# Patient Record
Sex: MALE | Race: WHITE | NOT HISPANIC OR LATINO | Employment: OTHER | URBAN - METROPOLITAN AREA
[De-identification: names, ages, dates, MRNs, and addresses within clinical notes are randomized per-mention and may not be internally consistent; named-entity substitution may affect disease eponyms.]

---

## 2018-10-27 ENCOUNTER — HOSPITAL ENCOUNTER (INPATIENT)
Facility: HOSPITAL | Age: 83
LOS: 4 days | Discharge: NURSING, REHAB OR LONG-TERM CARE | DRG: 470 | End: 2018-10-31
Attending: EMERGENCY MEDICINE | Admitting: INTERNAL MEDICINE
Payer: MEDICARE

## 2018-10-27 DIAGNOSIS — Z01.811 PRE-OP CHEST EXAM: ICD-10-CM

## 2018-10-27 DIAGNOSIS — S79.919A HIP INJURY: ICD-10-CM

## 2018-10-27 DIAGNOSIS — I49.9 IRREGULAR HEART BEAT: ICD-10-CM

## 2018-10-27 DIAGNOSIS — S72.002A LEFT DISPLACED FEMORAL NECK FRACTURE: Primary | ICD-10-CM

## 2018-10-27 PROCEDURE — 96375 TX/PRO/DX INJ NEW DRUG ADDON: CPT

## 2018-10-27 PROCEDURE — 96374 THER/PROPH/DIAG INJ IV PUSH: CPT

## 2018-10-27 PROCEDURE — 12000002 HC ACUTE/MED SURGE SEMI-PRIVATE ROOM

## 2018-10-27 PROCEDURE — 99285 EMERGENCY DEPT VISIT HI MDM: CPT | Mod: 25

## 2018-10-28 PROBLEM — S72.002A LEFT DISPLACED FEMORAL NECK FRACTURE: Status: ACTIVE | Noted: 2018-10-28

## 2018-10-28 PROBLEM — F03.90 DEMENTIA WITHOUT BEHAVIORAL DISTURBANCE: Status: ACTIVE | Noted: 2018-10-28

## 2018-10-28 PROBLEM — I10 ESSENTIAL HYPERTENSION: Status: ACTIVE | Noted: 2018-10-28

## 2018-10-28 PROBLEM — F02.818 ALZHEIMER'S DEMENTIA WITH BEHAVIORAL DISTURBANCE: Status: ACTIVE | Noted: 2018-10-28

## 2018-10-28 PROBLEM — F03.918 DEMENTIA WITH BEHAVIORAL DISTURBANCE: Status: ACTIVE | Noted: 2018-10-28

## 2018-10-28 PROBLEM — I50.22 CHRONIC SYSTOLIC HEART FAILURE: Status: ACTIVE | Noted: 2018-10-28

## 2018-10-28 PROBLEM — G30.9 ALZHEIMER'S DEMENTIA WITH BEHAVIORAL DISTURBANCE: Status: ACTIVE | Noted: 2018-10-28

## 2018-10-28 PROBLEM — I50.42 CHRONIC COMBINED SYSTOLIC AND DIASTOLIC HEART FAILURE: Status: ACTIVE | Noted: 2018-10-28

## 2018-10-28 LAB
ALBUMIN SERPL BCP-MCNC: 3.8 G/DL
ALP SERPL-CCNC: 81 U/L
ALT SERPL W/O P-5'-P-CCNC: 16 U/L
ANION GAP SERPL CALC-SCNC: 10 MMOL/L
ANION GAP SERPL CALC-SCNC: 12 MMOL/L
APTT BLDCRRT: 26.2 SEC
AST SERPL-CCNC: 25 U/L
BASOPHILS # BLD AUTO: 0 K/UL
BASOPHILS # BLD AUTO: ABNORMAL K/UL
BASOPHILS NFR BLD: 0 %
BASOPHILS NFR BLD: 1 %
BILIRUB SERPL-MCNC: 0.6 MG/DL
BILIRUB UR QL STRIP: NEGATIVE
BUN SERPL-MCNC: 18 MG/DL
BUN SERPL-MCNC: 20 MG/DL
CALCIUM SERPL-MCNC: 8.7 MG/DL
CALCIUM SERPL-MCNC: 9.4 MG/DL
CHLORIDE SERPL-SCNC: 104 MMOL/L
CHLORIDE SERPL-SCNC: 106 MMOL/L
CLARITY UR: CLEAR
CO2 SERPL-SCNC: 21 MMOL/L
CO2 SERPL-SCNC: 23 MMOL/L
COLOR UR: YELLOW
CREAT SERPL-MCNC: 0.8 MG/DL
CREAT SERPL-MCNC: 0.8 MG/DL
DIFFERENTIAL METHOD: ABNORMAL
DIFFERENTIAL METHOD: ABNORMAL
EOSINOPHIL # BLD AUTO: 0 K/UL
EOSINOPHIL # BLD AUTO: ABNORMAL K/UL
EOSINOPHIL NFR BLD: 0.3 %
EOSINOPHIL NFR BLD: 2 %
ERYTHROCYTE [DISTWIDTH] IN BLOOD BY AUTOMATED COUNT: 12.3 %
ERYTHROCYTE [DISTWIDTH] IN BLOOD BY AUTOMATED COUNT: 12.9 %
EST. GFR  (AFRICAN AMERICAN): >60 ML/MIN/1.73 M^2
EST. GFR  (AFRICAN AMERICAN): >60 ML/MIN/1.73 M^2
EST. GFR  (NON AFRICAN AMERICAN): >60 ML/MIN/1.73 M^2
EST. GFR  (NON AFRICAN AMERICAN): >60 ML/MIN/1.73 M^2
GLUCOSE SERPL-MCNC: 100 MG/DL
GLUCOSE SERPL-MCNC: 139 MG/DL
GLUCOSE UR QL STRIP: NEGATIVE
HCT VFR BLD AUTO: 40.1 %
HCT VFR BLD AUTO: 41.3 %
HGB BLD-MCNC: 13.8 G/DL
HGB BLD-MCNC: 13.9 G/DL
HGB UR QL STRIP: NEGATIVE
INR PPP: 1.1
KETONES UR QL STRIP: NEGATIVE
LEUKOCYTE ESTERASE UR QL STRIP: NEGATIVE
LYMPHOCYTES # BLD AUTO: 0.4 K/UL
LYMPHOCYTES # BLD AUTO: ABNORMAL K/UL
LYMPHOCYTES NFR BLD: 3.9 %
LYMPHOCYTES NFR BLD: 6 %
MCH RBC QN AUTO: 30.5 PG
MCH RBC QN AUTO: 31.4 PG
MCHC RBC AUTO-ENTMCNC: 33.3 G/DL
MCHC RBC AUTO-ENTMCNC: 34.6 G/DL
MCV RBC AUTO: 91 FL
MCV RBC AUTO: 92 FL
MONOCYTES # BLD AUTO: 0.7 K/UL
MONOCYTES # BLD AUTO: ABNORMAL K/UL
MONOCYTES NFR BLD: 5 %
MONOCYTES NFR BLD: 6.1 %
NEUTROPHILS # BLD AUTO: 10 K/UL
NEUTROPHILS NFR BLD: 84 %
NEUTROPHILS NFR BLD: 89.7 %
NEUTS BAND NFR BLD MANUAL: 2 %
NITRITE UR QL STRIP: NEGATIVE
PH UR STRIP: 7 [PH] (ref 5–8)
PLATELET # BLD AUTO: 206 K/UL
PLATELET # BLD AUTO: 214 K/UL
PLATELET BLD QL SMEAR: ABNORMAL
PMV BLD AUTO: 7.5 FL
PMV BLD AUTO: 7.5 FL
POTASSIUM SERPL-SCNC: 3.2 MMOL/L
POTASSIUM SERPL-SCNC: 3.8 MMOL/L
PROT SERPL-MCNC: 7.8 G/DL
PROT UR QL STRIP: NEGATIVE
PROTHROMBIN TIME: 10.8 SEC
RBC # BLD AUTO: 4.43 M/UL
RBC # BLD AUTO: 4.51 M/UL
SODIUM SERPL-SCNC: 137 MMOL/L
SODIUM SERPL-SCNC: 139 MMOL/L
SP GR UR STRIP: 1.02 (ref 1–1.03)
URN SPEC COLLECT METH UR: NORMAL
UROBILINOGEN UR STRIP-ACNC: NEGATIVE EU/DL
WBC # BLD AUTO: 11.2 K/UL
WBC # BLD AUTO: 9.7 K/UL

## 2018-10-28 PROCEDURE — 85007 BL SMEAR W/DIFF WBC COUNT: CPT

## 2018-10-28 PROCEDURE — 63600175 PHARM REV CODE 636 W HCPCS: Performed by: PHYSICIAN ASSISTANT

## 2018-10-28 PROCEDURE — 25000003 PHARM REV CODE 250: Performed by: NURSE PRACTITIONER

## 2018-10-28 PROCEDURE — 85730 THROMBOPLASTIN TIME PARTIAL: CPT

## 2018-10-28 PROCEDURE — 25000003 PHARM REV CODE 250: Performed by: INTERNAL MEDICINE

## 2018-10-28 PROCEDURE — 85027 COMPLETE CBC AUTOMATED: CPT

## 2018-10-28 PROCEDURE — 94761 N-INVAS EAR/PLS OXIMETRY MLT: CPT

## 2018-10-28 PROCEDURE — 63600175 PHARM REV CODE 636 W HCPCS: Performed by: NURSE PRACTITIONER

## 2018-10-28 PROCEDURE — 81003 URINALYSIS AUTO W/O SCOPE: CPT

## 2018-10-28 PROCEDURE — 63600175 PHARM REV CODE 636 W HCPCS: Performed by: EMERGENCY MEDICINE

## 2018-10-28 PROCEDURE — 36415 COLL VENOUS BLD VENIPUNCTURE: CPT

## 2018-10-28 PROCEDURE — 93005 ELECTROCARDIOGRAM TRACING: CPT

## 2018-10-28 PROCEDURE — 85025 COMPLETE CBC W/AUTO DIFF WBC: CPT

## 2018-10-28 PROCEDURE — 93010 ELECTROCARDIOGRAM REPORT: CPT | Mod: ,,, | Performed by: INTERNAL MEDICINE

## 2018-10-28 PROCEDURE — 80048 BASIC METABOLIC PNL TOTAL CA: CPT

## 2018-10-28 PROCEDURE — 86580 TB INTRADERMAL TEST: CPT | Performed by: PHYSICIAN ASSISTANT

## 2018-10-28 PROCEDURE — 99900035 HC TECH TIME PER 15 MIN (STAT)

## 2018-10-28 PROCEDURE — S0166 INJ OLANZAPINE 2.5MG: HCPCS | Performed by: NURSE PRACTITIONER

## 2018-10-28 PROCEDURE — 80053 COMPREHEN METABOLIC PANEL: CPT

## 2018-10-28 PROCEDURE — 85610 PROTHROMBIN TIME: CPT

## 2018-10-28 PROCEDURE — 11000001 HC ACUTE MED/SURG PRIVATE ROOM

## 2018-10-28 RX ORDER — MORPHINE SULFATE 2 MG/ML
2 INJECTION, SOLUTION INTRAMUSCULAR; INTRAVENOUS EVERY 4 HOURS PRN
Status: DISCONTINUED | OUTPATIENT
Start: 2018-10-28 | End: 2018-10-28

## 2018-10-28 RX ORDER — QUETIAPINE FUMARATE 25 MG/1
25 TABLET, FILM COATED ORAL 2 TIMES DAILY
COMMUNITY

## 2018-10-28 RX ORDER — MEMANTINE HYDROCHLORIDE 5 MG/1
10 TABLET ORAL 2 TIMES DAILY
Status: DISCONTINUED | OUTPATIENT
Start: 2018-10-28 | End: 2018-10-31 | Stop reason: HOSPADM

## 2018-10-28 RX ORDER — POTASSIUM CHLORIDE 20 MEQ/1
40 TABLET, EXTENDED RELEASE ORAL ONCE
Status: COMPLETED | OUTPATIENT
Start: 2018-10-28 | End: 2018-10-28

## 2018-10-28 RX ORDER — POLYETHYLENE GLYCOL 3350 17 G/17G
17 POWDER, FOR SOLUTION ORAL DAILY
Status: DISCONTINUED | OUTPATIENT
Start: 2018-10-28 | End: 2018-10-29

## 2018-10-28 RX ORDER — QUETIAPINE FUMARATE 25 MG/1
25 TABLET, FILM COATED ORAL 2 TIMES DAILY
Status: DISCONTINUED | OUTPATIENT
Start: 2018-10-28 | End: 2018-10-31 | Stop reason: HOSPADM

## 2018-10-28 RX ORDER — ALBUTEROL SULFATE 2.5 MG/.5ML
2.5 SOLUTION RESPIRATORY (INHALATION) EVERY 4 HOURS PRN
Status: DISCONTINUED | OUTPATIENT
Start: 2018-10-28 | End: 2018-10-29

## 2018-10-28 RX ORDER — VALSARTAN 160 MG/1
160 TABLET ORAL DAILY
COMMUNITY

## 2018-10-28 RX ORDER — HYDROMORPHONE HYDROCHLORIDE 2 MG/ML
1 INJECTION, SOLUTION INTRAMUSCULAR; INTRAVENOUS; SUBCUTANEOUS EVERY 6 HOURS PRN
Status: DISCONTINUED | OUTPATIENT
Start: 2018-10-28 | End: 2018-10-29

## 2018-10-28 RX ORDER — AMLODIPINE BESYLATE 5 MG/1
5 TABLET ORAL 2 TIMES DAILY
Status: DISCONTINUED | OUTPATIENT
Start: 2018-10-28 | End: 2018-10-31 | Stop reason: HOSPADM

## 2018-10-28 RX ORDER — RAMELTEON 8 MG/1
8 TABLET ORAL NIGHTLY PRN
Status: DISCONTINUED | OUTPATIENT
Start: 2018-10-28 | End: 2018-10-29

## 2018-10-28 RX ORDER — OLANZAPINE 10 MG/2ML
2.5 INJECTION, POWDER, FOR SOLUTION INTRAMUSCULAR ONCE AS NEEDED
Status: COMPLETED | OUTPATIENT
Start: 2018-10-28 | End: 2018-10-28

## 2018-10-28 RX ORDER — DONEPEZIL HYDROCHLORIDE 5 MG/1
5 TABLET, FILM COATED ORAL NIGHTLY
COMMUNITY

## 2018-10-28 RX ORDER — CARVEDILOL 6.25 MG/1
6.25 TABLET ORAL 2 TIMES DAILY WITH MEALS
Status: DISCONTINUED | OUTPATIENT
Start: 2018-10-28 | End: 2018-10-31 | Stop reason: HOSPADM

## 2018-10-28 RX ORDER — AMLODIPINE BESYLATE 5 MG/1
5 TABLET ORAL 2 TIMES DAILY
COMMUNITY

## 2018-10-28 RX ORDER — AMOXICILLIN 250 MG
1 CAPSULE ORAL 2 TIMES DAILY PRN
Status: DISCONTINUED | OUTPATIENT
Start: 2018-10-28 | End: 2018-10-29

## 2018-10-28 RX ORDER — ACETAMINOPHEN 325 MG/1
650 TABLET ORAL EVERY 4 HOURS PRN
Status: DISCONTINUED | OUTPATIENT
Start: 2018-10-28 | End: 2018-10-29

## 2018-10-28 RX ORDER — SODIUM CHLORIDE 0.9 % (FLUSH) 0.9 %
5 SYRINGE (ML) INJECTION
Status: DISCONTINUED | OUTPATIENT
Start: 2018-10-28 | End: 2018-10-29

## 2018-10-28 RX ORDER — VALSARTAN 80 MG/1
160 TABLET ORAL DAILY
Status: DISCONTINUED | OUTPATIENT
Start: 2018-10-28 | End: 2018-10-31 | Stop reason: HOSPADM

## 2018-10-28 RX ORDER — ONDANSETRON 2 MG/ML
4 INJECTION INTRAMUSCULAR; INTRAVENOUS EVERY 4 HOURS PRN
Status: DISCONTINUED | OUTPATIENT
Start: 2018-10-28 | End: 2018-10-29

## 2018-10-28 RX ORDER — ERGOCALCIFEROL 1.25 MG/1
50000 CAPSULE ORAL
COMMUNITY

## 2018-10-28 RX ORDER — MORPHINE SULFATE 4 MG/ML
4 INJECTION, SOLUTION INTRAMUSCULAR; INTRAVENOUS
Status: COMPLETED | OUTPATIENT
Start: 2018-10-28 | End: 2018-10-28

## 2018-10-28 RX ORDER — DONEPEZIL HYDROCHLORIDE 5 MG/1
5 TABLET, FILM COATED ORAL NIGHTLY
Status: DISCONTINUED | OUTPATIENT
Start: 2018-10-28 | End: 2018-10-31 | Stop reason: HOSPADM

## 2018-10-28 RX ADMIN — CARVEDILOL 6.25 MG: 6.25 TABLET, FILM COATED ORAL at 04:10

## 2018-10-28 RX ADMIN — OLANZAPINE 2.5 MG: 10 INJECTION, POWDER, FOR SOLUTION INTRAMUSCULAR at 05:10

## 2018-10-28 RX ADMIN — MORPHINE SULFATE 4 MG: 4 INJECTION INTRAVENOUS at 12:10

## 2018-10-28 RX ADMIN — AMLODIPINE BESYLATE 5 MG: 5 TABLET ORAL at 08:10

## 2018-10-28 RX ADMIN — QUETIAPINE FUMARATE 25 MG: 25 TABLET ORAL at 08:10

## 2018-10-28 RX ADMIN — VALSARTAN 160 MG: 80 TABLET, FILM COATED ORAL at 08:10

## 2018-10-28 RX ADMIN — HYDROMORPHONE HYDROCHLORIDE 1 MG: 2 INJECTION, SOLUTION INTRAMUSCULAR; INTRAVENOUS; SUBCUTANEOUS at 10:10

## 2018-10-28 RX ADMIN — DONEPEZIL HYDROCHLORIDE 5 MG: 5 TABLET, FILM COATED ORAL at 08:10

## 2018-10-28 RX ADMIN — TUBERCULIN PURIFIED PROTEIN DERIVATIVE 5 UNITS: 5 INJECTION, SOLUTION INTRADERMAL at 12:10

## 2018-10-28 RX ADMIN — CARVEDILOL 6.25 MG: 6.25 TABLET, FILM COATED ORAL at 08:10

## 2018-10-28 RX ADMIN — Medication 1 MG: at 01:10

## 2018-10-28 RX ADMIN — HYDROMORPHONE HYDROCHLORIDE 1 MG: 2 INJECTION, SOLUTION INTRAMUSCULAR; INTRAVENOUS; SUBCUTANEOUS at 07:10

## 2018-10-28 RX ADMIN — MEMANTINE HYDROCHLORIDE 10 MG: 5 TABLET ORAL at 08:10

## 2018-10-28 RX ADMIN — POLYETHYLENE GLYCOL 3350 17 G: 17 POWDER, FOR SOLUTION ORAL at 08:10

## 2018-10-28 RX ADMIN — POTASSIUM CHLORIDE 40 MEQ: 1500 TABLET, EXTENDED RELEASE ORAL at 10:10

## 2018-10-28 RX ADMIN — HYDROMORPHONE HYDROCHLORIDE 1 MG: 2 INJECTION, SOLUTION INTRAMUSCULAR; INTRAVENOUS; SUBCUTANEOUS at 01:10

## 2018-10-28 NOTE — ED PROVIDER NOTES
Encounter Date: 10/27/2018    SCRIBE #1 NOTE: Narendra ARRIOLA, jalen scribing for, and in the presence of, Dr. Shah .       History     Chief Complaint   Patient presents with    Hip Pain     fall approx 1 hour ago.  Lt hip and leg pain       Time seen by provider: 12:07 AM on 10/28/2018    Adrian Osorio is a 85 y.o. male with a hx of HTN, CAD, and CHF who presents to the ED with c/o left hip pain s/p fall 1.5 hours ago. Pt fell onto a concrete floor. He denies head injury.  His pain is worse with palpation and movement of the hip.  There are no alleviating factors.  Pt fx the right arm due to a fall a year ago per relative. NKDA noted.       The history is provided by the patient and a relative.     Review of patient's allergies indicates:  No Known Allergies  Past Medical History:   Diagnosis Date    CHF (congestive heart failure)     Coronary artery disease     Hypertension      Past Surgical History:   Procedure Laterality Date    CARDIAC SURGERY      cabg     History reviewed. No pertinent family history.  Social History     Tobacco Use    Smoking status: Never Smoker    Smokeless tobacco: Never Used   Substance Use Topics    Alcohol use: No    Drug use: Not on file     Review of Systems   Constitutional: Negative for fever.   HENT: Negative for sore throat.    Eyes: Negative for redness.   Respiratory: Negative for shortness of breath.    Cardiovascular: Negative for chest pain.   Gastrointestinal: Negative for nausea.   Genitourinary: Negative for dysuria.   Musculoskeletal: Positive for arthralgias (Lt hip ). Negative for back pain.   Skin: Negative for rash.   Neurological: Negative for weakness.   Hematological: Does not bruise/bleed easily.       Physical Exam     Initial Vitals [10/27/18 2340]   BP Pulse Resp Temp SpO2   129/71 81 20 98.3 °F (36.8 °C) (!) 88 %      MAP       --         Physical Exam    Nursing note and vitals reviewed.  Constitutional: He appears well-developed and  well-nourished. He is not diaphoretic. No distress.   HENT:   Head: Normocephalic and atraumatic.   Eyes: EOM are normal. Pupils are equal, round, and reactive to light.   Neck: Normal range of motion. Neck supple.   Cardiovascular: Normal rate, regular rhythm, normal heart sounds and intact distal pulses. Exam reveals no gallop and no friction rub.    No murmur heard.  Pulmonary/Chest: Breath sounds normal. No respiratory distress. He has no wheezes. He has no rhonchi. He has no rales.   Abdominal: Soft. Bowel sounds are normal. There is no tenderness. There is no rebound and no guarding.   Musculoskeletal: Normal range of motion. He exhibits tenderness.   Tenderness to left hip with palpation and ROM.    Neurological: He is alert and oriented to person, place, and time.   Skin: Skin is warm.   Psychiatric: He has a normal mood and affect. His behavior is normal. Judgment and thought content normal.         ED Course   Procedures  Labs Reviewed   COMPREHENSIVE METABOLIC PANEL   CBC W/ AUTO DIFFERENTIAL     EKG Readings: (Independently Interpreted)   Heart Rate: 79 bpm. ST Segments: Normal ST Segments. Axis: Left Axis Deviation. Clinical Impression: AV Block - 1st Degree       Imaging Results          X-Ray Chest 1 View (In process)                X-Ray Hip 2 View Left (In process)                  Medical Decision Making:   History:   Old Medical Records: I decided to obtain old medical records.  Initial Assessment:   85-year-old male presented with a chief complaint of hip pain status post fall.  Differential Diagnosis:   My differential diagnosis includes fx, dislocation, and contusion.     Clinical Tests:   Lab Tests: Ordered  Radiological Study: Ordered and Reviewed  Medical Tests: Ordered and Reviewed  ED Management:  The patient was emergently evaluated in the emergency department, his evaluation was significant for an elderly male with tenderness noted to the left hip.  The patient's x-ray shows a left  femoral neck fracture per my independent interpretation.  The patient will require admission orthopedic treatment of his hip fracture.  I will admit the patient to the hospitalist service for further care.  I have discussed the case with the APC on call for the hospitalist service.  She has accepted the patient for admission.  I have also ordered preoperative labs, preoperative chest x-ray, and a preoperative EKG as well.  His pain was treated with parental morphine in the emergency department.  Other:   I have discussed this case with another health care provider.            Scribe Attestation:   Scribe #1: I performed the above scribed service and the documentation accurately describes the services I performed. I attest to the accuracy of the note.        I, Dr. Jin Shah, personally performed the services described in this documentation. All medical record entries made by the scribe were at my direction and in my presence.  I have reviewed the chart and agree that the record reflects my personal performance and is accurate and complete. Jin Shah MD.  2:32 AM 10/28/2018          Clinical Impression:     1. Left displaced femoral neck fracture    2. Hip injury    3. Pre-op chest exam          Disposition:   Disposition: Admitted                        Jin Shah MD  10/28/18 0232

## 2018-10-28 NOTE — CONSULTS
[facility]  Cardiology  Consult Note    Patient Name: Adrian Osorio  MRN: [id number]  Admission Date: 10/27/2018  Hospital Length of Stay: 0 days  Code Status: Full Code   Attending Provider: Petra Mccollum MD   Consulting Provider: Neftali Hurley MD  Primary Care Physician: Martín Perales MD  Principal Problem:Left displaced femoral neck fracture    Patient information was obtained from relative(s) and ER records.     Consults  Subjective:     Chief Complaint:  Fracture left hip    HPI:Mr. Osorio is an 84yo M with a PMH of Dementia, HTN, CAD/CABG 2008, CHF, ICMO with EF 35%. He presents to the ED with c/o left hip pain. It started after having a fall while in his room. While in the ED, he had a left hip Xray which showed a femoral neck fracture. His family is at bedside. He is currently complaining left hip pain and sates that the Morphine did not help.  has dilated cmp ef 35-40% ,NO mi, chf OR usa, AT THIS APPEAR STABLE AND IS Low risk for surgical procedure.        Past Medical History:   Diagnosis Date    CHF (congestive heart failure)     Coronary artery disease     Hypertension        Past Surgical History:   Procedure Laterality Date    CARDIAC SURGERY      cabg       Review of patient's allergies indicates:  No Known Allergies    No current facility-administered medications on file prior to encounter.      Current Outpatient Medications on File Prior to Encounter   Medication Sig    amLODIPine (NORVASC) 5 MG tablet Take 5 mg by mouth 2 (two) times daily.    aspirin 81 MG chewable tablet Take 81 mg by mouth once daily.      carvedilol (COREG) 6.25 MG tablet Take 6.25 mg by mouth 2 (two) times daily with meals.      donepezil (ARICEPT) 5 MG tablet Take 5 mg by mouth every evening.    memantine (NAMENDA) 10 MG tablet Take 10 mg by mouth 2 (two) times daily.      potassium chloride SA (K-DUR,KLOR-CON) 20 MEQ tablet Take 20 mEq by mouth once daily.     QUEtiapine  (SEROQUEL) 25 MG Tab Take 25 mg by mouth 2 (two) times daily. Take 1-2 tablets by mouth 2 times daily    valsartan (DIOVAN) 160 MG tablet Take 160 mg by mouth once daily.    atorvastatin (LIPITOR) 80 MG tablet Take 80 mg by mouth once daily.      ergocalciferol (ERGOCALCIFEROL) 50,000 unit Cap Take 50,000 Units by mouth every 14 (fourteen) days.    furosemide (LASIX) 40 MG tablet Take 40 mg by mouth once daily.      losartan (COZAAR) 100 MG tablet Take 100 mg by mouth once daily.       Family History     Problem Relation (Age of Onset)    Heart attack Sister    Heart disease Mother, Father    Hypertension Brother    Stroke Brother        Tobacco Use    Smoking status: Never Smoker    Smokeless tobacco: Never Used   Substance and Sexual Activity    Alcohol use: No    Drug use: Not on file    Sexual activity: Not Currently     Review of Systems   Unable to perform ROS: dementia     Objective:     Vital Signs (Most Recent):  Temp: 96.9 °F (36.1 °C) (10/28/18 1604)  Pulse: 79 (10/28/18 1604)  Resp: 18 (10/28/18 1604)  BP: 132/67 (10/28/18 1604)  SpO2: (!) 81 % (10/28/18 1604) Vital Signs (24h Range):  Temp:  [96.9 °F (36.1 °C)-99 °F (37.2 °C)] 96.9 °F (36.1 °C)  Pulse:  [69-86] 79  Resp:  [16-20] 18  SpO2:  [81 %-95 %] 81 %  BP: (129-159)/(67-74) 132/67     Weight: 89 kg (196 lb 4.8 oz)  Body mass index is 27.38 kg/m².    SpO2: (!) 81 %  O2 Device (Oxygen Therapy): nasal cannula      Intake/Output Summary (Last 24 hours) at 10/28/2018 1841  Last data filed at 10/28/2018 0600  Gross per 24 hour   Intake 0 ml   Output 600 ml   Net -600 ml       Lines/Drains/Airways     Drain                 Urethral Catheter 10/28/18 0430 Latex less than 1 day          Peripheral Intravenous Line                 Peripheral IV - Single Lumen 10/28/18 0109 Right Forearm less than 1 day                Physical Exam   Constitutional: He appears well-developed.   HENT:   Head: Normocephalic.   Eyes: Pupils are equal, round, and  reactive to light.   Neck: Normal range of motion.   Cardiovascular: Normal rate.   Pulmonary/Chest: Effort normal.   Abdominal: Soft.   Neurological: He is alert.       Significant Labs:   ABG: No results for input(s): PH, PCO2, HCO3, POCSATURATED, BE in the last 48 hours., CMP   Recent Labs   Lab 10/28/18  0111 10/28/18  0434    137   K 3.8 3.2*    104   CO2 21* 23    139*   BUN 20 18   CREATININE 0.8 0.8   CALCIUM 9.4 8.7   PROT 7.8  --    ALBUMIN 3.8  --    BILITOT 0.6  --    ALKPHOS 81  --    AST 25  --    ALT 16  --    ANIONGAP 12 10   ESTGFRAFRICA >60 >60   EGFRNONAA >60 >60   , CBC   Recent Labs   Lab 10/28/18  0111 10/28/18  0434   WBC 9.70 11.20   HGB 13.9* 13.8*   HCT 40.1 41.3    206   , INR   Recent Labs   Lab 10/28/18  0434   INR 1.1   , Lipid Panel No results for input(s): CHOL, HDL, LDLCALC, TRIG, CHOLHDL in the last 48 hours. and Troponin No results for input(s): TROPONINI in the last 48 hours.    Significant Imaging: X-Ray: CXR: X-Ray Chest 1 View (CXR):   Results for orders placed or performed during the hospital encounter of 10/27/18   X-Ray Chest 1 View    Narrative    EXAMINATION:  XR CHEST 1 VIEW    TECHNIQUE:  A single portable semi-upright AP chest radiograph was acquired.    COMPARISON:  None    FINDINGS:  There are postoperative changes of prior median sternotomy and CABG.  The cardiac silhouette is mildly enlarged.  There is a large air containing retrocardiac structure, most likely a large hiatal hernia.  No pulmonary vascular congestion appreciated.  There is linear atelectasis and/or fibrosis present at the left lung base resulting in elevation of the left hemidiaphragm.  No definite pulmonary mass.  There is mild blunting of the left costophrenic angle.  Trace left basilar pleural fluid is not excluded.  No pneumothorax.  The bones reveal degenerative change of the cervical and thoracic spine.      Impression    As above      Electronically signed by: Nabil  MD Kike  Date:    10/28/2018  Time:    06:48     Assessment and Plan:     Active Diagnoses:    Diagnosis Date Noted POA    PRINCIPAL PROBLEM:  Left displaced femoral neck fracture [S72.002A] 10/28/2018 Yes    Essential hypertension [I10] 10/28/2018 Yes    Chronic systolic heart failure/ ICMO EF 40% [I50.22] 10/28/2018 Yes    Alzheimer's dementia with behavioral disturbance [G30.9, F02.81] 10/28/2018 Yes      Problems Resolved During this Admission:       VTE Risk Mitigation (From admission, onward)        Ordered     Place sequential compression device  Until discontinued      10/28/18 0204     IP VTE HIGH RISK PATIENT  Once      10/28/18 0204     Place sequential compression device  Until discontinued      10/28/18 0204          Thank you for your consult. I will follow-up with patient. Please contact us if you have any additional questions.    Neftali Hurley MD  Cardiology   [facility]

## 2018-10-28 NOTE — SUBJECTIVE & OBJECTIVE
Past Medical History:   Diagnosis Date    CHF (congestive heart failure)     Coronary artery disease     Hypertension        Past Surgical History:   Procedure Laterality Date    CARDIAC SURGERY      cabg       Review of patient's allergies indicates:  No Known Allergies    Current Facility-Administered Medications   Medication    acetaminophen tablet 650 mg    albuterol sulfate nebulizer solution 2.5 mg    amLODIPine tablet 5 mg    carvedilol tablet 6.25 mg    donepezil tablet 5 mg    hydromorphone (PF) injection 1 mg    influenza (FLUZONE HIGH-DOSE) vaccine 0.5 mL    memantine tablet 10 mg    ondansetron injection 4 mg    pneumoc 13-lolly conj-dip cr(PF) 0.5 mL    polyethylene glycol packet 17 g    QUEtiapine tablet 25 mg    ramelteon tablet 8 mg    senna-docusate 8.6-50 mg per tablet 1 tablet    sodium chloride 0.9% flush 5 mL    valsartan tablet 160 mg     Family History     Problem Relation (Age of Onset)    Heart attack Sister    Heart disease Mother, Father    Hypertension Brother    Stroke Brother        Tobacco Use    Smoking status: Never Smoker    Smokeless tobacco: Never Used   Substance and Sexual Activity    Alcohol use: No    Drug use: Not on file    Sexual activity: Not Currently     Review of Systems   Constitution: Negative for chills, decreased appetite, diaphoresis, fever and weakness.   HENT: Negative for congestion.    Eyes: Negative for blurred vision and discharge.   Cardiovascular: Negative for chest pain.   Respiratory: Negative for cough and hemoptysis.    Endocrine: Negative for cold intolerance and heat intolerance.   Gastrointestinal: Negative for abdominal pain and anorexia.   Neurological: Negative for brief paralysis and headaches.   Psychiatric/Behavioral: Positive for altered mental status and memory loss.        Dementia     Objective:     Vital Signs (Most Recent):  Temp: 98.2 °F (36.8 °C) (10/28/18 0810)  Pulse: 86 (10/28/18 0810)  Resp: 18 (10/28/18  "0810)  BP: (!) 159/74 (10/28/18 0810)  SpO2: (!) 93 % (10/28/18 0214) Vital Signs (24h Range):  Temp:  [98.2 °F (36.8 °C)-99 °F (37.2 °C)] 98.2 °F (36.8 °C)  Pulse:  [69-86] 86  Resp:  [16-20] 18  SpO2:  [88 %-95 %] 93 %  BP: (129-159)/(70-74) 159/74     Weight: 89 kg (196 lb 4.8 oz)  Height: 5' 11" (180.3 cm)  Body mass index is 27.38 kg/m².      Intake/Output Summary (Last 24 hours) at 10/28/2018 1044  Last data filed at 10/28/2018 0600  Gross per 24 hour   Intake 0 ml   Output 600 ml   Net -600 ml                   Right Hip Exam     Comments:  R hip with 2 small open wounds due to vascular disease. Currently being treated by Podiatry.  Left Hip Exam     Comments:  LLE DNVI. L foot with bluish tint, weak DP pulse, and delayed cap refill. Hip is ER. No open wounds LLE.            Significant Labs:   CBC:   Recent Labs   Lab 10/28/18  0111 10/28/18  0434   WBC 9.70 11.20   HGB 13.9* 13.8*   HCT 40.1 41.3    206     CMP:   Recent Labs   Lab 10/28/18  0111 10/28/18  0434    137   K 3.8 3.2*    104   CO2 21* 23    139*   BUN 20 18   CREATININE 0.8 0.8   CALCIUM 9.4 8.7   PROT 7.8  --    ALBUMIN 3.8  --    BILITOT 0.6  --    ALKPHOS 81  --    AST 25  --    ALT 16  --    ANIONGAP 12 10   EGFRNONAA >60 >60     All pertinent labs within the past 24 hours have been reviewed.    Significant Imaging:  X-Ray: I have reviewed all pertinent results/findings and my personal findings are:  Acute, displaced subcapital left femoral neck fracture.  "

## 2018-10-28 NOTE — ASSESSMENT & PLAN NOTE
Stable.  Continue BB, diuretic, and ARB  Monitor on telemetry  Weigh daily  Last Echo 10/24/18: The estimated LV ejection fraction is 40 %. LV systolic function is mildly to moderately reduced. Diastolic function is indeterminate.

## 2018-10-28 NOTE — SUBJECTIVE & OBJECTIVE
Past Medical History:   Diagnosis Date    CHF (congestive heart failure)     Coronary artery disease     Hypertension        Past Surgical History:   Procedure Laterality Date    CARDIAC SURGERY      cabg       Review of patient's allergies indicates:  No Known Allergies    No current facility-administered medications on file prior to encounter.      Current Outpatient Medications on File Prior to Encounter   Medication Sig    amLODIPine (NORVASC) 5 MG tablet Take 5 mg by mouth 2 (two) times daily.    aspirin 81 MG chewable tablet Take 81 mg by mouth once daily.      carvedilol (COREG) 6.25 MG tablet Take 6.25 mg by mouth 2 (two) times daily with meals.      donepezil (ARICEPT) 5 MG tablet Take 5 mg by mouth every evening.    memantine (NAMENDA) 10 MG tablet Take 10 mg by mouth 2 (two) times daily.      potassium chloride SA (K-DUR,KLOR-CON) 20 MEQ tablet Take 20 mEq by mouth once daily.     QUEtiapine (SEROQUEL) 25 MG Tab Take 25 mg by mouth 2 (two) times daily. Take 1-2 tablets by mouth 2 times daily    valsartan (DIOVAN) 160 MG tablet Take 160 mg by mouth once daily.    atorvastatin (LIPITOR) 80 MG tablet Take 80 mg by mouth once daily.      ergocalciferol (ERGOCALCIFEROL) 50,000 unit Cap Take 50,000 Units by mouth every 14 (fourteen) days.    furosemide (LASIX) 40 MG tablet Take 40 mg by mouth once daily.      losartan (COZAAR) 100 MG tablet Take 100 mg by mouth once daily.       Family History     None        Tobacco Use    Smoking status: Never Smoker    Smokeless tobacco: Never Used   Substance and Sexual Activity    Alcohol use: No    Drug use: Not on file    Sexual activity: Not Currently     Review of Systems   Constitutional: Negative for chills and fever.   HENT: Negative for congestion.    Eyes: Negative for visual disturbance.   Respiratory: Negative for cough and shortness of breath.    Cardiovascular: Negative for chest pain and palpitations.   Gastrointestinal: Negative for  abdominal pain, diarrhea, nausea and vomiting.   Genitourinary: Negative for dysuria.   Musculoskeletal: Positive for arthralgias and gait problem.   Skin: Negative for wound.   Neurological: Negative for dizziness, syncope and headaches.   Hematological: Does not bruise/bleed easily.   Psychiatric/Behavioral: Negative for confusion and hallucinations.     Objective:     Vital Signs (Most Recent):  Temp: 99 °F (37.2 °C) (10/28/18 0214)  Pulse: 84 (10/28/18 0214)  Resp: 16 (10/28/18 0214)  BP: 136/74 (10/28/18 0002)  SpO2: (!) 93 % (10/28/18 0214) Vital Signs (24h Range):  Temp:  [98.3 °F (36.8 °C)-99 °F (37.2 °C)] 99 °F (37.2 °C)  Pulse:  [69-84] 84  Resp:  [16-20] 16  SpO2:  [88 %-95 %] 93 %  BP: (129-150)/(71-74) 136/74     Weight: 99.8 kg (220 lb)  Body mass index is 30.68 kg/m².    Physical Exam   Constitutional: He is oriented to person, place, and time. No distress.   HENT:   Head: Normocephalic.   [place]   Eyes: Pupils are equal, round, and reactive to light.   Neck: Normal range of motion. Neck supple. No JVD present. No tracheal deviation present.   Cardiovascular: Normal rate, regular rhythm, normal heart sounds and intact distal pulses.   No murmur heard.  Pulmonary/Chest: Effort normal and breath sounds normal. No respiratory distress.   Abdominal: Soft. Bowel sounds are normal. He exhibits no distension. There is no tenderness.   Musculoskeletal: He exhibits tenderness. He exhibits no edema.   LROM LLE due to pain   Neurological: He is alert and oriented to person, place, and time. No cranial nerve deficit.   Skin: Skin is warm, dry and intact. Capillary refill takes less than 2 seconds.   Psychiatric: He has a normal mood and affect. His behavior is normal. Judgment and thought content normal.         CRANIAL NERVES     CN III, IV, VI   Pupils are equal, round, and reactive to light.       Significant Labs:   CBC:   Recent Labs   Lab 10/28/18  0111   WBC 9.70   HGB 13.9*   HCT 40.1        CMP:    Recent Labs   Lab 10/28/18  0111      K 3.8      CO2 21*      BUN 20   CREATININE 0.8   CALCIUM 9.4   PROT 7.8   ALBUMIN 3.8   BILITOT 0.6   ALKPHOS 81   AST 25   ALT 16   ANIONGAP 12   EGFRNONAA >60       EKG: NSR with 1st AVB    Significant Imaging:     CXR: Reviewed film, looks ok.    Left Hip Xray: Radiologist's report not downloaded.

## 2018-10-28 NOTE — PLAN OF CARE
SSC met with patient and his daughter Ann Marie dean at the bedside. Ann Marie provided information for assessment, as patient is hard of hearing and has PMH of dementia. Ann Marie denies any inpatient admissions less than 30 days.  Verified PCP as:  Dr. Martín Chowdary.  Preferred pharmacy: [facility] [place];  Denies dialysis care; Denies Coumadin for home use; Denies active home health services; has had [facility] in the past.  Denies DME for home use. Patient is scheduled for a L hip ORIF on tomorrow ( Monday). Daughter jacqui whitman and [facility]Adrian ([facility]) is requesting patient be sent to [facility] bed unit post discharge; signed patient choice form and placed in blue chart. Dc plan is to be determined; pending PT/OT [facility].    10/28/18 1116   Discharge Assessment   Assessment Type Discharge Planning Assessment   Confirmed/corrected address and phone number on face sheet? Yes   Assessment information obtained from? Family member    Communicated expected length of stay with patient/caregiver Yes, 3-5 days    Type of Healthcare Directive Received Medical Power of     If Healthcare Directive is received, is it scanned into Epic? no (comment)   Prior to hospitalization cognitive status: Alert   Prior to hospitalization functional status: Minimum Dependent    Current cognitive status: Alert   Current Functional Status: Minimum  Dependent    Arrived From home or self-care   Lives With Daughter    Able to Return to Prior Arrangements No    Is patient able to care for self after discharge? No   How many people do you have in your home that can help with your care after discharge? 1   Who are your caregiver(s) and their phone number(s)? Madeleine Boudreaux [phone]   Provided patient/caregiver education on the expected discharge date and the discharge plan Yes   Patient currently being followed by outpatient case management? No   Patient currently receives home health services? No   Does the patient currently  use HME? No   Patient currently receives private duty nursing? No   Patient currently receives any other outside agency services? No   Do you have any problems affording any of your prescribed medications? No    Is the patient taking medications as prescribed? Yes    Do you have any financial concerns preventing you from receiving the healthcare you need? No   Does the patient have transportation to healthcare appointments? Yes   Transportation Available family or friend will provide   Discharge Plan A Skilled Nursing   Discharge Plan B Home health    Patient/Family In Agreement With Plan Yes

## 2018-10-28 NOTE — SUBJECTIVE & OBJECTIVE
Past Medical History:   Diagnosis Date    CHF (congestive heart failure)     Coronary artery disease     Hypertension        Past Surgical History:   Procedure Laterality Date    CARDIAC SURGERY      cabg       Review of patient's allergies indicates:  No Known Allergies    No current facility-administered medications on file prior to encounter.      Current Outpatient Medications on File Prior to Encounter   Medication Sig    amLODIPine (NORVASC) 5 MG tablet Take 5 mg by mouth 2 (two) times daily.    aspirin 81 MG chewable tablet Take 81 mg by mouth once daily.      carvedilol (COREG) 6.25 MG tablet Take 6.25 mg by mouth 2 (two) times daily with meals.      donepezil (ARICEPT) 5 MG tablet Take 5 mg by mouth every evening.    memantine (NAMENDA) 10 MG tablet Take 10 mg by mouth 2 (two) times daily.      potassium chloride SA (K-DUR,KLOR-CON) 20 MEQ tablet Take 20 mEq by mouth once daily.     QUEtiapine (SEROQUEL) 25 MG Tab Take 25 mg by mouth 2 (two) times daily. Take 1-2 tablets by mouth 2 times daily    valsartan (DIOVAN) 160 MG tablet Take 160 mg by mouth once daily.    atorvastatin (LIPITOR) 80 MG tablet Take 80 mg by mouth once daily.      ergocalciferol (ERGOCALCIFEROL) 50,000 unit Cap Take 50,000 Units by mouth every 14 (fourteen) days.    furosemide (LASIX) 40 MG tablet Take 40 mg by mouth once daily.      losartan (COZAAR) 100 MG tablet Take 100 mg by mouth once daily.       Family History     Mother: -- CAD  Father: -- CAD  Sister: MI  Brother: HTN, CVA        Tobacco Use    Smoking status: Never Smoker    Smokeless tobacco: Never Used   Substance and Sexual Activity    Alcohol use: No    Drug use: Not on file    Sexual activity: Not Currently     Review of Systems   Constitutional: Negative for chills and fever.   HENT: Negative for congestion.    Eyes: Negative for visual disturbance.   Respiratory: Negative for shortness of breath.    Cardiovascular: Negative for  chest pain and palpitations.   Gastrointestinal: Negative for abdominal pain, diarrhea, nausea and vomiting.   Genitourinary: Negative for dysuria.   Musculoskeletal: Positive for arthralgias and gait problem.   Skin: Negative for wound.   Neurological: Negative for dizziness, syncope and headaches.   Hematological: Does not bruise/bleed easily.   Psychiatric/Behavioral: Negative for confusion and hallucinations.     Objective:     Vital Signs (Most Recent):  Temp: 99 °F (37.2 °C) (10/28/18 0214)  Pulse: 84 (10/28/18 0214)  Resp: 16 (10/28/18 0214)  BP: 136/74 (10/28/18 0002)  SpO2: (!) 93 % (10/28/18 0214) Vital Signs (24h Range):  Temp:  [98.3 °F (36.8 °C)-99 °F (37.2 °C)] 99 °F (37.2 °C)  Pulse:  [69-84] 84  Resp:  [16-20] 16  SpO2:  [88 %-95 %] 93 %  BP: (129-150)/(71-74) 136/74     Weight: 99.8 kg (220 lb)  Body mass index is 30.68 kg/m².    Physical Exam   Constitutional: He is oriented to person, place, and time. No distress.   HENT:   Head: Normocephalic.   [place]   Eyes: Pupils are equal, round, and reactive to light.   Neck: Normal range of motion. Neck supple. No JVD present. No tracheal deviation present.   Cardiovascular: Normal rate, regular rhythm, normal heart sounds and intact distal pulses.   No murmur heard.  Pulmonary/Chest: Effort normal and breath sounds normal. No respiratory distress.   Abdominal: Soft. Bowel sounds are normal. He exhibits no distension. There is no tenderness.   Musculoskeletal: He exhibits tenderness. He exhibits no edema.   LROM LLE due to hip pain   Neurological: He is alert and oriented to person, place, and time. No cranial nerve deficit.   Skin: Skin is warm, dry and intact. Capillary refill takes less than 2 seconds.   Psychiatric: He has a normal mood and affect. His behavior is normal. Judgment and thought content normal.         CRANIAL NERVES     CN III, IV, VI   Pupils are equal, round, and reactive to light.       Significant Labs:   CBC:   Recent Labs   Lab  10/28/18  0111   WBC 9.70   HGB 13.9*   HCT 40.1        CMP:   Recent Labs   Lab 10/28/18  0111      K 3.8      CO2 21*      BUN 20   CREATININE 0.8   CALCIUM 9.4   PROT 7.8   ALBUMIN 3.8   BILITOT 0.6   ALKPHOS 81   AST 25   ALT 16   ANIONGAP 12   EGFRNONAA >60       Significant Imaging:     CXR: Reviewed film-- looks ok    Left Hip Xray: Radiologist's report not downloaded.

## 2018-10-28 NOTE — PROGRESS NOTES
"Patient arrived to the floor via stretcher from ER in stable condition with family at bedside. Patient and family oriented to room and call light. Family members verbalized understanding. Patient with mild confusion and agitation once transferred to floor. Patient trying to stand up and get out of bed states, " I have to go to the bathroom". Zyprex IM given x1 and Orozco catheter placed. Avasys system placed in room for safety. Patient son at bedside. Will continue to monitor.  "

## 2018-10-28 NOTE — H&P
[facility] Hospital Medicine  History & Physical    Patient Name: Adrian Osorio  MRN: [id number]  Admission Date: 10/27/2018  Attending Physician: Petra Mccollum MD   Primary Care Provider: Martín Perales MD         Patient information was obtained from patient, relative(s), past medical records and ER records.     Subjective:     Principal Problem:Left displaced femoral neck fracture    Chief Complaint:   Chief Complaint   Patient presents with    Hip Pain     fall approx 1 hour ago.  Lt hip and leg pain        HPI: Mr. Osorio is an 84yo M with a PMH of Dementia, HTN, CAD/CABG 2008, CHF, ICMO with EF 35%. He presents to the ED with c/o left hip pain. It started after having a fall while in his room. While in the ED, he had a left hip Xray which showed a femoral neck fracture. His family is at bedside. He is currently complaining left hip pain and sates that the Morphine did not help.      Past Medical History:   Diagnosis Date    CHF (congestive heart failure)     Coronary artery disease     Hypertension        Past Surgical History:   Procedure Laterality Date    CARDIAC SURGERY      cabg       Review of patient's allergies indicates:  No Known Allergies    No current facility-administered medications on file prior to encounter.      Current Outpatient Medications on File Prior to Encounter   Medication Sig    amLODIPine (NORVASC) 5 MG tablet Take 5 mg by mouth 2 (two) times daily.    aspirin 81 MG chewable tablet Take 81 mg by mouth once daily.      carvedilol (COREG) 6.25 MG tablet Take 6.25 mg by mouth 2 (two) times daily with meals.      donepezil (ARICEPT) 5 MG tablet Take 5 mg by mouth every evening.    memantine (NAMENDA) 10 MG tablet Take 10 mg by mouth 2 (two) times daily.      potassium chloride SA (K-DUR,KLOR-CON) 20 MEQ tablet Take 20 mEq by mouth once daily.     QUEtiapine (SEROQUEL) 25 MG Tab Take 25 mg by mouth 2 (two) times daily. Take 1-2 tablets by  mouth 2 times daily    valsartan (DIOVAN) 160 MG tablet Take 160 mg by mouth once daily.    atorvastatin (LIPITOR) 80 MG tablet Take 80 mg by mouth once daily.      ergocalciferol (ERGOCALCIFEROL) 50,000 unit Cap Take 50,000 Units by mouth every 14 (fourteen) days.    furosemide (LASIX) 40 MG tablet Take 40 mg by mouth once daily.      losartan (COZAAR) 100 MG tablet Take 100 mg by mouth once daily.       Family History     Mother: -- CAD  Father: -- CAD  Sister: MI  Brother: HTN, CVA        Tobacco Use    Smoking status: Never Smoker    Smokeless tobacco: Never Used   Substance and Sexual Activity    Alcohol use: No    Drug use: Not on file    Sexual activity: Not Currently     Review of Systems   Constitutional: Negative for chills and fever.   HENT: Negative for congestion.    Eyes: Negative for visual disturbance.   Respiratory: Negative for shortness of breath.    Cardiovascular: Negative for chest pain and palpitations.   Gastrointestinal: Negative for abdominal pain, diarrhea, nausea and vomiting.   Genitourinary: Negative for dysuria.   Musculoskeletal: Positive for arthralgias and gait problem.   Skin: Negative for wound.   Neurological: Negative for dizziness, syncope and headaches.   Hematological: Does not bruise/bleed easily.   Psychiatric/Behavioral: Negative for confusion and hallucinations.     Objective:     Vital Signs (Most Recent):  Temp: 99 °F (37.2 °C) (10/28/18 0214)  Pulse: 84 (10/28/18 0214)  Resp: 16 (10/28/18 0214)  BP: 136/74 (10/28/18 0002)  SpO2: (!) 93 % (10/28/18 0214) Vital Signs (24h Range):  Temp:  [98.3 °F (36.8 °C)-99 °F (37.2 °C)] 99 °F (37.2 °C)  Pulse:  [69-84] 84  Resp:  [16-20] 16  SpO2:  [88 %-95 %] 93 %  BP: (129-150)/(71-74) 136/74     Weight: 99.8 kg (220 lb)  Body mass index is 30.68 kg/m².    Physical Exam   Constitutional: He is oriented to person, place, and time. No distress.   HENT:   Head: Normocephalic.   [place]   Eyes: Pupils are equal,  round, and reactive to light.   Neck: Normal range of motion. Neck supple. No JVD present. No tracheal deviation present.   Cardiovascular: Normal rate, regular rhythm, normal heart sounds and intact distal pulses.   No murmur heard.  Pulmonary/Chest: Effort normal and breath sounds normal. No respiratory distress.   Abdominal: Soft. Bowel sounds are normal. He exhibits no distension. There is no tenderness.   Musculoskeletal: He exhibits tenderness. He exhibits no edema.   LROM LLE due to hip pain   Neurological: He is alert and oriented to person, place, and time. No cranial nerve deficit.   Skin: Skin is warm, dry and intact. Capillary refill takes less than 2 seconds.   Psychiatric: He has a normal mood and affect. His behavior is normal. Judgment and thought content normal.         CRANIAL NERVES     CN III, IV, VI   Pupils are equal, round, and reactive to light.       Significant Labs:   CBC:   Recent Labs   Lab 10/28/18  0111   WBC 9.70   HGB 13.9*   HCT 40.1        CMP:   Recent Labs   Lab 10/28/18  0111      K 3.8      CO2 21*      BUN 20   CREATININE 0.8   CALCIUM 9.4   PROT 7.8   ALBUMIN 3.8   BILITOT 0.6   ALKPHOS 81   AST 25   ALT 16   ANIONGAP 12   EGFRNONAA >60       Significant Imaging:     CXR: Reviewed film-- looks ok    Left Hip Xray: Radiologist's report not downloaded.    Assessment/Plan:     * Left displaced femoral neck fracture    NPO  Consult orthopedics  Hip Precautions  Pain management       Alzheimer's dementia with behavioral disturbance    Stable. Continue Namenda, Aricept, and Seroquel.  Delirium precautions.       Chronic systolic heart failure/ ICMO EF 40%    Stable.  Continue BB, diuretic, and ARB  Monitor on telemetry  Weigh daily  Last Echo 10/24/18: The estimated LV ejection fraction is 40 %. LV systolic function is mildly to moderately reduced. Diastolic function is indeterminate.       Essential hypertension    Chronic/Controlled. Continue chronic  medications, adjusting as needed.         VTE Risk Mitigation (From admission, onward)        Ordered     Place sequential compression device  Until discontinued      10/28/18 0204     IP VTE HIGH RISK PATIENT  Once      10/28/18 0204     Place sequential compression device  Until discontinued      10/28/18 0204             Kari Denney NP  Department of Hospital Medicine   [facility]

## 2018-10-28 NOTE — PROGRESS NOTES
[facility]  Orthopedics  Progress Note    Patient Name: Adrian Osorio  MRN: [id number]  Admission Date: 10/27/2018  Hospital Length of Stay: 0 days  Attending Provider: Petra Mccollmu MD  Primary Care Provider: Martín Perales MD    Subjective:     Past Medical History:   Diagnosis Date    CHF (congestive heart failure)     Coronary artery disease     Hypertension        Past Surgical History:   Procedure Laterality Date    CARDIAC SURGERY      cabg       Review of patient's allergies indicates:  No Known Allergies    Current Facility-Administered Medications   Medication    acetaminophen tablet 650 mg    albuterol sulfate nebulizer solution 2.5 mg    amLODIPine tablet 5 mg    carvedilol tablet 6.25 mg    donepezil tablet 5 mg    hydromorphone (PF) injection 1 mg    influenza (FLUZONE HIGH-DOSE) vaccine 0.5 mL    memantine tablet 10 mg    ondansetron injection 4 mg    pneumoc 13-lolly conj-dip cr(PF) 0.5 mL    polyethylene glycol packet 17 g    QUEtiapine tablet 25 mg    ramelteon tablet 8 mg    senna-docusate 8.6-50 mg per tablet 1 tablet    sodium chloride 0.9% flush 5 mL    valsartan tablet 160 mg     Family History     Problem Relation (Age of Onset)    Heart attack Sister    Heart disease Mother, Father    Hypertension Brother    Stroke Brother        Tobacco Use    Smoking status: Never Smoker    Smokeless tobacco: Never Used   Substance and Sexual Activity    Alcohol use: No    Drug use: Not on file    Sexual activity: Not Currently     Review of Systems   Constitution: Negative for chills, decreased appetite, diaphoresis, fever and weakness.   HENT: Negative for congestion.    Eyes: Negative for blurred vision and discharge.   Cardiovascular: Negative for chest pain.   Respiratory: Negative for cough and hemoptysis.    Endocrine: Negative for cold intolerance and heat intolerance.   Gastrointestinal: Negative for abdominal pain and anorexia.  "  Neurological: Negative for brief paralysis and headaches.   Psychiatric/Behavioral: Positive for altered mental status and memory loss.        Dementia     Objective:     Vital Signs (Most Recent):  Temp: 98.2 °F (36.8 °C) (10/28/18 0810)  Pulse: 86 (10/28/18 0810)  Resp: 18 (10/28/18 0810)  BP: (!) 159/74 (10/28/18 0810)  SpO2: (!) 93 % (10/28/18 0214) Vital Signs (24h Range):  Temp:  [98.2 °F (36.8 °C)-99 °F (37.2 °C)] 98.2 °F (36.8 °C)  Pulse:  [69-86] 86  Resp:  [16-20] 18  SpO2:  [88 %-95 %] 93 %  BP: (129-159)/(70-74) 159/74     Weight: 89 kg (196 lb 4.8 oz)  Height: 5' 11" (180.3 cm)  Body mass index is 27.38 kg/m².      Intake/Output Summary (Last 24 hours) at 10/28/2018 1044  Last data filed at 10/28/2018 0600  Gross per 24 hour   Intake 0 ml   Output 600 ml   Net -600 ml                   Right Hip Exam     Comments:  R hip with 2 small open wounds due to vascular disease. Currently being treated by Podiatry.  Left Hip Exam     Comments:  LLE DNVI. L foot with bluish tint, weak DP pulse, and delayed cap refill. Hip is ER. No open wounds LLE.            Significant Labs:   CBC:   Recent Labs   Lab 10/28/18  0111 10/28/18  0434   WBC 9.70 11.20   HGB 13.9* 13.8*   HCT 40.1 41.3    206     CMP:   Recent Labs   Lab 10/28/18  0111 10/28/18  0434    137   K 3.8 3.2*    104   CO2 21* 23    139*   BUN 20 18   CREATININE 0.8 0.8   CALCIUM 9.4 8.7   PROT 7.8  --    ALBUMIN 3.8  --    BILITOT 0.6  --    ALKPHOS 81  --    AST 25  --    ALT 16  --    ANIONGAP 12 10   EGFRNONAA >60 >60     All pertinent labs within the past 24 hours have been reviewed.    Significant Imaging:  X-Ray: I have reviewed all pertinent results/findings and my personal findings are:  Acute, displaced subcapital left femoral neck fracture.    Assessment/Plan:     * Left displaced femoral neck fracture    Schedule L hip ORIF for tomorrow with Dr. Pura Núñez's traction LLE  NPO after MN - can eat today  Cardiology to " clear for surgery due to PMH  Hold ASA  Consult SNF and ppd for post-op             MERCEDES GUEVARA  Orthopedics  [facility]

## 2018-10-28 NOTE — PLAN OF CARE
Problem: Patient Care Overview  Goal: Plan of Care Review  Outcome: Ongoing (interventions implemented as appropriate)  Plan of care reviewed with patient. Patient verbalized complete understanding. Dr. Neves consulted this AM. Son at bedside. TASHA system in place. PRN pain meds administered as needed. All fall precautions maintained. Bed in lowest position, locked, call light within reach. Side rails up x's 2. Slip resistant socks maintained.

## 2018-10-28 NOTE — ASSESSMENT & PLAN NOTE
Schedule L hip ORIF for tomorrow with Dr. Pura Núñez's traction LLE  NPO after MN - can eat today  Cardiology to clear for surgery due to PMH  Hold ASA  Consult SNF and ppd for post-op

## 2018-10-28 NOTE — PLAN OF CARE
Discharge assessment completed by MAX Law....SUMIT Ly       10/28/18 4371   Discharge Assessment   Assessment Type Discharge Planning Assessment

## 2018-10-28 NOTE — HPI
Mr. 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 He presents to the ED with c/o left hip pain. It started after having a fall while in his room. While in the ED, he had a left hip Xray which showed a femoral neck fracture. His family is at bedside. He is currently complaining left hip pain and sates that the Morphine did not help.

## 2018-10-28 NOTE — PROGRESS NOTES
Patient agitated trying to stand up to use urinal. Refuses to stay in bed becoming aggressive with nurse and son. Bladder scan performed with greater than 200 in bladder. CHRIS Denney NP notified. Received orders to administer Zyprex IM and to place a berman. Patient tolerated berman and IM injection well.

## 2018-10-29 ENCOUNTER — ANESTHESIA (OUTPATIENT)
Dept: SURGERY | Facility: HOSPITAL | Age: 83
DRG: 470 | End: 2018-10-29
Payer: MEDICARE

## 2018-10-29 ENCOUNTER — ANESTHESIA EVENT (OUTPATIENT)
Dept: SURGERY | Facility: HOSPITAL | Age: 83
DRG: 470 | End: 2018-10-29
Payer: MEDICARE

## 2018-10-29 LAB
ABO + RH BLD: NORMAL
ANION GAP SERPL CALC-SCNC: 11 MMOL/L
BASOPHILS # BLD AUTO: 0 K/UL
BASOPHILS # BLD AUTO: 0 K/UL
BASOPHILS NFR BLD: 0.1 %
BASOPHILS NFR BLD: 0.4 %
BLD GP AB SCN CELLS X3 SERPL QL: NORMAL
BUN SERPL-MCNC: 26 MG/DL
CALCIUM SERPL-MCNC: 8.9 MG/DL
CHLORIDE SERPL-SCNC: 104 MMOL/L
CO2 SERPL-SCNC: 21 MMOL/L
CREAT SERPL-MCNC: 1 MG/DL
DIFFERENTIAL METHOD: ABNORMAL
DIFFERENTIAL METHOD: ABNORMAL
EOSINOPHIL # BLD AUTO: 0.3 K/UL
EOSINOPHIL # BLD AUTO: 0.5 K/UL
EOSINOPHIL NFR BLD: 2.8 %
EOSINOPHIL NFR BLD: 6 %
ERYTHROCYTE [DISTWIDTH] IN BLOOD BY AUTOMATED COUNT: 13.1 %
ERYTHROCYTE [DISTWIDTH] IN BLOOD BY AUTOMATED COUNT: 13.2 %
EST. GFR  (AFRICAN AMERICAN): >60 ML/MIN/1.73 M^2
EST. GFR  (NON AFRICAN AMERICAN): >60 ML/MIN/1.73 M^2
GLUCOSE SERPL-MCNC: 112 MG/DL
HCT VFR BLD AUTO: 31.7 %
HCT VFR BLD AUTO: 41.1 %
HGB BLD-MCNC: 10.6 G/DL
HGB BLD-MCNC: 13.9 G/DL
LYMPHOCYTES # BLD AUTO: 0.7 K/UL
LYMPHOCYTES # BLD AUTO: 0.8 K/UL
LYMPHOCYTES NFR BLD: 10 %
LYMPHOCYTES NFR BLD: 6.8 %
MCH RBC QN AUTO: 30.8 PG
MCH RBC QN AUTO: 30.9 PG
MCHC RBC AUTO-ENTMCNC: 33.4 G/DL
MCHC RBC AUTO-ENTMCNC: 33.8 G/DL
MCV RBC AUTO: 92 FL
MCV RBC AUTO: 92 FL
MONOCYTES # BLD AUTO: 0.4 K/UL
MONOCYTES # BLD AUTO: 0.7 K/UL
MONOCYTES NFR BLD: 4.3 %
MONOCYTES NFR BLD: 9.1 %
NEUTROPHILS # BLD AUTO: 5.7 K/UL
NEUTROPHILS # BLD AUTO: 8.8 K/UL
NEUTROPHILS NFR BLD: 74.5 %
NEUTROPHILS NFR BLD: 86 %
PLATELET # BLD AUTO: 138 K/UL
PLATELET # BLD AUTO: 170 K/UL
PMV BLD AUTO: 7.4 FL
PMV BLD AUTO: 7.6 FL
POTASSIUM SERPL-SCNC: 3.8 MMOL/L
RBC # BLD AUTO: 3.43 M/UL
RBC # BLD AUTO: 4.49 M/UL
SODIUM SERPL-SCNC: 136 MMOL/L
WBC # BLD AUTO: 10.3 K/UL
WBC # BLD AUTO: 7.7 K/UL

## 2018-10-29 PROCEDURE — 94761 N-INVAS EAR/PLS OXIMETRY MLT: CPT

## 2018-10-29 PROCEDURE — 80048 BASIC METABOLIC PNL TOTAL CA: CPT

## 2018-10-29 PROCEDURE — 27201423 OPTIME MED/SURG SUP & DEVICES STERILE SUPPLY: Performed by: ORTHOPAEDIC SURGERY

## 2018-10-29 PROCEDURE — 36415 COLL VENOUS BLD VENIPUNCTURE: CPT

## 2018-10-29 PROCEDURE — 25000003 PHARM REV CODE 250: Performed by: NURSE PRACTITIONER

## 2018-10-29 PROCEDURE — 86920 COMPATIBILITY TEST SPIN: CPT

## 2018-10-29 PROCEDURE — 36000710: Performed by: ORTHOPAEDIC SURGERY

## 2018-10-29 PROCEDURE — P9045 ALBUMIN (HUMAN), 5%, 250 ML: HCPCS | Performed by: NURSE ANESTHETIST, CERTIFIED REGISTERED

## 2018-10-29 PROCEDURE — 25000003 PHARM REV CODE 250: Performed by: ORTHOPAEDIC SURGERY

## 2018-10-29 PROCEDURE — 25000003 PHARM REV CODE 250: Performed by: NURSE ANESTHETIST, CERTIFIED REGISTERED

## 2018-10-29 PROCEDURE — P9016 RBC LEUKOCYTES REDUCED: HCPCS

## 2018-10-29 PROCEDURE — D9220A PRA ANESTHESIA: Mod: CRNA,,, | Performed by: NURSE ANESTHETIST, CERTIFIED REGISTERED

## 2018-10-29 PROCEDURE — 63600175 PHARM REV CODE 636 W HCPCS: Performed by: ORTHOPAEDIC SURGERY

## 2018-10-29 PROCEDURE — 63600175 PHARM REV CODE 636 W HCPCS: Performed by: HOSPITALIST

## 2018-10-29 PROCEDURE — 25000003 PHARM REV CODE 250: Performed by: ANESTHESIOLOGY

## 2018-10-29 PROCEDURE — 85025 COMPLETE CBC W/AUTO DIFF WBC: CPT | Mod: 91

## 2018-10-29 PROCEDURE — 0SCB0ZZ EXTIRPATION OF MATTER FROM LEFT HIP JOINT, OPEN APPROACH: ICD-10-PCS | Performed by: ORTHOPAEDIC SURGERY

## 2018-10-29 PROCEDURE — 94760 N-INVAS EAR/PLS OXIMETRY 1: CPT

## 2018-10-29 PROCEDURE — 71000033 HC RECOVERY, INTIAL HOUR: Performed by: ORTHOPAEDIC SURGERY

## 2018-10-29 PROCEDURE — 86850 RBC ANTIBODY SCREEN: CPT

## 2018-10-29 PROCEDURE — C1776 JOINT DEVICE (IMPLANTABLE): HCPCS | Performed by: ORTHOPAEDIC SURGERY

## 2018-10-29 PROCEDURE — 37000008 HC ANESTHESIA 1ST 15 MINUTES: Performed by: ORTHOPAEDIC SURGERY

## 2018-10-29 PROCEDURE — 27000221 HC OXYGEN, UP TO 24 HOURS

## 2018-10-29 PROCEDURE — 99900104 DSU ONLY-NO CHARGE-EA ADD'L HR (STAT): Performed by: ORTHOPAEDIC SURGERY

## 2018-10-29 PROCEDURE — S5010 5% DEXTROSE AND 0.45% SALINE: HCPCS | Performed by: ORTHOPAEDIC SURGERY

## 2018-10-29 PROCEDURE — 12000002 HC ACUTE/MED SURGE SEMI-PRIVATE ROOM

## 2018-10-29 PROCEDURE — D9220A PRA ANESTHESIA: Mod: ANES,,, | Performed by: ANESTHESIOLOGY

## 2018-10-29 PROCEDURE — 71000039 HC RECOVERY, EACH ADD'L HOUR: Performed by: ORTHOPAEDIC SURGERY

## 2018-10-29 PROCEDURE — 99900103 DSU ONLY-NO CHARGE-INITIAL HR (STAT): Performed by: ORTHOPAEDIC SURGERY

## 2018-10-29 PROCEDURE — 0SRS01A REPLACEMENT OF LEFT HIP JOINT, FEMORAL SURFACE WITH METAL SYNTHETIC SUBSTITUTE, UNCEMENTED, OPEN APPROACH: ICD-10-PCS | Performed by: ORTHOPAEDIC SURGERY

## 2018-10-29 PROCEDURE — 37000009 HC ANESTHESIA EA ADD 15 MINS: Performed by: ORTHOPAEDIC SURGERY

## 2018-10-29 PROCEDURE — 63600175 PHARM REV CODE 636 W HCPCS: Performed by: NURSE ANESTHETIST, CERTIFIED REGISTERED

## 2018-10-29 PROCEDURE — C1729 CATH, DRAINAGE: HCPCS | Performed by: ORTHOPAEDIC SURGERY

## 2018-10-29 PROCEDURE — 63600175 PHARM REV CODE 636 W HCPCS: Performed by: ANESTHESIOLOGY

## 2018-10-29 PROCEDURE — 36000711: Performed by: ORTHOPAEDIC SURGERY

## 2018-10-29 PROCEDURE — C1713 ANCHOR/SCREW BN/BN,TIS/BN: HCPCS | Performed by: ORTHOPAEDIC SURGERY

## 2018-10-29 PROCEDURE — 99900035 HC TECH TIME PER 15 MIN (STAT)

## 2018-10-29 DEVICE — IMPLANTABLE DEVICE
Type: IMPLANTABLE DEVICE | Site: HIP | Status: NON-FUNCTIONAL
Removed: 2018-12-24

## 2018-10-29 DEVICE — STEM FEM TAPER HIP SZ 6: Type: IMPLANTABLE DEVICE | Site: HIP | Status: FUNCTIONAL

## 2018-10-29 RX ORDER — CEFAZOLIN SODIUM 2 G/50ML
SOLUTION INTRAVENOUS
Status: COMPLETED
Start: 2018-10-29 | End: 2018-10-29

## 2018-10-29 RX ORDER — FENTANYL CITRATE 50 UG/ML
INJECTION, SOLUTION INTRAMUSCULAR; INTRAVENOUS
Status: DISPENSED
Start: 2018-10-29 | End: 2018-10-30

## 2018-10-29 RX ORDER — ASPIRIN 325 MG
325 TABLET ORAL 2 TIMES DAILY
Status: DISCONTINUED | OUTPATIENT
Start: 2018-10-29 | End: 2018-10-31 | Stop reason: HOSPADM

## 2018-10-29 RX ORDER — SODIUM CHLORIDE 9 MG/ML
INJECTION, SOLUTION INTRAVENOUS CONTINUOUS
Status: DISCONTINUED | OUTPATIENT
Start: 2018-10-29 | End: 2018-10-29

## 2018-10-29 RX ORDER — OXYCODONE HYDROCHLORIDE 5 MG/1
5 TABLET ORAL ONCE AS NEEDED
Status: COMPLETED | OUTPATIENT
Start: 2018-10-29 | End: 2018-10-29

## 2018-10-29 RX ORDER — ONDANSETRON HYDROCHLORIDE 2 MG/ML
INJECTION, SOLUTION INTRAMUSCULAR; INTRAVENOUS
Status: DISCONTINUED | OUTPATIENT
Start: 2018-10-29 | End: 2018-10-29

## 2018-10-29 RX ORDER — SODIUM CHLORIDE, SODIUM LACTATE, POTASSIUM CHLORIDE, CALCIUM CHLORIDE 600; 310; 30; 20 MG/100ML; MG/100ML; MG/100ML; MG/100ML
INJECTION, SOLUTION INTRAVENOUS CONTINUOUS
Status: DISCONTINUED | OUTPATIENT
Start: 2018-10-29 | End: 2018-10-29

## 2018-10-29 RX ORDER — PHENYLEPHRINE HYDROCHLORIDE 10 MG/ML
INJECTION INTRAVENOUS
Status: DISCONTINUED | OUTPATIENT
Start: 2018-10-29 | End: 2018-10-29

## 2018-10-29 RX ORDER — FENTANYL CITRATE 50 UG/ML
25 INJECTION, SOLUTION INTRAMUSCULAR; INTRAVENOUS EVERY 5 MIN PRN
Status: DISCONTINUED | OUTPATIENT
Start: 2018-10-29 | End: 2018-10-29 | Stop reason: HOSPADM

## 2018-10-29 RX ORDER — CEFAZOLIN SODIUM 1 G/50ML
1 SOLUTION INTRAVENOUS
Status: COMPLETED | OUTPATIENT
Start: 2018-10-29 | End: 2018-10-30

## 2018-10-29 RX ORDER — BISACODYL 10 MG
10 SUPPOSITORY, RECTAL RECTAL DAILY
Status: DISCONTINUED | OUTPATIENT
Start: 2018-10-30 | End: 2018-10-31 | Stop reason: HOSPADM

## 2018-10-29 RX ORDER — SUCCINYLCHOLINE CHLORIDE 20 MG/ML
INJECTION INTRAMUSCULAR; INTRAVENOUS
Status: DISCONTINUED | OUTPATIENT
Start: 2018-10-29 | End: 2018-10-29

## 2018-10-29 RX ORDER — SODIUM CHLORIDE 0.9 % (FLUSH) 0.9 %
5 SYRINGE (ML) INJECTION
Status: DISCONTINUED | OUTPATIENT
Start: 2018-10-29 | End: 2018-10-31 | Stop reason: HOSPADM

## 2018-10-29 RX ORDER — FAMOTIDINE 20 MG/1
20 TABLET, FILM COATED ORAL 2 TIMES DAILY
Status: DISCONTINUED | OUTPATIENT
Start: 2018-10-29 | End: 2018-10-31

## 2018-10-29 RX ORDER — DEXTROSE MONOHYDRATE AND SODIUM CHLORIDE 5; .45 G/100ML; G/100ML
INJECTION, SOLUTION INTRAVENOUS CONTINUOUS
Status: DISCONTINUED | OUTPATIENT
Start: 2018-10-29 | End: 2018-10-31

## 2018-10-29 RX ORDER — ALBUMIN HUMAN 50 G/1000ML
SOLUTION INTRAVENOUS CONTINUOUS PRN
Status: DISCONTINUED | OUTPATIENT
Start: 2018-10-29 | End: 2018-10-29

## 2018-10-29 RX ORDER — FENTANYL CITRATE 50 UG/ML
25 INJECTION, SOLUTION INTRAMUSCULAR; INTRAVENOUS
Status: DISCONTINUED | OUTPATIENT
Start: 2018-10-29 | End: 2018-10-29

## 2018-10-29 RX ORDER — CEFAZOLIN SODIUM 2 G/50ML
2 SOLUTION INTRAVENOUS ONCE
Status: COMPLETED | OUTPATIENT
Start: 2018-10-29 | End: 2018-10-29

## 2018-10-29 RX ORDER — ROCURONIUM BROMIDE 10 MG/ML
INJECTION, SOLUTION INTRAVENOUS
Status: DISCONTINUED | OUTPATIENT
Start: 2018-10-29 | End: 2018-10-29

## 2018-10-29 RX ORDER — HYDROCODONE BITARTRATE AND ACETAMINOPHEN 5; 325 MG/1; MG/1
1 TABLET ORAL EVERY 4 HOURS PRN
Status: DISCONTINUED | OUTPATIENT
Start: 2018-10-29 | End: 2018-10-31 | Stop reason: HOSPADM

## 2018-10-29 RX ORDER — LIDOCAINE HCL/PF 100 MG/5ML
SYRINGE (ML) INTRAVENOUS
Status: DISCONTINUED | OUTPATIENT
Start: 2018-10-29 | End: 2018-10-29

## 2018-10-29 RX ORDER — EPHEDRINE SULFATE 50 MG/ML
INJECTION, SOLUTION INTRAVENOUS
Status: DISCONTINUED | OUTPATIENT
Start: 2018-10-29 | End: 2018-10-29

## 2018-10-29 RX ORDER — ONDANSETRON 4 MG/1
8 TABLET, ORALLY DISINTEGRATING ORAL EVERY 8 HOURS PRN
Status: DISCONTINUED | OUTPATIENT
Start: 2018-10-29 | End: 2018-10-31 | Stop reason: HOSPADM

## 2018-10-29 RX ORDER — PROPOFOL 10 MG/ML
VIAL (ML) INTRAVENOUS
Status: DISCONTINUED | OUTPATIENT
Start: 2018-10-29 | End: 2018-10-29

## 2018-10-29 RX ORDER — MUPIROCIN 20 MG/G
1 OINTMENT TOPICAL 2 TIMES DAILY
Status: DISCONTINUED | OUTPATIENT
Start: 2018-10-29 | End: 2018-10-31 | Stop reason: HOSPADM

## 2018-10-29 RX ORDER — ZOLPIDEM TARTRATE 5 MG/1
5 TABLET ORAL NIGHTLY PRN
Status: DISCONTINUED | OUTPATIENT
Start: 2018-10-29 | End: 2018-10-30

## 2018-10-29 RX ORDER — FENTANYL CITRATE 50 UG/ML
INJECTION, SOLUTION INTRAMUSCULAR; INTRAVENOUS
Status: DISCONTINUED | OUTPATIENT
Start: 2018-10-29 | End: 2018-10-29

## 2018-10-29 RX ORDER — HYDROCODONE BITARTRATE AND ACETAMINOPHEN 500; 5 MG/1; MG/1
TABLET ORAL
Status: DISCONTINUED | OUTPATIENT
Start: 2018-10-29 | End: 2018-10-29

## 2018-10-29 RX ORDER — ONDANSETRON 2 MG/ML
4 INJECTION INTRAMUSCULAR; INTRAVENOUS ONCE AS NEEDED
Status: DISCONTINUED | OUTPATIENT
Start: 2018-10-29 | End: 2018-10-29 | Stop reason: HOSPADM

## 2018-10-29 RX ADMIN — PHENYLEPHRINE HYDROCHLORIDE 200 MCG: 10 INJECTION INTRAVENOUS at 04:10

## 2018-10-29 RX ADMIN — PHENYLEPHRINE HYDROCHLORIDE 100 MCG: 10 INJECTION INTRAVENOUS at 03:10

## 2018-10-29 RX ADMIN — CARVEDILOL 6.25 MG: 6.25 TABLET, FILM COATED ORAL at 09:10

## 2018-10-29 RX ADMIN — MEMANTINE HYDROCHLORIDE 10 MG: 5 TABLET ORAL at 08:10

## 2018-10-29 RX ADMIN — FENTANYL CITRATE 50 MCG: 50 INJECTION, SOLUTION INTRAMUSCULAR; INTRAVENOUS at 02:10

## 2018-10-29 RX ADMIN — AMLODIPINE BESYLATE 5 MG: 5 TABLET ORAL at 09:10

## 2018-10-29 RX ADMIN — MUPIROCIN 1 G: 20 OINTMENT TOPICAL at 08:10

## 2018-10-29 RX ADMIN — POLYETHYLENE GLYCOL 3350 17 G: 17 POWDER, FOR SOLUTION ORAL at 09:10

## 2018-10-29 RX ADMIN — VALSARTAN 160 MG: 80 TABLET, FILM COATED ORAL at 09:10

## 2018-10-29 RX ADMIN — CEFAZOLIN SODIUM 2 G: 2 SOLUTION INTRAVENOUS at 03:10

## 2018-10-29 RX ADMIN — SODIUM CHLORIDE, SODIUM LACTATE, POTASSIUM CHLORIDE, AND CALCIUM CHLORIDE: .6; .31; .03; .02 INJECTION, SOLUTION INTRAVENOUS at 03:10

## 2018-10-29 RX ADMIN — ONDANSETRON 4 MG: 2 INJECTION, SOLUTION INTRAMUSCULAR; INTRAVENOUS at 03:10

## 2018-10-29 RX ADMIN — FAMOTIDINE 20 MG: 20 TABLET ORAL at 08:10

## 2018-10-29 RX ADMIN — DEXTROSE AND SODIUM CHLORIDE: 5; .45 INJECTION, SOLUTION INTRAVENOUS at 08:10

## 2018-10-29 RX ADMIN — SODIUM CHLORIDE, SODIUM LACTATE, POTASSIUM CHLORIDE, AND CALCIUM CHLORIDE: .6; .31; .03; .02 INJECTION, SOLUTION INTRAVENOUS at 10:10

## 2018-10-29 RX ADMIN — PROPOFOL 50 MG: 10 INJECTION, EMULSION INTRAVENOUS at 02:10

## 2018-10-29 RX ADMIN — EPHEDRINE SULFATE 25 MG: 50 INJECTION, SOLUTION INTRAMUSCULAR; INTRAVENOUS; SUBCUTANEOUS at 03:10

## 2018-10-29 RX ADMIN — OXYCODONE HYDROCHLORIDE 5 MG: 5 TABLET ORAL at 05:10

## 2018-10-29 RX ADMIN — AMLODIPINE BESYLATE 5 MG: 5 TABLET ORAL at 08:10

## 2018-10-29 RX ADMIN — FENTANYL CITRATE 25 MCG: 50 INJECTION INTRAMUSCULAR; INTRAVENOUS at 01:10

## 2018-10-29 RX ADMIN — LIDOCAINE HYDROCHLORIDE 50 MG: 20 INJECTION, SOLUTION INTRAVENOUS at 02:10

## 2018-10-29 RX ADMIN — QUETIAPINE FUMARATE 25 MG: 25 TABLET ORAL at 09:10

## 2018-10-29 RX ADMIN — ZOLPIDEM TARTRATE 5 MG: 5 TABLET ORAL at 11:10

## 2018-10-29 RX ADMIN — MEMANTINE HYDROCHLORIDE 10 MG: 5 TABLET ORAL at 09:10

## 2018-10-29 RX ADMIN — DONEPEZIL HYDROCHLORIDE 5 MG: 5 TABLET, FILM COATED ORAL at 08:10

## 2018-10-29 RX ADMIN — PHENYLEPHRINE HYDROCHLORIDE 100 MCG: 10 INJECTION INTRAVENOUS at 04:10

## 2018-10-29 RX ADMIN — SUCCINYLCHOLINE CHLORIDE 100 MG: 20 INJECTION, SOLUTION INTRAMUSCULAR; INTRAVENOUS at 02:10

## 2018-10-29 RX ADMIN — ALBUMIN (HUMAN): 12.5 SOLUTION INTRAVENOUS at 04:10

## 2018-10-29 RX ADMIN — SODIUM CHLORIDE: 0.9 INJECTION, SOLUTION INTRAVENOUS at 04:10

## 2018-10-29 RX ADMIN — ROCURONIUM BROMIDE 5 MG: 10 INJECTION, SOLUTION INTRAVENOUS at 02:10

## 2018-10-29 RX ADMIN — ALBUMIN (HUMAN): 12.5 SOLUTION INTRAVENOUS at 03:10

## 2018-10-29 RX ADMIN — CEFAZOLIN SODIUM 1 G: 1 SOLUTION INTRAVENOUS at 11:10

## 2018-10-29 RX ADMIN — ASPIRIN 325 MG ORAL TABLET 325 MG: 325 PILL ORAL at 08:10

## 2018-10-29 RX ADMIN — FENTANYL CITRATE 50 MCG: 50 INJECTION, SOLUTION INTRAMUSCULAR; INTRAVENOUS at 03:10

## 2018-10-29 RX ADMIN — QUETIAPINE FUMARATE 25 MG: 25 TABLET ORAL at 08:10

## 2018-10-29 NOTE — SUBJECTIVE & OBJECTIVE
Interval History: Patient seen and examined. No acute events overnight. Discussed with ortho. Plan for surgery today.    Review of Systems   Constitutional: Negative for chills and fever.   HENT: Negative for congestion.    Eyes: Negative for visual disturbance.   Respiratory: Negative for shortness of breath.    Cardiovascular: Negative for chest pain and palpitations.   Gastrointestinal: Negative for abdominal pain, diarrhea, nausea and vomiting.   Genitourinary: Negative for dysuria.   Musculoskeletal: Positive for arthralgias and gait problem.   Skin: Negative for wound.   Neurological: Negative for dizziness, syncope and headaches.   Hematological: Does not bruise/bleed easily.   Psychiatric/Behavioral: Negative for confusion and hallucinations.   All other systems reviewed and are negative.    Objective:     Vital Signs (Most Recent):  Temp: 97.7 °F (36.5 °C) (10/29/18 1801)  Pulse: 62 (10/29/18 1801)  Resp: 18 (10/29/18 1801)  BP: (!) 148/65 (10/29/18 1801)  SpO2: 96 % (10/29/18 1801) Vital Signs (24h Range):  Temp:  [96.5 °F (35.8 °C)-99 °F (37.2 °C)] 97.7 °F (36.5 °C)  Pulse:  [59-82] 62  Resp:  [14-20] 18  SpO2:  [90 %-98 %] 96 %  BP: (121-148)/(59-67) 148/65     Weight: 96.6 kg (213 lb)  Body mass index is 29.71 kg/m².    Intake/Output Summary (Last 24 hours) at 10/29/2018 1851  Last data filed at 10/29/2018 1638  Gross per 24 hour   Intake 1600 ml   Output 1450 ml   Net 150 ml      Physical Exam   Constitutional: No distress.   HENT:   Head: Normocephalic.   Very [place]   Eyes: Pupils are equal, round, and reactive to light.   Neck: Normal range of motion. Neck supple. No JVD present. No tracheal deviation present.   Cardiovascular: Normal rate, regular rhythm, normal heart sounds and intact distal pulses.   No murmur heard.  Pulmonary/Chest: Effort normal and breath sounds normal. No respiratory distress.   Abdominal: Soft. Bowel sounds are normal. He exhibits no distension. There is no tenderness.    Musculoskeletal: He exhibits tenderness. He exhibits no edema.   LROM LLE due to hip pain   Neurological: He is alert. No cranial nerve deficit.   Skin: Skin is warm, dry and intact. Capillary refill takes less than 2 seconds.   Psychiatric: He has a normal mood and affect. His behavior is normal.       Significant Labs: All pertinent labs within the past 24 hours have been reviewed.    Significant Imaging: I have reviewed and interpreted all pertinent imaging results/findings within the past 24 hours.

## 2018-10-29 NOTE — ANESTHESIA PREPROCEDURE EVALUATION
10/29/2018  Adrian Osorio is a 85 y.o., male.    Anesthesia Evaluation    I have reviewed the Patient Summary Reports.    I have reviewed the Nursing Notes.   I have reviewed the Medications.     Review of Systems  Anesthesia Hx:  No problems with previous Anesthesia    Social:  No Alcohol Use    Hematology/Oncology:  Hematology Normal   Oncology Normal     EENT/Dental:EENT/Dental Normal   Cardiovascular:   Hypertension, well controlled CAD   CHF EF 40%   Pulmonary:  Pulmonary Normal    Renal/:  Renal/ Normal     Hepatic/GI:  Hepatic/GI Normal    Neurological:  Dementia    Endocrine:  Endocrine Normal    Psych:   Psychiatric History          Physical Exam  General:  Well nourished    Airway/Jaw/Neck:  Airway Findings: Mouth Opening: Normal Tongue: Normal  General Airway Assessment: Adult  Mallampati: I     Eyes/Ears/Nose:  EYES/EARS/NOSE FINDINGS: Normal   Dental:  Dental Findings: Edentulous   Chest/Lungs:  Chest/Lungs Findings: Clear to auscultation, Normal Respiratory Rate     Heart/Vascular:  Heart Findings: Rate: Normal  Rhythm: Regular Rhythm        Mental Status:  Mental Status Findings:  Confusion, Cooperative         Anesthesia Plan  Type of Anesthesia, risks & benefits discussed:  Anesthesia Type:  general  Patient's Preference:   Intra-op Monitoring Plan: standard ASA monitors  Intra-op Monitoring Plan Comments:   Post Op Pain Control Plan: IV/PO Opioids PRN  Post Op Pain Control Plan Comments:   Induction:   IV  Beta Blocker:  Patient is on a Beta-Blocker and has received one dose within the past 24 hours (No further documentation required).       Informed Consent: Patient representative understands risks and agrees with Anesthesia plan.  Questions answered. Anesthesia consent signed with patient representative.  ASA Score: 3     Day of Surgery Review of History & Physical:    H&P update  referred to the surgeon.         Ready For Surgery From Anesthesia Perspective.

## 2018-10-29 NOTE — PLAN OF CARE
Problem: Patient Care Overview  Goal: Plan of Care Review  Outcome: Ongoing (interventions implemented as appropriate)  Plan of care discussed with patient. Daughter at bedside. VS stable. Alert, awake and oriented to self. Orozco catheter clean and intact draining clear yellow urine. SCDs. Avasys at bedside. PPD in right FA. PIV 20g to right FA clean, dry and intact saline locked. Tele [phone]. No signs of distress. Bed in lowest position, bed wheels locked and call button within reach. Will continue to monitor.

## 2018-10-29 NOTE — PLAN OF CARE
10/29/18 0828   Patient Assessment/Suction   Respiratory Effort Normal;Unlabored   PRE-TX-O2-ETCO2   O2 Device (Oxygen Therapy) nasal cannula w/ humidification   $ Is the patient on Low Flow Oxygen? Yes   Flow (L/min) 4   Oxygen Concentration (%) 32   SpO2 (!) 90 %   Pulse Oximetry Type Intermittent   $ Pulse Oximetry - Multiple Charge Pulse Oximetry - Multiple   Aerosol Therapy   $ Aerosol Therapy Charges PRN treatment not required  (Pt asleep at this time)   Respiratory Treatment Status PRN treatment not required  (Pt asleep at this time.)   Ready to Wean/Extubation Screen   FIO2<=50 (chart decimal) 0.32

## 2018-10-29 NOTE — BRIEF OP NOTE
[facility]  Brief Operative Note    SUMMARY     Surgery Date: 10/29/2018     Surgeon(s) and Role:     * Brando Neves MD - Primary    Assisting Surgeon: None    Pre-op Diagnosis:  Left displaced femoral neck fracture [S72.002A]    Post-op Diagnosis:  Post-Op Diagnosis Codes:     * Left displaced femoral neck fracture [S72.002A]    Procedure(s) (LRB):  ARTHROPLASTY, HIP REPLACEMENT  BELLO HIP (Left)    Anesthesia: General    Description of Procedure: bello l hip     Description of the findings of the procedure: dictated [id number]    Estimated Blood Loss: 600 mL         Specimens:   Specimen (12h ago, onward)    None

## 2018-10-29 NOTE — TRANSFER OF CARE
"Anesthesia Transfer of Care Note    Patient: Adrian Osorio    Procedure(s) Performed: Procedure(s) (LRB):  ARTHROPLASTY, HIP REPLACEMENT  BELLO HIP (Left)    Patient location: PACU    Anesthesia Type: general    Transport from OR: Transported from OR on 6-10 L/min O2 by face mask with adequate spontaneous ventilation    Post pain: adequate analgesia    Post assessment: no apparent anesthetic complications and tolerated procedure well    Post vital signs: stable    Level of consciousness: awake and alert    Nausea/Vomiting: no nausea/vomiting    Complications: none    Transfer of care protocol was followed      Last vitals:   Visit Vitals  /67 (BP Location: Left arm, Patient Position: Lying)   Pulse (!) 59   Temp 37.2 °C (99 °F) (Skin)   Resp 20   Ht 5' 11" (1.803 m)   Wt 96.6 kg (213 lb)   SpO2 95%   BMI 29.71 kg/m²     "

## 2018-10-29 NOTE — NURSING
JANAY Denney NP notified of uop @ 1800= 600 cc, MN = 100cc, 0400 = 50cc. Patient NPO since MN. Order received for IV fluids. Will continue to monitor.

## 2018-10-29 NOTE — PROGRESS NOTES
[facility] Hospital Medicine  Progress Note    Patient Name: Adrian Osorio  MRN: [id number]  Patient Class: IP- Inpatient   Admission Date: 10/27/2018  Length of Stay: 1 days  Attending Physician: Barbie Montes De Oca MD  Primary Care Provider: Martín Perales MD        Subjective:     Principal Problem:Left displaced femoral neck fracture    HPI:  Mr. 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 He presents to the ED with c/o left hip pain. It started after having a fall while in his room. While in the ED, he had a left hip Xray which showed a femoral neck fracture. His family is at bedside. He is currently complaining left hip pain and sates that the Morphine did not help.      Hospital Course:  No notes on file    Interval History: Patient seen and examined. No acute events overnight. Discussed with ortho. Plan for surgery today.    Review of Systems   Constitutional: Negative for chills and fever.   HENT: Negative for congestion.    Eyes: Negative for visual disturbance.   Respiratory: Negative for shortness of breath.    Cardiovascular: Negative for chest pain and palpitations.   Gastrointestinal: Negative for abdominal pain, diarrhea, nausea and vomiting.   Genitourinary: Negative for dysuria.   Musculoskeletal: Positive for arthralgias and gait problem.   Skin: Negative for wound.   Neurological: Negative for dizziness, syncope and headaches.   Hematological: Does not bruise/bleed easily.   Psychiatric/Behavioral: Negative for confusion and hallucinations.   All other systems reviewed and are negative.    Objective:     Vital Signs (Most Recent):  Temp: 97.7 °F (36.5 °C) (10/29/18 1801)  Pulse: 62 (10/29/18 1801)  Resp: 18 (10/29/18 1801)  BP: (!) 148/65 (10/29/18 1801)  SpO2: 96 % (10/29/18 1801) Vital Signs (24h Range):  Temp:  [96.5 °F (35.8 °C)-99 °F (37.2 °C)] 97.7 °F (36.5 °C)  Pulse:  [59-82] 62  Resp:  [14-20] 18  SpO2:  [90 %-98  %] 96 %  BP: (121-148)/(59-67) 148/65     Weight: 96.6 kg (213 lb)  Body mass index is 29.71 kg/m².    Intake/Output Summary (Last 24 hours) at 10/29/2018 1851  Last data filed at 10/29/2018 1638  Gross per 24 hour   Intake 1600 ml   Output 1450 ml   Net 150 ml      Physical Exam   Constitutional: No distress.   HENT:   Head: Normocephalic.   Very [place]   Eyes: Pupils are equal, round, and reactive to light.   Neck: Normal range of motion. Neck supple. No JVD present. No tracheal deviation present.   Cardiovascular: Normal rate, regular rhythm, normal heart sounds and intact distal pulses.   No murmur heard.  Pulmonary/Chest: Effort normal and breath sounds normal. No respiratory distress.   Abdominal: Soft. Bowel sounds are normal. He exhibits no distension. There is no tenderness.   Musculoskeletal: He exhibits tenderness. He exhibits no edema.   LROM LLE due to hip pain   Neurological: He is alert. No cranial nerve deficit.   Skin: Skin is warm, dry and intact. Capillary refill takes less than 2 seconds.   Psychiatric: He has a normal mood and affect. His behavior is normal.       Significant Labs: All pertinent labs within the past 24 hours have been reviewed.    Significant Imaging: I have reviewed and interpreted all pertinent imaging results/findings within the past 24 hours.    Assessment/Plan:      * Left displaced femoral neck fracture    Case discussed with surgery- hip replacement today  Hip Precautions  Pain management       Alzheimer's dementia with behavioral disturbance    Stable. Continue Namenda, Aricept, and Seroquel.  Delirium precautions.       Chronic systolic heart failure/ ICMO EF 40%    Stable.  Continue BB, diuretic, and ARB  Monitor on telemetry  Weigh daily  Last Echo 10/24/18: The estimated LV ejection fraction is 40 %. LV systolic function is mildly to moderately reduced. Diastolic function is indeterminate.       Essential hypertension    Chronic/Controlled. Continue chronic medications,  adjusting as needed.         VTE Risk Mitigation (From admission, onward)        Ordered     IP VTE HIGH RISK PATIENT  Once      10/29/18 1810     Place JUVENCIO hose  Until discontinued      10/29/18 1810              Barbie Montes De Oca MD  Department of Hospital Medicine   [facility]

## 2018-10-29 NOTE — PLAN OF CARE
10/28/18 2040   Patient Assessment/Suction   Level of Consciousness (AVPU) alert   Respiratory Effort Normal;Unlabored   Expansion/Accessory Muscles/Retractions expansion symmetric;no retractions;no use of accessory muscles   All Lung Fields Breath Sounds diminished   PRE-TX-O2-ETCO2   O2 Device (Oxygen Therapy) nasal cannula   Flow (L/min) 3   Oxygen Concentration (%) 32   SpO2 (!) 94 %   Pulse Oximetry Type Intermittent   Pulse 75   Resp 18   Aerosol Therapy   $ Aerosol Therapy Charges PRN treatment not required   Respiratory Treatment Status PRN treatment not required   Ready to Wean/Extubation Screen   FIO2<=50 (chart decimal) 0.32

## 2018-10-29 NOTE — PROGRESS NOTES
Warm blanket provided.  Son at bedside.  Updated on status of 2 cases ahead of patient in preop still.

## 2018-10-29 NOTE — OP NOTE
DATE OF PROCEDURE:  10/29/2018.    ATTENDING PHYSICIAN:  Brando Neves M.D.    FIRST ASSISTANT:  Lei Denney.    PREOPERATIVE DIAGNOSIS:  Femoral neck fracture, displaced, left.    POSTOPERATIVE DIAGNOSIS:  Femoral neck fracture, displaced, left.    PROCEDURE PERFORMED:  Hemiarthroplasty, left hip.    ESTIMATED BLOOD LOSS:  500 mL.    IV FLUID:  Crystalloid.    ANESTHESIA:  General anesthesia.    PROCEDURE IN DETAIL:  The patient was placed on the operating table in the   lateral decubitus position.  He was prepped and draped in the usual sterile   manner for surgery.  A posterolateral approach to the hip was undertaken and   carried down sharply through the skin.  The deep fascia was divided in line with   its fibers.  The sciatic nerve was visualized and protected.  Charnley   retractors were placed.  The short external rotators were taken down and the   capsule was split.  There was a very large fracture hematoma that was evacuated.    The femoral neck was freshened and the head was removed.  It measured to 44   mm.  We now used a cookie cutter and then a canal finder and then a lateralizer   and then we reamed up to a 6.  We now broached to a 6 and got excellent fit and   fill, very good fit.  We trialled, with the +5, it trialled perfectly.  It was   very stable, had minimal shuck.  Leg lengths appeared to be symmetric.  We   dislocated the construct and pulse and irrigated.  The acetabulum was noted to   be free of any debris.  It was also noted to be free of any arthritis.  We   tapped our actual components into position.  The construct trialled perfectly.    We closed the capsule with #2 FiberWire.  We closed the piriformis back with #2   FiberWire.  We removed the Charnley retractors.  The sciatic nerve was in good   position and condition.  We closed the deep fascia with a running #1 Vicryl,   closed the subQ with 2-0 Vicryl and the skin with PDS.  Sterile dressings were   applied and the patient was noted  to be in stable condition.      SF/IN  dd: 10/29/2018 16:07:43 (CDT)  td: 10/29/2018 16:29:31 (CDT)  Doc ID   #[id number]  Job ID #[id number]    CC:

## 2018-10-30 LAB
ANION GAP SERPL CALC-SCNC: 7 MMOL/L
BASOPHILS # BLD AUTO: 0 K/UL
BASOPHILS NFR BLD: 0.6 %
BUN SERPL-MCNC: 16 MG/DL
CALCIUM SERPL-MCNC: 8.1 MG/DL
CHLORIDE SERPL-SCNC: 103 MMOL/L
CO2 SERPL-SCNC: 24 MMOL/L
CREAT SERPL-MCNC: 0.7 MG/DL
DIFFERENTIAL METHOD: ABNORMAL
EOSINOPHIL # BLD AUTO: 0.3 K/UL
EOSINOPHIL NFR BLD: 4.1 %
ERYTHROCYTE [DISTWIDTH] IN BLOOD BY AUTOMATED COUNT: 12.3 %
EST. GFR  (AFRICAN AMERICAN): >60 ML/MIN/1.73 M^2
EST. GFR  (NON AFRICAN AMERICAN): >60 ML/MIN/1.73 M^2
GLUCOSE SERPL-MCNC: 152 MG/DL
HCT VFR BLD AUTO: 32.2 %
HGB BLD-MCNC: 11 G/DL
LYMPHOCYTES # BLD AUTO: 0.5 K/UL
LYMPHOCYTES NFR BLD: 7.3 %
MAGNESIUM SERPL-MCNC: 1.8 MG/DL
MCH RBC QN AUTO: 30.7 PG
MCHC RBC AUTO-ENTMCNC: 34.2 G/DL
MCV RBC AUTO: 90 FL
MONOCYTES # BLD AUTO: 0.7 K/UL
MONOCYTES NFR BLD: 10.6 %
NEUTROPHILS # BLD AUTO: 5.2 K/UL
NEUTROPHILS NFR BLD: 77.4 %
PLATELET # BLD AUTO: 137 K/UL
PMV BLD AUTO: 7.7 FL
POTASSIUM SERPL-SCNC: 3.6 MMOL/L
RBC # BLD AUTO: 3.59 M/UL
SODIUM SERPL-SCNC: 134 MMOL/L
WBC # BLD AUTO: 6.7 K/UL

## 2018-10-30 PROCEDURE — 36415 COLL VENOUS BLD VENIPUNCTURE: CPT

## 2018-10-30 PROCEDURE — 25000003 PHARM REV CODE 250: Performed by: HOSPITALIST

## 2018-10-30 PROCEDURE — 94761 N-INVAS EAR/PLS OXIMETRY MLT: CPT

## 2018-10-30 PROCEDURE — G8988 SELF CARE GOAL STATUS: HCPCS | Mod: CK

## 2018-10-30 PROCEDURE — 93005 ELECTROCARDIOGRAM TRACING: CPT

## 2018-10-30 PROCEDURE — 63600175 PHARM REV CODE 636 W HCPCS: Performed by: HOSPITALIST

## 2018-10-30 PROCEDURE — 12000002 HC ACUTE/MED SURGE SEMI-PRIVATE ROOM

## 2018-10-30 PROCEDURE — G8979 MOBILITY GOAL STATUS: HCPCS | Mod: CK

## 2018-10-30 PROCEDURE — 80048 BASIC METABOLIC PNL TOTAL CA: CPT

## 2018-10-30 PROCEDURE — 25000003 PHARM REV CODE 250: Performed by: NURSE PRACTITIONER

## 2018-10-30 PROCEDURE — 97162 PT EVAL MOD COMPLEX 30 MIN: CPT

## 2018-10-30 PROCEDURE — 87040 BLOOD CULTURE FOR BACTERIA: CPT

## 2018-10-30 PROCEDURE — 97166 OT EVAL MOD COMPLEX 45 MIN: CPT

## 2018-10-30 PROCEDURE — 93010 ELECTROCARDIOGRAM REPORT: CPT | Mod: ,,, | Performed by: INTERNAL MEDICINE

## 2018-10-30 PROCEDURE — 27000221 HC OXYGEN, UP TO 24 HOURS

## 2018-10-30 PROCEDURE — 83735 ASSAY OF MAGNESIUM: CPT

## 2018-10-30 PROCEDURE — G8987 SELF CARE CURRENT STATUS: HCPCS | Mod: CK

## 2018-10-30 PROCEDURE — 25000003 PHARM REV CODE 250: Performed by: ORTHOPAEDIC SURGERY

## 2018-10-30 PROCEDURE — G8978 MOBILITY CURRENT STATUS: HCPCS | Mod: CL

## 2018-10-30 PROCEDURE — 85025 COMPLETE CBC W/AUTO DIFF WBC: CPT

## 2018-10-30 PROCEDURE — 97116 GAIT TRAINING THERAPY: CPT

## 2018-10-30 PROCEDURE — 97535 SELF CARE MNGMENT TRAINING: CPT

## 2018-10-30 RX ORDER — ACETAMINOPHEN 325 MG/1
650 TABLET ORAL EVERY 6 HOURS PRN
Status: DISCONTINUED | OUTPATIENT
Start: 2018-10-30 | End: 2018-10-31 | Stop reason: HOSPADM

## 2018-10-30 RX ORDER — ACETAMINOPHEN 10 MG/ML
1000 INJECTION, SOLUTION INTRAVENOUS ONCE
Status: COMPLETED | OUTPATIENT
Start: 2018-10-30 | End: 2018-10-30

## 2018-10-30 RX ORDER — RAMELTEON 8 MG/1
8 TABLET ORAL NIGHTLY PRN
Status: DISCONTINUED | OUTPATIENT
Start: 2018-10-30 | End: 2018-10-31 | Stop reason: HOSPADM

## 2018-10-30 RX ORDER — ACETAMINOPHEN 10 MG/ML
1000 INJECTION, SOLUTION INTRAVENOUS EVERY 8 HOURS
Status: DISCONTINUED | OUTPATIENT
Start: 2018-10-30 | End: 2018-10-30

## 2018-10-30 RX ADMIN — QUETIAPINE FUMARATE 25 MG: 25 TABLET ORAL at 08:10

## 2018-10-30 RX ADMIN — AMLODIPINE BESYLATE 5 MG: 5 TABLET ORAL at 08:10

## 2018-10-30 RX ADMIN — ACETAMINOPHEN 1000 MG: 10 INJECTION, SOLUTION INTRAVENOUS at 09:10

## 2018-10-30 RX ADMIN — MEMANTINE HYDROCHLORIDE 10 MG: 5 TABLET ORAL at 12:10

## 2018-10-30 RX ADMIN — HYDROCODONE BITARTRATE AND ACETAMINOPHEN 1 TABLET: 5; 325 TABLET ORAL at 12:10

## 2018-10-30 RX ADMIN — MEMANTINE HYDROCHLORIDE 10 MG: 5 TABLET ORAL at 08:10

## 2018-10-30 RX ADMIN — CARVEDILOL 6.25 MG: 6.25 TABLET, FILM COATED ORAL at 12:10

## 2018-10-30 RX ADMIN — QUETIAPINE FUMARATE 25 MG: 25 TABLET ORAL at 12:10

## 2018-10-30 RX ADMIN — FAMOTIDINE 20 MG: 20 TABLET ORAL at 08:10

## 2018-10-30 RX ADMIN — ASPIRIN 325 MG ORAL TABLET 325 MG: 325 PILL ORAL at 12:10

## 2018-10-30 RX ADMIN — CEFAZOLIN SODIUM 1 G: 1 SOLUTION INTRAVENOUS at 06:10

## 2018-10-30 RX ADMIN — CEFAZOLIN SODIUM 1 G: 1 SOLUTION INTRAVENOUS at 03:10

## 2018-10-30 RX ADMIN — ASPIRIN 325 MG ORAL TABLET 325 MG: 325 PILL ORAL at 08:10

## 2018-10-30 RX ADMIN — DONEPEZIL HYDROCHLORIDE 5 MG: 5 TABLET, FILM COATED ORAL at 08:10

## 2018-10-30 RX ADMIN — FAMOTIDINE 20 MG: 20 TABLET ORAL at 12:10

## 2018-10-30 RX ADMIN — HYDROCODONE BITARTRATE AND ACETAMINOPHEN 1 TABLET: 5; 325 TABLET ORAL at 11:10

## 2018-10-30 RX ADMIN — MUPIROCIN 1 G: 20 OINTMENT TOPICAL at 08:10

## 2018-10-30 NOTE — PROGRESS NOTES
Patient voiding per bedpan, Pt remains free of falls, VS stable, RR even and unlabored, Abd non-distended, BS in all four quadrants, clear speech, patient had much confusion throughout the day with a few short moments of clarity, porr appetite but did eat some, refused meds at times, slept after pain med given in afternoon, dressing changed as ordered to left hip

## 2018-10-30 NOTE — SUBJECTIVE & OBJECTIVE
Interval History: Patient seen and examined. No acute events overnight. Surgery performed yesterday. Worked with therapy today. Patient confused today.    Review of Systems   Unable to perform ROS: Dementia     Objective:     Vital Signs (Most Recent):  Temp: 99.2 °F (37.3 °C) (10/30/18 1022)  Pulse: 76 (10/30/18 0830)  Resp: 18 (10/30/18 0830)  BP: 136/65 (10/30/18 0830)  SpO2: 96 % (10/30/18 0949) Vital Signs (24h Range):  Temp:  [97.5 °F (36.4 °C)-101.1 °F (38.4 °C)] 99.2 °F (37.3 °C)  Pulse:  [62-76] 76  Resp:  [17-18] 18  SpO2:  [91 %-96 %] 96 %  BP: (132-148)/(60-65) 136/65     Weight: 96.6 kg (213 lb)  Body mass index is 29.71 kg/m².    Intake/Output Summary (Last 24 hours) at 10/30/2018 1731  Last data filed at 10/30/2018 0600  Gross per 24 hour   Intake 2142.92 ml   Output 1100 ml   Net 1042.92 ml      Physical Exam   Constitutional: No distress.   HENT:   Very [place]   Neck: Normal range of motion. Neck supple. No JVD present. No tracheal deviation present.   Cardiovascular: Normal rate, regular rhythm, normal heart sounds and intact distal pulses.   No murmur heard.  Pulmonary/Chest: Effort normal and breath sounds normal. No respiratory distress.   Abdominal: Soft. Bowel sounds are normal. He exhibits no distension. There is no tenderness.   Musculoskeletal: He exhibits tenderness (over L hip). He exhibits no edema.   LROM LLE due to hip pain   Neurological: He is alert. No cranial nerve deficit.   Oriented only to self.      Skin: Skin is warm, dry and intact. Capillary refill takes less than 2 seconds.   Bandage over L hip c/d/i, dressing was not taken down   Psychiatric: He has a normal mood and affect. His behavior is normal.       Significant Labs: All pertinent labs within the past 24 hours have been reviewed.    Significant Imaging: I have reviewed and interpreted all pertinent imaging results/findings within the past 24 hours.

## 2018-10-30 NOTE — PLAN OF CARE
Problem: Physical Therapy Goal  Goal: Physical Therapy Goal  Goals to be met by: 2018     Patient will increase functional independence with mobility by performin. Supine to sit with Moderate Assistance  2. Sit to stand transfer with Moderate Assistance  3. Bed to chair transfer with Moderate Assistance using Rolling Walker  4. Gait  x 100 feet with Moderate Assistance using Rolling Walker.   5. Lower extremity exercise program x20 reps per handout, with assistance as needed    Outcome: Ongoing (interventions implemented as appropriate)  PT eval and treat completed. Gait training 10ft with RW WBAT LLE. Assist x2. OOB to chair, pillow in between legs. Daughter and son at bedside

## 2018-10-30 NOTE — PLAN OF CARE
Problem: Occupational Therapy Goal  Goal: Occupational Therapy Goal  Goals to be met by: 11/9/2018     Patient will increase functional independence with ADLs by performing:    LE Dressing with Minimal Assistance and Assistive Devices as needed.  Grooming while standing at sink with Stand-by Assistance.  Toileting from bedside commode with Contact Guard Assistance for hygiene and clothing management.   Supine to sit with Minimal Assistance and use of bedrail as needed.  Toilet transfer to bedside commode with Contact Guard Assistance.  Upper extremity exercise program x10 reps per handout, with assistance as needed.    Outcome: Ongoing (interventions implemented as appropriate)  OT evaluation completed today. Goals & care plan established.    Jony Ash, OT  10/30/2018

## 2018-10-30 NOTE — PT/OT/SLP EVAL
"Occupational Therapy   Evaluation    Name: Adrian Osorio  MRN: [id number]  Admitting Diagnosis:  Left displaced femoral neck fracture 1 Day Post-Op    Recommendations:     Discharge Recommendations: nursing facility, skilled  Discharge Equipment Recommendations:  walker, rolling, shower chair  Barriers to discharge:  Decreased caregiver support    History:     Occupational Profile:  Living Environment: Pt lives with dtr in a [facility].   Previous level of function: Pt was independent with ADLs and mobility.  Equipment Used at Home:  none  Assistance upon Discharge: Pt will receive assistance from family.    Past Medical History:   Diagnosis Date    CHF (congestive heart failure)     Coronary artery disease     Hypertension        Past Surgical History:   Procedure Laterality Date    CARDIAC SURGERY      cabg       Subjective     Chief Complaint: pain in L hip  Patient/Family Comments/goals: "This is just pitiful."    Pain/Comfort:  · Pain Rating 1: (did not rate)  · Location - Side 1: Left  · Location 1: hip  · Pain Addressed 1: Distraction, Reposition  · Pain Rating Post-Intervention 1: (did not rate)    Patients cultural, spiritual, Mosque conflicts given the current situation:      Objective:     Communicated with: nurse Martell prior to session.  Patient found family present and SCD, peripheral IV with AVASYS upon OT entry to room.    General Precautions: Standard, fall   Orthopedic Precautions:LLE weight bearing as tolerated, LLE posterior precautions   Braces: N/A     Occupational Performance:    Bed Mobility:    · Patient completed Scooting/Bridging with maximal assistance  · Patient completed Supine to Sit with maximal assistance  · Patient completed Sit to Supine with maximal assistance    Functional Mobility/Transfers:  · Patient completed Sit <> Stand Transfer with moderate assistance  with  rolling walker   · Functional Mobility: Pt ambulated with min A and RW from EOB to sink and back to EOB. Notable " instability with ambulation, requiring frequent verbal and physical cues to negotiate turns with RW.    Activities of Daily Living:  · Grooming: minimum assistance with standing balance to perform facial hygiene while standing at sink.    Cognitive/Visual Perceptual:  Cognitive/Psychosocial Skills:     -       Oriented to: Person   -       Follows Commands/attention:Follows one-step commands  -       Communication: clear/fluent  -       Safety awareness/insight to disability: impaired   -       Mood/Affect/Coping skills/emotional control: Cooperative  Visual/Perceptual:      -Intact     Physical Exam:  Balance:    -       Standing balance - fair(-)  Postural examination/scapula alignment:    -       Rounded shoulders  -       Forward head  -       Kyphosis  Upper Extremity Range of Motion:     -       Right Upper Extremity: <60* at shoulder; WFL at elbow and wrist   -       Left Upper Extremity: <60* at shoulder; WFL at elbow and wrist   Upper Extremity Strength:    -       Right Upper Extremity: WFL  -       Left Upper Extremity: WFL    Strength:    -       Right Upper Extremity: WFL   -       Left Upper Extremity: WFL   Fine Motor Coordination:    -       Intact  Gross motor coordination:   WFL in BUE except at B shoulders    AMPAC 6 Click ADL:  AMPAC Total Score: 16    Treatment & Education:  OT ed patient on safety with walker use for functional mobility with cues for hand placement & sequencing.   OT educated pt and family on posterior hip precautions with instruction sheet and demonstration provided.    Education:    Patient left HOB elevated with family present and SCDs re-applied    Assessment:     Adrian Osorio is a 85 y.o. male with a medical diagnosis of Left displaced femoral neck fracture.  He presents with the following performance deficits affecting function: weakness, impaired endurance, impaired self care skills, impaired cognition, decreased lower extremity function, decreased upper  "extremity function, decreased coordination, decreased ROM, orthopedic precautions, impaired balance, impaired functional mobilty, gait instability.  Pt required max A with bed mobility. Pt very impulsive requiring frequent verbal and physical cues with ADLs and ambulation. Pt would benefit from SNF placement to increase independence with ADLs.     Rehab Prognosis: Fair; patient would benefit from acute skilled OT services to address these deficits and reach maximum level of function.         Clinical Decision Makin.  OT Mod:  "Pt evaluation falls under moderate complexity for evaluation coding due to identification of 3-5 performance deficits noted as stated above. Eval required Min/Mod assistance to complete on this date and detailed assessment(s) were utilized. Moreover, an expanded review of history and occupational profile obtained with additional review of cognitive, physical and psychosocial hx."     Plan:     Patient to be seen 3 x/week to address the above listed problems via self-care/home management, therapeutic activities, therapeutic exercises  · Plan of Care Expires: 18  · Plan of Care Reviewed with: patient, daughter, son    This Plan of care has been discussed with the patient who was involved in its development and understands and is in agreement with the identified goals and treatment plan    GOALS:   Multidisciplinary Problems     Occupational Therapy Goals        Problem: Occupational Therapy Goal    Goal Priority Disciplines Outcome Interventions   Occupational Therapy Goal     OT, PT/OT Ongoing (interventions implemented as appropriate)    Description:  Goals to be met by: 2018     Patient will increase functional independence with ADLs by performing:    LE Dressing with Minimal Assistance and Assistive Devices as needed.  Grooming while standing at sink with Stand-by Assistance.  Toileting from bedside commode with Contact Guard Assistance for hygiene and clothing management. "   Supine to sit with Minimal Assistance and use of bedrail as needed.  Toilet transfer to bedside commode with Contact Guard Assistance.  Upper extremity exercise program x10 reps per handout, with assistance as needed.                      Time Tracking:     OT Date of Treatment: 10/30/18  OT Start Time: 1405  OT Stop Time: 1424  OT Total Time (min): 19 min    Billable Minutes:Evaluation 9  Self Care/Home Management 10    Jony Ash, OT  10/30/2018

## 2018-10-30 NOTE — SUBJECTIVE & OBJECTIVE
"Principal Problem:Left displaced femoral neck fracture    Principal Orthopedic Problem: S/P L shahrzad- KAYLEE    Interval History: increased dementia post-op    Review of patient's allergies indicates:  No Known Allergies    Current Facility-Administered Medications   Medication    acetaminophen tablet 650 mg    amLODIPine tablet 5 mg    aspirin tablet 325 mg    bisacodyl suppository 10 mg    carvedilol tablet 6.25 mg    ceFAZolin (ANCEF) 1 gram in dextrose 5 % 50 mL IVPB (premix)    dextrose 5 % and 0.45 % NaCl infusion    donepezil tablet 5 mg    electrolyte-S (ISOLYTE)    famotidine tablet 20 mg    HYDROcodone-acetaminophen 5-325 mg per tablet 1 tablet    memantine tablet 10 mg    mupirocin 2 % ointment 1 g    ondansetron disintegrating tablet 8 mg    QUEtiapine tablet 25 mg    ramelteon tablet 8 mg    sodium chloride 0.9% flush 5 mL    valsartan tablet 160 mg     Objective:     Vital Signs (Most Recent):  Temp: 99.2 °F (37.3 °C) (10/30/18 1022)  Pulse: 76 (10/30/18 0830)  Resp: 18 (10/30/18 0830)  BP: 136/65 (10/30/18 0830)  SpO2: (!) 92 % (10/30/18 0830) Vital Signs (24h Range):  Temp:  [97.5 °F (36.4 °C)-101.1 °F (38.4 °C)] 99.2 °F (37.3 °C)  Pulse:  [59-76] 76  Resp:  [14-20] 18  SpO2:  [91 %-98 %] 92 %  BP: (121-148)/(59-67) 136/65     Weight: 96.6 kg (213 lb)  Height: 5' 11" (180.3 cm)  Body mass index is 29.71 kg/m².      Intake/Output Summary (Last 24 hours) at 10/30/2018 1041  Last data filed at 10/30/2018 0600  Gross per 24 hour   Intake 3742.92 ml   Output 1800 ml   Net 1942.92 ml       General    Vitals reviewed.  Constitutional: He appears well-developed and well-nourished.   Pulmonary/Chest: Effort normal.   Abdominal: Soft.   Neurological: He is alert.         Left Hip Exam     Comments:  LLE DNVI. Dressing clean and dry            Significant Labs:   CBC:   Recent Labs   Lab 10/29/18  0415 10/29/18  1651   WBC 10.30 7.70   HGB 13.9* 10.6*   HCT 41.1 31.7*    138*     CMP: "   Recent Labs   Lab 10/29/18  0415      K 3.8      CO2 21*   *   BUN 26*   CREATININE 1.0   CALCIUM 8.9   ANIONGAP 11   EGFRNONAA >60     All pertinent labs within the past 24 hours have been reviewed.    Significant Imaging: X-Ray: I have reviewed all pertinent results/findings and my personal findings are:  Left hip arthroplasty with no immediate complication.

## 2018-10-30 NOTE — PLAN OF CARE
Problem: Patient Care Overview  Goal: Plan of Care Review  Outcome: Ongoing (interventions implemented as appropriate)  Plan of care reviewed with pt,  No evidence of learning. Reeducation needed.  PIV clean dry and intact. IVF infusing per order. IV abx infusing per order. Hourly/Q2 hourly rounds completed throughout shift. Pt denies need for pain medication, applied ice x2. neurovascular checks q 4hrs. Telemetry continues to be monitored, NSR. Comfort level established. Good Pain control with PRN meds.  Berman in place draining without complication all night. D/C berman at 0600 pt due to void by 1200.  Repositioned q 2 as tolerated.  Pt has remained free from fall/injury. No skin breakdown noted. Bed in lowest position, brakes locked, call light within reach, SR^x2 for pt safety. Needs attended to, will continue to monitor. Daughter Madeleine at bedside throughout the night.

## 2018-10-30 NOTE — PROGRESS NOTES
[facility] Hospital Medicine  Progress Note    Patient Name: Adrian Osorio  MRN: [id number]  Patient Class: IP- Inpatient   Admission Date: 10/27/2018  Length of Stay: 2 days  Attending Physician: Barbie Montes De Oca MD  Primary Care Provider: Martín Perales MD        Subjective:     Principal Problem:Left displaced femoral neck fracture    HPI:  Mr. 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 He presents to the ED with c/o left hip pain. It started after having a fall while in his room. While in the ED, he had a left hip Xray which showed a femoral neck fracture. His family is at bedside. He is currently complaining left hip pain and sates that the Morphine did not help.      Hospital Course:  No notes on file    Interval History: Patient seen and examined. No acute events overnight. Surgery performed yesterday. Worked with therapy today. Patient confused today.    Review of Systems   Unable to perform ROS: Dementia     Objective:     Vital Signs (Most Recent):  Temp: 99.2 °F (37.3 °C) (10/30/18 1022)  Pulse: 76 (10/30/18 0830)  Resp: 18 (10/30/18 0830)  BP: 136/65 (10/30/18 0830)  SpO2: 96 % (10/30/18 0949) Vital Signs (24h Range):  Temp:  [97.5 °F (36.4 °C)-101.1 °F (38.4 °C)] 99.2 °F (37.3 °C)  Pulse:  [62-76] 76  Resp:  [17-18] 18  SpO2:  [91 %-96 %] 96 %  BP: (132-148)/(60-65) 136/65     Weight: 96.6 kg (213 lb)  Body mass index is 29.71 kg/m².    Intake/Output Summary (Last 24 hours) at 10/30/2018 1731  Last data filed at 10/30/2018 0600  Gross per 24 hour   Intake 2142.92 ml   Output 1100 ml   Net 1042.92 ml      Physical Exam   Constitutional: No distress.   HENT:   Very [place]   Neck: Normal range of motion. Neck supple. No JVD present. No tracheal deviation present.   Cardiovascular: Normal rate, regular rhythm, normal heart sounds and intact distal pulses.   No murmur heard.  Pulmonary/Chest: Effort normal and breath sounds normal. No  respiratory distress.   Abdominal: Soft. Bowel sounds are normal. He exhibits no distension. There is no tenderness.   Musculoskeletal: He exhibits tenderness (over L hip). He exhibits no edema.   LROM LLE due to hip pain   Neurological: He is alert. No cranial nerve deficit.   Oriented only to self.      Skin: Skin is warm, dry and intact. Capillary refill takes less than 2 seconds.   Bandage over L hip c/d/i, dressing was not taken down   Psychiatric: He has a normal mood and affect. His behavior is normal.       Significant Labs: All pertinent labs within the past 24 hours have been reviewed.    Significant Imaging: I have reviewed and interpreted all pertinent imaging results/findings within the past 24 hours.    Assessment/Plan:      * Left displaced femoral neck fracture    S/p surgery 10/29  Hip Precautions  Pain management  SNH placement  PT/OT consults       Alzheimer's dementia with behavioral disturbance    Stable. Continue Namenda, Aricept, and Seroquel.  Delirium precautions.       Chronic systolic heart failure/ ICMO EF 40%    Stable.  Continue BB, diuretic, and ARB  Monitor on telemetry  Weigh daily  Last Echo 10/24/18: The estimated LV ejection fraction is 40 %. LV systolic function is mildly to moderately reduced. Diastolic function is indeterminate.       Essential hypertension    Chronic/Controlled. Continue chronic medications, adjusting as needed.         VTE Risk Mitigation (From admission, onward)        Ordered     IP VTE HIGH RISK PATIENT  Once      10/29/18 1810     Place JUVENCIO hose  Until discontinued      10/29/18 1810              Barbie Montes De Oca MD  Department of Hospital Medicine   [facility]

## 2018-10-30 NOTE — PT/OT/SLP EVAL
Physical Therapy Evaluation    Patient Name:  Adrian Osorio   MRN:  [id number]    Recommendations:     Discharge Recommendations:  nursing facility, skilled   Discharge Equipment Recommendations: walker, rolling   Barriers to discharge: Decreased caregiver support    Assessment:     Adrian Osorio is a 85 y.o. male admitted with a medical diagnosis of Left displaced femoral neck fracture.  He presents with the following impairments/functional limitations:  weakness, impaired endurance, impaired self care skills, gait instability, impaired functional mobilty, impaired cognition, impaired balance, decreased lower extremity function, pain, decreased safety awareness, orthopedic precautions . Presents with dementia but calm and able to follow directions. Pt tolerated brief gait training this am. Pt requiring 2 people assist for safety and will benefit from SNF.    Rehab Prognosis:  fair; patient would benefit from acute skilled PT services to address these deficits and reach maximum level of function.      Recent Surgery: Procedure(s) (LRB):  ARTHROPLASTY, HIP REPLACEMENT  BELLO HIP (Left) 1 Day Post-Op    Plan:     During this hospitalization, patient to be seen BID to address the above listed problems via gait training, therapeutic activities, therapeutic exercises  · Plan of Care Expires:  11/30/18   Plan of Care Reviewed with: patient, daughter, son    Subjective     Communicated with nurse Martell prior to session.  Patient found supine upon PT entry to room, agreeable to evaluation.    Pt looking for glasses- daughter stated brought it home yesterday  Daughter stated pt is ambulatory and functional at home  Pt c/o LH pain with mobility  Daughter reviewed on hip precautions- will require more instructions  Daughter stated pt was fighting with the abductor pillow last night and does not like it. She stated pt does not normally cross legs- educated  On precautions    Chief Complaint: pain LH  Patient comments/goals:  none stated  Pain/Comfort:  · Pain Rating 1: (unable to state)  · Location - Side 1: Left  · Location 1: hip  · Pain Addressed 1: Reposition, Distraction    Patients cultural, spiritual, Confucianism conflicts given the current situation:      Living Environment:  Home with daughter  Prior to admission, patients level of function was ambulatory with no assistive device.  Patient has the following equipment: none.  DME owned (not currently used): .  Upon discharge, patient will have assistance from family.    Objective:     Patient found with: peripheral IV, SCD, hip abduction pillow, avasys    General Precautions: Standard, fall   Orthopedic Precautions:LLE weight bearing as tolerated, LLE posterior precautions   Braces: N/A     Exams:  · RLE ROM: WFL  · RLE Strength: WFL  · LLE ROM: Deficits: within hip precautions  · LLE Strength: Deficits: 3-/5    Functional Mobility:  · Bed Mobility:     · Scooting: maximal assistance  · Supine to Sit: maximal assistance  · Transfers:     · Sit to Stand:  moderate assistance and of 2 persons with rolling walker  · Bed to Chair: moderate assistance and of 2 persons with  rolling walker  using  Stand Pivot  · Gait: 10ft with RW WBAT LLE  · Balance: fair to poor    AM-PAC 6 CLICK MOBILITY  Total Score:11       Therapeutic Activities and Exercises:   EOB sitting with extra time due to dementia  standing and gait with 2 people assist for safety  OOB to chair with pillow in between legs      Patient left up in chair with all lines intact, call button in reach, nurse bebeto notified and daughter/son and EKG tech  present.    GOALS:   Multidisciplinary Problems     Physical Therapy Goals        Problem: Physical Therapy Goal    Goal Priority Disciplines Outcome Goal Variances Interventions   Physical Therapy Goal     PT, PT/OT Ongoing (interventions implemented as appropriate)     Description:  Goals to be met by: 11-     Patient will increase functional independence with mobility  by performin. Supine to sit with Moderate Assistance  2. Sit to stand transfer with Moderate Assistance  3. Bed to chair transfer with Moderate Assistance using Rolling Walker  4. Gait  x 100 feet with Moderate Assistance using Rolling Walker.   5. Lower extremity exercise program x20 reps per handout, with assistance as needed                      History:     Past Medical History:   Diagnosis Date    CHF (congestive heart failure)     Coronary artery disease     Hypertension        Past Surgical History:   Procedure Laterality Date    CARDIAC SURGERY      cabg       Clinical Decision Making:     History  Co-morbidities and personal factors that may impact the plan of care Examination  Body Structures and Functions, activity limitations and participation restrictions that may impact the plan of care Clinical Presentation   Decision Making/ Complexity Score   Co-morbidities:   [] Time since onset of injury / illness / exacerbation  [] Status of current condition  []Patient's cognitive status and safety concerns    [] Multiple Medical Problems (see med hx)  Personal Factors:   [] Patient's age  [] Prior Level of function   [] Patient's home situation (environment and family support)  [] Patient's level of motivation  [] Expected progression of patient      HISTORY:(criteria)    [] 83375 - no personal factors/history    [] 02945 - has 1-2 personal factor/comorbidity     [] 57887 - has >3 personal factor/comorbidity     Body Regions:  [] Objective examination findings  [] Head     []  Neck  [] Trunk   [] Upper Extremity  [] Lower Extremity    Body Systems:  [] For communication ability, affect, cognition, language, and learning style: the assessment of the ability to make needs known, consciousness, orientation (person, place, and time), expected emotional /behavioral responses, and learning preferences (eg, learning barriers, education  needs)  [] For the neuromuscular system: a general assessment of gross  coordinated movement (eg, balance, gait, locomotion, transfers, and transitions) and motor function  (motor control and motor learning)  [] For the musculoskeletal system: the assessment of gross symmetry, gross range of motion, gross strength, height, and weight  [] For the integumentary system: the assessment of pliability(texture), presence of scar formation, skin color, and skin integrity  [] For cardiovascular/pulmonary system: the assessment of heart rate, respiratory rate, blood pressure, and edema     Activity limitations:    [] Patient's cognitive status and saf ety concerns          [] Status of current condition      [] Weight bearing restriction  [] Cardiopulmunary Restriction    Participation Restrictions:   [] Goals and goal agreement with the patient     [] Rehab potential (prognosis) and probable outcome      Examination of Body System: (criteria)    [] 91715 - addressing 1-2 elements    [] 64863 - addressing a total of 3 or more elements     [] 40103 -  Addressing a total of 4 or more elements         Clinical Presentation: (criteria)  Choose one     On examination of body system using standardized tests and measures patient presents with (CHOOSE ONE) elements from any of the following: body structures and functions, activity limitations, and/or participation restrictions.  Leading to a clinical presentation that is considered (CHOOSE ONE)                              Clinical Decision Making  (Eval Complexity):  Choose One     Time Tracking:     PT Received On: 10/30/18  PT Start Time: 1143     PT Stop Time: 1200  PT Total Time (min): 17 min     Billable Minutes: Evaluation 8 and Gait Training 9      Cheri Hermosillo, PT  10/30/2018

## 2018-10-30 NOTE — NURSING
Left a voicemail for Dr. Montes De Oca for SUMIT Martell to report patient's axillary temperature at 101.1

## 2018-10-30 NOTE — PROGRESS NOTES
"[facility]  Orthopedics  Progress Note    Patient Name: Adrian Osorio  MRN: [id number]  Admission Date: 10/27/2018  Hospital Length of Stay: 2 days  Attending Provider: Barbie Montes De Oca MD  Primary Care Provider: Martín Perales MD  Follow-up For: Procedure(s) (LRB):  ARTHROPLASTY, HIP REPLACEMENT  BELLO HIP (Left)    Post-Operative Day: 1 Day Post-Op  Subjective:     Principal Problem:Left displaced femoral neck fracture    Principal Orthopedic Problem: S/P L bello- KAYLEE    Interval History: increased dementia post-op    Review of patient's allergies indicates:  No Known Allergies    Current Facility-Administered Medications   Medication    acetaminophen tablet 650 mg    amLODIPine tablet 5 mg    aspirin tablet 325 mg    bisacodyl suppository 10 mg    carvedilol tablet 6.25 mg    ceFAZolin (ANCEF) 1 gram in dextrose 5 % 50 mL IVPB (premix)    dextrose 5 % and 0.45 % NaCl infusion    donepezil tablet 5 mg    electrolyte-S (ISOLYTE)    famotidine tablet 20 mg    HYDROcodone-acetaminophen 5-325 mg per tablet 1 tablet    memantine tablet 10 mg    mupirocin 2 % ointment 1 g    ondansetron disintegrating tablet 8 mg    QUEtiapine tablet 25 mg    ramelteon tablet 8 mg    sodium chloride 0.9% flush 5 mL    valsartan tablet 160 mg     Objective:     Vital Signs (Most Recent):  Temp: 99.2 °F (37.3 °C) (10/30/18 1022)  Pulse: 76 (10/30/18 0830)  Resp: 18 (10/30/18 0830)  BP: 136/65 (10/30/18 0830)  SpO2: (!) 92 % (10/30/18 0830) Vital Signs (24h Range):  Temp:  [97.5 °F (36.4 °C)-101.1 °F (38.4 °C)] 99.2 °F (37.3 °C)  Pulse:  [59-76] 76  Resp:  [14-20] 18  SpO2:  [91 %-98 %] 92 %  BP: (121-148)/(59-67) 136/65     Weight: 96.6 kg (213 lb)  Height: 5' 11" (180.3 cm)  Body mass index is 29.71 kg/m².      Intake/Output Summary (Last 24 hours) at 10/30/2018 1041  Last data filed at 10/30/2018 0600  Gross per 24 hour   Intake 3742.92 ml   Output 1800 ml   Net 1942.92 ml "       General    Vitals reviewed.  Constitutional: He appears well-developed and well-nourished.   Pulmonary/Chest: Effort normal.   Abdominal: Soft.   Neurological: He is alert.         Left Hip Exam     Comments:  LLE DNVI. Dressing clean and dry            Significant Labs:   CBC:   Recent Labs   Lab 10/29/18  0415 10/29/18  1651   WBC 10.30 7.70   HGB 13.9* 10.6*   HCT 41.1 31.7*    138*     CMP:   Recent Labs   Lab 10/29/18  0415      K 3.8      CO2 21*   *   BUN 26*   CREATININE 1.0   CALCIUM 8.9   ANIONGAP 11   EGFRNONAA >60     All pertinent labs within the past 24 hours have been reviewed.    Significant Imaging: X-Ray: I have reviewed all pertinent results/findings and my personal findings are:  Left hip arthroplasty with no immediate complication.    Assessment/Plan:     * Left displaced femoral neck fracture    Change dressing today 1500  SNF has been consulted  LLE MERCEDES WOODRUFF  Orthopedics  [facility]

## 2018-10-31 VITALS
SYSTOLIC BLOOD PRESSURE: 107 MMHG | WEIGHT: 213 LBS | OXYGEN SATURATION: 91 % | DIASTOLIC BLOOD PRESSURE: 50 MMHG | HEIGHT: 71 IN | RESPIRATION RATE: 18 BRPM | HEART RATE: 73 BPM | BODY MASS INDEX: 29.82 KG/M2 | TEMPERATURE: 99 F

## 2018-10-31 LAB
ANION GAP SERPL CALC-SCNC: 7 MMOL/L
BASOPHILS # BLD AUTO: 0 K/UL
BASOPHILS NFR BLD: 0.2 %
BUN SERPL-MCNC: 18 MG/DL
CALCIUM SERPL-MCNC: 8 MG/DL
CHLORIDE SERPL-SCNC: 103 MMOL/L
CO2 SERPL-SCNC: 23 MMOL/L
CREAT SERPL-MCNC: 0.8 MG/DL
DIFFERENTIAL METHOD: ABNORMAL
EOSINOPHIL # BLD AUTO: 0.6 K/UL
EOSINOPHIL NFR BLD: 9.2 %
ERYTHROCYTE [DISTWIDTH] IN BLOOD BY AUTOMATED COUNT: 12.9 %
EST. GFR  (AFRICAN AMERICAN): >60 ML/MIN/1.73 M^2
EST. GFR  (NON AFRICAN AMERICAN): >60 ML/MIN/1.73 M^2
GLUCOSE SERPL-MCNC: 116 MG/DL
HCT VFR BLD AUTO: 31.4 %
HGB BLD-MCNC: 10.8 G/DL
LYMPHOCYTES # BLD AUTO: 1 K/UL
LYMPHOCYTES NFR BLD: 14.8 %
MAGNESIUM SERPL-MCNC: 2 MG/DL
MCH RBC QN AUTO: 31.3 PG
MCHC RBC AUTO-ENTMCNC: 34.3 G/DL
MCV RBC AUTO: 91 FL
MONOCYTES # BLD AUTO: 0.7 K/UL
MONOCYTES NFR BLD: 9.9 %
NEUTROPHILS # BLD AUTO: 4.6 K/UL
NEUTROPHILS NFR BLD: 65.9 %
PLATELET # BLD AUTO: 139 K/UL
PMV BLD AUTO: 7.9 FL
POTASSIUM SERPL-SCNC: 3.3 MMOL/L
RBC # BLD AUTO: 3.44 M/UL
SODIUM SERPL-SCNC: 133 MMOL/L
WBC # BLD AUTO: 7 K/UL

## 2018-10-31 PROCEDURE — 97530 THERAPEUTIC ACTIVITIES: CPT

## 2018-10-31 PROCEDURE — 86580 TB INTRADERMAL TEST: CPT | Performed by: HOSPITALIST

## 2018-10-31 PROCEDURE — 83735 ASSAY OF MAGNESIUM: CPT

## 2018-10-31 PROCEDURE — 97116 GAIT TRAINING THERAPY: CPT

## 2018-10-31 PROCEDURE — 94760 N-INVAS EAR/PLS OXIMETRY 1: CPT

## 2018-10-31 PROCEDURE — 85025 COMPLETE CBC W/AUTO DIFF WBC: CPT

## 2018-10-31 PROCEDURE — 97110 THERAPEUTIC EXERCISES: CPT

## 2018-10-31 PROCEDURE — 25000003 PHARM REV CODE 250: Performed by: NURSE PRACTITIONER

## 2018-10-31 PROCEDURE — 36415 COLL VENOUS BLD VENIPUNCTURE: CPT

## 2018-10-31 PROCEDURE — S5010 5% DEXTROSE AND 0.45% SALINE: HCPCS | Performed by: ORTHOPAEDIC SURGERY

## 2018-10-31 PROCEDURE — 25000003 PHARM REV CODE 250: Performed by: ORTHOPAEDIC SURGERY

## 2018-10-31 PROCEDURE — 25000003 PHARM REV CODE 250: Performed by: HOSPITALIST

## 2018-10-31 PROCEDURE — 80048 BASIC METABOLIC PNL TOTAL CA: CPT

## 2018-10-31 PROCEDURE — 63600175 PHARM REV CODE 636 W HCPCS: Performed by: HOSPITALIST

## 2018-10-31 PROCEDURE — 94761 N-INVAS EAR/PLS OXIMETRY MLT: CPT

## 2018-10-31 PROCEDURE — 27000221 HC OXYGEN, UP TO 24 HOURS

## 2018-10-31 RX ORDER — ASPIRIN 325 MG
325 TABLET ORAL 2 TIMES DAILY
Refills: 0 | Status: ON HOLD | COMMUNITY
Start: 2018-10-31 | End: 2019-01-02 | Stop reason: HOSPADM

## 2018-10-31 RX ORDER — HYDROCODONE BITARTRATE AND ACETAMINOPHEN 5; 325 MG/1; MG/1
1 TABLET ORAL EVERY 4 HOURS PRN
Qty: 20 TABLET | Refills: 0 | Status: SHIPPED | OUTPATIENT
Start: 2018-10-31 | End: 2018-12-20 | Stop reason: CLARIF

## 2018-10-31 RX ORDER — POTASSIUM CHLORIDE 20 MEQ/15ML
60 SOLUTION ORAL
Status: DISCONTINUED | OUTPATIENT
Start: 2018-10-31 | End: 2018-10-31 | Stop reason: HOSPADM

## 2018-10-31 RX ORDER — LANOLIN ALCOHOL/MO/W.PET/CERES
800 CREAM (GRAM) TOPICAL
Status: DISCONTINUED | OUTPATIENT
Start: 2018-10-31 | End: 2018-10-31 | Stop reason: HOSPADM

## 2018-10-31 RX ORDER — BISACODYL 10 MG
10 SUPPOSITORY, RECTAL RECTAL DAILY
Refills: 0 | COMMUNITY
Start: 2018-11-01 | End: 2018-11-04

## 2018-10-31 RX ORDER — SODIUM,POTASSIUM PHOSPHATES 280-250MG
2 POWDER IN PACKET (EA) ORAL
Status: DISCONTINUED | OUTPATIENT
Start: 2018-10-31 | End: 2018-10-31 | Stop reason: HOSPADM

## 2018-10-31 RX ORDER — POTASSIUM CHLORIDE 20 MEQ/15ML
40 SOLUTION ORAL
Status: DISCONTINUED | OUTPATIENT
Start: 2018-10-31 | End: 2018-10-31 | Stop reason: HOSPADM

## 2018-10-31 RX ADMIN — FAMOTIDINE 20 MG: 20 TABLET ORAL at 09:10

## 2018-10-31 RX ADMIN — BISACODYL 10 MG: 10 SUPPOSITORY RECTAL at 09:10

## 2018-10-31 RX ADMIN — VALSARTAN 160 MG: 80 TABLET, FILM COATED ORAL at 09:10

## 2018-10-31 RX ADMIN — Medication 5 UNITS: at 09:10

## 2018-10-31 RX ADMIN — HYDROCODONE BITARTRATE AND ACETAMINOPHEN 1 TABLET: 5; 325 TABLET ORAL at 01:10

## 2018-10-31 RX ADMIN — POTASSIUM CHLORIDE 40 MEQ: 20 SOLUTION ORAL at 12:10

## 2018-10-31 RX ADMIN — HYDROCODONE BITARTRATE AND ACETAMINOPHEN 1 TABLET: 5; 325 TABLET ORAL at 09:10

## 2018-10-31 RX ADMIN — ASPIRIN 325 MG ORAL TABLET 325 MG: 325 PILL ORAL at 09:10

## 2018-10-31 RX ADMIN — MEMANTINE HYDROCHLORIDE 10 MG: 5 TABLET ORAL at 09:10

## 2018-10-31 RX ADMIN — MUPIROCIN 1 G: 20 OINTMENT TOPICAL at 09:10

## 2018-10-31 RX ADMIN — DEXTROSE AND SODIUM CHLORIDE: 5; .45 INJECTION, SOLUTION INTRAVENOUS at 04:10

## 2018-10-31 RX ADMIN — QUETIAPINE FUMARATE 25 MG: 25 TABLET ORAL at 09:10

## 2018-10-31 RX ADMIN — AMLODIPINE BESYLATE 5 MG: 5 TABLET ORAL at 09:10

## 2018-10-31 RX ADMIN — CARVEDILOL 6.25 MG: 6.25 TABLET, FILM COATED ORAL at 09:10

## 2018-10-31 RX ADMIN — POTASSIUM CHLORIDE 40 MEQ: 20 SOLUTION ORAL at 01:10

## 2018-10-31 NOTE — PLAN OF CARE
Problem: Patient Care Overview  Goal: Plan of Care Review  Dsg. To L hip D/I. Daughter now at bedside. Repositioned with assistance. Vital signs monitored. No BM this shift after several attempts on fracture pan,.Will continue to monitor.

## 2018-10-31 NOTE — PHYSICIAN QUERY
"PT Name: Adrian Osorio  MR #: [id number]    Physician Query Form - Hematology Clarification      CDS/: Renee Cardenas               Contact information: [phone]    This form is a permanent document in the medical record.      Query Date: October 31, 2018    By submitting this query, we are merely seeking further clarification of documentation. Please utilize your independent clinical judgment when addressing the question(s) below.    The Medical record contains the following:   Indicators  Supporting Clinical Findings Location in Medical Record    "Anemia" documented      x H & H =  13.9 & 41.1---->10.6 & 31.7   11.0 & 32.2----->10.8 & 31.4  Lab Results 10/29, 10/30 & 10/31/2018     BP =                     HR=      "GI bleeding" documented      Acute bleeding (Non GI site)      Transfusion(s)      x Treatment:  CBC auto differential Daily  MD Orders 10/30/2018    x Other:   PROCEDURE PERFORMED:  Hemiarthroplasty, left hip.    ESTIMATED BLOOD LOSS:  500 mL.    Op Note 10/29/2018     Provider, please specify diagnosis or diagnoses associated with above clinical findings.    x Acute blood loss anemia expected post-operatively    Acute blood loss anemia      Other Hematological Diagnosis (please specify):      Clinically Undetermined       Please document in your progress notes daily for the duration of treatment, until resolved, and include in your discharge summary.                                                                                                      "

## 2018-10-31 NOTE — PLAN OF CARE
Cm spoke with Barbie at [facility], they do not take [employer].  After speaking with daughter Madeleine, she chose [facility].  Cm sent the packet to [facility] via [facility]: H&P, LABS, MAR, CONSULT, Progress notes, PT/OT Eval.  Cm will follow-up with Lynette.    10:02am  Cm received a call from Bryan Osorio (son) informing me that he has POA and wish to be call for all decisions.  Bryan can be reached at [phone].  Cm updated Bryan on the discussion I had with his sister.  Informing him that [facility] does not accept [employer].  Cm has given him a list of the facilities that accept [employer]. His sister Madeleine had given him the information, but he wanted to clarify it with me.  Bryan is in agreement with [facility] for his father.  10:45  Cm spoke with Shone at [facility], they will review information and get back with me.  11:30am  Cm spoke with Hoa she need orders clarified.  Cm contacted Dr. Montes De Oca Pt will not be on IVAB at discharge.  11:45am  Pt is accepted at [facility], pending [employer] authorization.  Cm notified Dr. Montes De Oca, S/S sheet and script completed.  Cm will continue to follow-up.    12:08pm  Cm sent the S/S and script via [facility] to [facility] via [facility].      10/31/18 1003   Discharge Reassessment   Assessment Type Discharge Planning Reassessment   Provided patient/caregiver education on the expected discharge date and the discharge plan Yes   Do you have any problems affording any of your prescribed medications? No   Discharge Plan A Skilled Nursing Facility   Patient choice form signed by patient/caregiver Yes

## 2018-10-31 NOTE — PLAN OF CARE
Problem: Physical Therapy Goal  Goal: Physical Therapy Goal  Goals to be met by: 2018     Patient will increase functional independence with mobility by performin. Supine to sit with Moderate Assistance  2. Sit to stand transfer with Moderate Assistance  3. Bed to chair transfer with Moderate Assistance using Rolling Walker  4. Gait  x 100 feet with Moderate Assistance using Rolling Walker.   5. Lower extremity exercise program x20 reps per handout, with assistance as needed     Outcome: Ongoing (interventions implemented as appropriate)  Patient participated in gait training, therapeutic exercise and activities to improve functional mobility, improve ADL's and promote safe ambulation to decrease fall risk.

## 2018-10-31 NOTE — PT/OT/SLP PROGRESS
Physical Therapy Treatment    Patient Name:  Adrian Osorio   MRN:  [id number]    Recommendations:     Discharge Recommendations:  nursing facility, skilled   Discharge Equipment Recommendations:     Barriers to discharge: None    Assessment:     Adrian Osorio is a 85 y.o. male admitted with a medical diagnosis of Left displaced femoral neck fracture.  He presents with the following impairments/functional limitations:  weakness, gait instability, decreased upper extremity function, decreased ROM, impaired coordination, decreased lower extremity function, impaired balance, impaired endurance, decreased safety awareness, orthopedic precautions, pain, impaired cognition, impaired self care skills, impaired functional mobilty, decreased coordination . Patient did better with gait training this afternoon making 35' but with less stops and decreased c/o pain. He still had poor control of RW and could not pull it off the wall when making a turn to sit down. He may do better as he becomes stronger and pain lessens.    Rehab Prognosis:  fair; patient would benefit from acute skilled PT services to address these deficits and reach maximum level of function.      Recent Surgery: Procedure(s) (LRB):  HEMIARTHROPLASTY, HIP (Left) 2 Days Post-Op    Plan:     During this hospitalization, patient to be seen BID to address the above listed problems via gait training, therapeutic activities, therapeutic exercises  · Plan of Care Expires:  11/30/18   Plan of Care Reviewed with: patient, daughter    Subjective     Communicated with nurse Martell prior to session.  Patient found up in recliner upon PT entry to room, agreeable to treatment.      Chief Complaint: ready to go back to bed  Patient comments/goals: no able to communicate - has some dementia   Pain/Comfort:  · Pain Rating 1: 0/10  · Location - Side 1: Left  · Location 1: hip  · Pain Addressed 1: Pre-medicate for activity  · Pain Rating Post-Intervention 1: 0/10    Patients  cultural, spiritual, Moravian conflicts given the current situation:      Objective:     Patient found with: SCD, peripheral IV     General Precautions: Standard, fall   Orthopedic Precautions:LLE weight bearing as tolerated, LLE posterior precautions   Braces: N/A     Functional Mobility:  · Bed Mobility:     · Scooting: moderate assistance and of 2 persons  · Bridging: moderate assistance  · Sit to Supine: moderate assistance and of 2 persons  · Transfers:     · Chair to bed: moderate assistance and of 2 persons with  rolling walker  using  Stand Pivot  · Gait: 35' with RW and CGA to mod A for RW control      AM-PAC 6 CLICK MOBILITY          Therapeutic Activities and Exercises:   Sit to stand from recliner and EOB, Scooting up in bed with SL bridging using RLE, Training for use of RW hand placement for safety.    Patient left HOB elevated with all lines intact, call button in reach and nurse Jasbir notified..    GOALS:   Multidisciplinary Problems     Physical Therapy Goals        Problem: Physical Therapy Goal    Goal Priority Disciplines Outcome Goal Variances Interventions   Physical Therapy Goal     PT, PT/OT Ongoing (interventions implemented as appropriate)     Description:  Goals to be met by: 2018     Patient will increase functional independence with mobility by performin. Supine to sit with Moderate Assistance  2. Sit to stand transfer with Moderate Assistance  3. Bed to chair transfer with Moderate Assistance using Rolling Walker  4. Gait  x 100 feet with Moderate Assistance using Rolling Walker.   5. Lower extremity exercise program x20 reps per handout, with assistance as needed                      Time Tracking:     PT Received On: 10/31/18  PT Start Time: 1429     PT Stop Time: 1452  PT Total Time (min): 23 min     Billable Minutes: Gait Training 15 and Therapeutic Activity 08    Treatment Type: Treatment  PT/PTA: PTA     PTA Visit Number: 2     Madeleine Garrett PTA  10/31/2018

## 2018-10-31 NOTE — PROGRESS NOTES
"[facility]  Orthopedics  Progress Note    Patient Name: Adrian Osorio  MRN: [id number]  Admission Date: 10/27/2018  Hospital Length of Stay: 3 days  Attending Provider: Barbie Montes De Oca MD  Primary Care Provider: Martín Perales MD  Follow-up For: Procedure(s) (LRB):  HEMIARTHROPLASTY, HIP (Left)    Post-Operative Day: 2 Days Post-Op  Subjective:     Principal Problem:Left displaced femoral neck fracture    Principal Orthopedic Problem: S/P L shahrzad- KAYLEE    Interval History: increased dementia post-op    Review of patient's allergies indicates:  No Known Allergies    Current Facility-Administered Medications   Medication    acetaminophen tablet 650 mg    amLODIPine tablet 5 mg    aspirin tablet 325 mg    bisacodyl suppository 10 mg    carvedilol tablet 6.25 mg    dextrose 5 % and 0.45 % NaCl infusion    donepezil tablet 5 mg    electrolyte-S (ISOLYTE)    famotidine tablet 20 mg    HYDROcodone-acetaminophen 5-325 mg per tablet 1 tablet    memantine tablet 10 mg    mupirocin 2 % ointment 1 g    ondansetron disintegrating tablet 8 mg    QUEtiapine tablet 25 mg    ramelteon tablet 8 mg    sodium chloride 0.9% flush 5 mL    valsartan tablet 160 mg     Objective:     Vital Signs (Most Recent):  Temp: 97 °F (36.1 °C) (10/31/18 0718)  Pulse: 66 (10/31/18 0718)  Resp: 16 (10/31/18 0718)  BP: (!) 184/80 (10/31/18 0718)  SpO2: 95 % (10/31/18 0718) Vital Signs (24h Range):  Temp:  [97 °F (36.1 °C)-101.1 °F (38.4 °C)] 97 °F (36.1 °C)  Pulse:  [66-78] 66  Resp:  [16-18] 16  SpO2:  [91 %-96 %] 95 %  BP: (136-184)/(64-80) 184/80     Weight: 96.6 kg (213 lb)  Height: 5' 11" (180.3 cm)  Body mass index is 29.71 kg/m².      Intake/Output Summary (Last 24 hours) at 10/31/2018 0764  Last data filed at 10/31/2018 0537  Gross per 24 hour   Intake 2011.25 ml   Output --   Net 2011.25 ml       General    Vitals reviewed.  Constitutional: He appears well-developed and well-nourished. "   Pulmonary/Chest: Effort normal.   Abdominal: Soft.   Neurological: He is alert.         Left Hip Exam     Comments:  LLE DNVI. Dressing clean and dry            Significant Labs:   CBC:   Recent Labs   Lab 10/29/18  1651 10/30/18  1105 10/31/18  0507   WBC 7.70 6.70 7.00   HGB 10.6* 11.0* 10.8*   HCT 31.7* 32.2* 31.4*   * 137* 139*     CMP:   Recent Labs   Lab 10/30/18  1105 10/31/18  0507   * 133*   K 3.6 3.3*    103   CO2 24 23   * 116*   BUN 16 18   CREATININE 0.7 0.8   CALCIUM 8.1* 8.0*   ANIONGAP 7* 7*   EGFRNONAA >60 >60     All pertinent labs within the past 24 hours have been reviewed.    Significant Imaging: X-Ray: I have reviewed all pertinent results/findings and my personal findings are:  Left hip arthroplasty with no immediate complication.    Assessment/Plan:     * Left displaced femoral neck fracture    SNF has been consulted  LLE WBAT  Hip precautions  Stable from an ortho stand point             MERCEDES GUEVARA  Orthopedics  [facility]

## 2018-10-31 NOTE — DISCHARGE INSTRUCTIONS
[facility] Facility Transfer Orders        Admit to: [facility]    Diagnoses:   Active Hospital Problems    Diagnosis  POA    *Left displaced femoral neck fracture [S72.002A]  Yes     Priority: 1 - High    Essential hypertension [I10]  Yes    Chronic systolic heart failure/ ICMO EF 40% [I50.22]  Yes    Alzheimer's dementia with behavioral disturbance [G30.9, F02.81]  Yes      Resolved Hospital Problems   No resolved problems to display.     Allergies: Review of patient's allergies indicates:  No Known Allergies    Code Status: Full    Vitals: Routine       Diet: cardiac diet    Activity: Activity as tolerated, Weight bearing as tolerated. Hip precautions.    Nursing Precautions: Fall    Consults: PT to evaluate and treat- 5 times a week and OT to evaluate and treat- 5 times a week     IV Access: Peripheral     Medications: Discontinue all previous medication orders, if any. See new list below.  Current Discharge Medication List      START taking these medications    Details   aspirin 325 MG tablet Take 1 tablet (325 mg total) by mouth 2 (two) times daily. for 21 days  Refills: 0      bisacodyl (DULCOLAX) 10 mg Supp Place 1 suppository (10 mg total) rectally once daily. for 3 days  Refills: 0      HYDROcodone-acetaminophen (NORCO) 5-325 mg per tablet Take 1 tablet by mouth every 4 (four) hours as needed.  Qty: 20 tablet, Refills: 0         CONTINUE these medications which have NOT CHANGED    Details   amLODIPine (NORVASC) 5 MG tablet Take 5 mg by mouth 2 (two) times daily.      carvedilol (COREG) 6.25 MG tablet Take 6.25 mg by mouth 2 (two) times daily with meals.        donepezil (ARICEPT) 5 MG tablet Take 5 mg by mouth every evening.      memantine (NAMENDA) 10 MG tablet Take 10 mg by mouth 2 (two) times daily.        potassium chloride SA (K-DUR,KLOR-CON) 20 MEQ tablet Take 20 mEq by mouth once daily.       QUEtiapine (SEROQUEL) 25 MG Tab Take 25 mg by mouth 2 (two) times daily. Take 1-2  tablets by mouth 2 times daily      valsartan (DIOVAN) 160 MG tablet Take 160 mg by mouth once daily.      ergocalciferol (ERGOCALCIFEROL) 50,000 unit Cap Take 50,000 Units by mouth every 14 (fourteen) days.         STOP taking these medications       aspirin 81 MG chewable tablet Comments:   Reason for Stopping:         atorvastatin (LIPITOR) 80 MG tablet Comments:   Reason for Stopping:         furosemide (LASIX) 40 MG tablet Comments:   Reason for Stopping:         losartan (COZAAR) 100 MG tablet Comments:   Reason for Stopping:             Follow up:   Follow-up Information     Martín Perales MD In 3 weeks.    Specialty:  Internal Medicine  Contact information:  [address] [place] [address]  [phone]             Brando Neves MD In 2 weeks.    Specialties:  Orthopedic Surgery, Surgery, Sports Medicine  Contact information:  [address]  [address] [address]  [phone]

## 2018-10-31 NOTE — PROGRESS NOTES
Pt remains free of falls, VS stable, RR even and unlabored, clear speech, bed in low position with wheels locked call light in reach, Patient discharging to [facility], no signs of distress at this time, VS stable, IV removed as ordered, tolerated well, discharge instructions given and patient verbalizes understanding, patient leaving the floor assisted by staff member, PPD form sent with patient and also faxed to [facility] @ [phone]

## 2018-10-31 NOTE — SUBJECTIVE & OBJECTIVE
"Principal Problem:Left displaced femoral neck fracture    Principal Orthopedic Problem: S/P L shahrzad- KAYLEE    Interval History: increased dementia post-op    Review of patient's allergies indicates:  No Known Allergies    Current Facility-Administered Medications   Medication    acetaminophen tablet 650 mg    amLODIPine tablet 5 mg    aspirin tablet 325 mg    bisacodyl suppository 10 mg    carvedilol tablet 6.25 mg    dextrose 5 % and 0.45 % NaCl infusion    donepezil tablet 5 mg    electrolyte-S (ISOLYTE)    famotidine tablet 20 mg    HYDROcodone-acetaminophen 5-325 mg per tablet 1 tablet    memantine tablet 10 mg    mupirocin 2 % ointment 1 g    ondansetron disintegrating tablet 8 mg    QUEtiapine tablet 25 mg    ramelteon tablet 8 mg    sodium chloride 0.9% flush 5 mL    valsartan tablet 160 mg     Objective:     Vital Signs (Most Recent):  Temp: 97 °F (36.1 °C) (10/31/18 0718)  Pulse: 66 (10/31/18 0718)  Resp: 16 (10/31/18 0718)  BP: (!) 184/80 (10/31/18 0718)  SpO2: 95 % (10/31/18 0718) Vital Signs (24h Range):  Temp:  [97 °F (36.1 °C)-101.1 °F (38.4 °C)] 97 °F (36.1 °C)  Pulse:  [66-78] 66  Resp:  [16-18] 16  SpO2:  [91 %-96 %] 95 %  BP: (136-184)/(64-80) 184/80     Weight: 96.6 kg (213 lb)  Height: 5' 11" (180.3 cm)  Body mass index is 29.71 kg/m².      Intake/Output Summary (Last 24 hours) at 10/31/2018 0725  Last data filed at 10/31/2018 0537  Gross per 24 hour   Intake 2011.25 ml   Output --   Net 2011.25 ml       General    Vitals reviewed.  Constitutional: He appears well-developed and well-nourished.   Pulmonary/Chest: Effort normal.   Abdominal: Soft.   Neurological: He is alert.         Left Hip Exam     Comments:  LLE DNVI. Dressing clean and dry            Significant Labs:   CBC:   Recent Labs   Lab 10/29/18  1651 10/30/18  1105 10/31/18  0507   WBC 7.70 6.70 7.00   HGB 10.6* 11.0* 10.8*   HCT 31.7* 32.2* 31.4*   * 137* 139*     CMP:   Recent Labs   Lab 10/30/18  1105 " 10/31/18  0507   * 133*   K 3.6 3.3*    103   CO2 24 23   * 116*   BUN 16 18   CREATININE 0.7 0.8   CALCIUM 8.1* 8.0*   ANIONGAP 7* 7*   EGFRNONAA >60 >60     All pertinent labs within the past 24 hours have been reviewed.    Significant Imaging: X-Ray: I have reviewed all pertinent results/findings and my personal findings are:  Left hip arthroplasty with no immediate complication.

## 2018-10-31 NOTE — PLAN OF CARE
Pt is accepted at [facility].  Cm has sent all information to [facility] via [employer].  Pending [employer] authorization and Pt needs to have a BM.  Cm informed son Bryan Osorio at [phone] and updated him of the above information.  Please call  Bryan if pt is approved today!  I'm going to a meeting and will not be back until possible after 3:00pm.  Yakov notified SUMIT Martell and [facility].       10/31/18 1221   Discharge Reassessment   Assessment Type Discharge Planning Reassessment   Provided patient/caregiver education on the expected discharge date and the discharge plan Yes   Do you have any problems affording any of your prescribed medications? No   Discharge Plan A Skilled Nursing Facility  ([facility])

## 2018-10-31 NOTE — PLAN OF CARE
10/31/18 1606   Final Note   Assessment Type Final Discharge Note   Anticipated Discharge Disposition SNF  ([facility])

## 2018-10-31 NOTE — PT/OT/SLP PROGRESS
Physical Therapy Treatment    Patient Name:  Adrian Osorio   MRN:  [id number]    Recommendations:     Discharge Recommendations:  nursing facility, skilled   Discharge Equipment Recommendations:     Barriers to discharge: None    Assessment:     Adrian Osorio is a 85 y.o. male admitted with a medical diagnosis of Left displaced femoral neck fracture.  He presents with the following impairments/functional limitations:  weakness, impaired endurance, impaired self care skills, impaired cognition, impaired functional mobilty, pain, decreased lower extremity function, decreased ROM, orthopedic precautions, decreased upper extremity function, decreased coordination, impaired balance, gait instability. Patient is extremely hard of hearing and communication is hard with patient not hearing or understanding directions with verbal or visual cues or even tactile cues. He has pain that subsides some during gait but then increases with fatigue. His pain in the left hip seems to be the worst with sit to stand and stand to sit.    Rehab Prognosis:  fair; patient would benefit from acute skilled PT services to address these deficits and reach maximum level of function.      Recent Surgery: Procedure(s) (LRB):  HEMIARTHROPLASTY, HIP (Left) 2 Days Post-Op    Plan:     During this hospitalization, patient to be seen BID to address the above listed problems via gait training, therapeutic activities, therapeutic exercises  · Plan of Care Expires:  11/30/18   Plan of Care Reviewed with: patient, daughter    Subjective     Communicated with nurse Martell prior to session.  Patient found in supine upon PT entry to room, agreeable to treatment.      Chief Complaint: none, pleasant until sat patient up and then he c/o left hip pain  Patient comments/goals: doesn't understand the question. States he wants to quit hurting.  Pain/Comfort:  · Pain Rating 1: (in pain with transfers but cannot verbalize due to hearing deficite)  · Location -  Side 1: Left  · Location 1: hip  · Pain Addressed 1: Pre-medicate for activity, Reposition  · Pain Rating Post-Intervention 1: 0/10    Patients cultural, spiritual, Sikhism conflicts given the current situation:      Objective:     Patient found with: SCD, peripheral IV     General Precautions: Standard, fall   Orthopedic Precautions:LLE weight bearing as tolerated, LLE posterior precautions   Braces: N/A     Functional Mobility:  · Bed Mobility:     · Rolling Left:  moderate assistance and of 2 persons  · Rolling Right: moderate assistance and of 2 persons  · Scooting: moderate assistance and of 2 persons  · Supine to Sit: moderate assistance  · Sit to Supine: of 2 persons  · Transfers:     · Sit to Stand:  moderate assistance with rolling walker and 2 persons  · Bed to Chair: moderate assistance and of 2 persons with  rolling walker  using  Stand Pivot  · Gait: 40' with RW and CGA with chair pushed behind for safety and mod A for help with control of RW      AM-PAC 6 CLICK MOBILITY          Therapeutic Activities and Exercises:   GS, QS, HS, AP, x 10    Patient left up in recliner with all lines intact, call button in reach, nurse Scot notified and daughter present..    GOALS:   Multidisciplinary Problems     Physical Therapy Goals        Problem: Physical Therapy Goal    Goal Priority Disciplines Outcome Goal Variances Interventions   Physical Therapy Goal     PT, PT/OT Ongoing (interventions implemented as appropriate)     Description:  Goals to be met by: 2018     Patient will increase functional independence with mobility by performin. Supine to sit with Moderate Assistance  2. Sit to stand transfer with Moderate Assistance  3. Bed to chair transfer with Moderate Assistance using Rolling Walker  4. Gait  x 100 feet with Moderate Assistance using Rolling Walker.   5. Lower extremity exercise program x20 reps per handout, with assistance as needed                      Time Tracking:     PT  Received On: 10/31/18  PT Start Time: 0915     PT Stop Time: 0945  PT Total Time (min): 30 min     Billable Minutes: Gait Training 15 and Therapeutic Exercise 15    Treatment Type: Treatment  PT/PTA: PTA     PTA Visit Number: 1     Madeleine Garrett, PTA  10/31/2018

## 2018-10-31 NOTE — PROGRESS NOTES
10/31/18 0915   Patient Assessment/Suction   Level of Consciousness (AVPU) alert   Respiratory Effort Normal;Unlabored   PRE-TX-O2-ETCO2   O2 Device (Oxygen Therapy) nasal cannula   $ Is the patient on Low Flow Oxygen? Yes   Flow (L/min) 2   Oxygen Concentration (%) 28   SpO2 97 %   Pulse Oximetry Type Intermittent   $ Pulse Oximetry - Multiple Charge Pulse Oximetry - Multiple   Pulse 82   Resp 16

## 2018-11-01 NOTE — HOSPITAL COURSE
Patient was monitored closely throughout his hospital stay. He underwent left hip hemiarthroplasty on 10/29 with Dr. Neves. He had a fever in the post operative setting but blood cultures were with no growth and patient did not appear to have infectious source. He received standard post-operative antibiotics and fever resolved. He was placed on ASA 325BID x 30 days for DVT PPX, after which he can return to his ASA 81 mg daily dose. Pain was controlled with medications. PT and OT were consulted and recommended SNF. Patient was discharged to [facility]. Patient is to follow up with Dr. Neves in 2 weeks. And his PCP in 3 weeks.    For his dementia, patient was continued on home medications. Delerium precautions were taken. His mental status waxed and waned throughout his stay.  Hid home anti-hypertensives were continued, as were his heart failure medications.

## 2018-11-01 NOTE — DISCHARGE SUMMARY
[facility] Hospital Medicine  Discharge Summary      Patient Name: Adrian Osorio  MRN: [id number]  Admission Date: 10/27/2018  Hospital Length of Stay: 4 days  Discharge Date and Time: 10/31/2018 12:30 PM  Attending Physician: No att. providers found   Discharging Provider: Barbie Montes De Oca MD  Primary Care Provider: Martín Perales MD      HPI:   Mr. 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 He presents to the ED with c/o left hip pain. It started after having a fall while in his room. While in the ED, he had a left hip Xray which showed a femoral neck fracture. His family is at bedside. He is currently complaining left hip pain and sates that the Morphine did not help.      Procedure(s) (LRB):  HEMIARTHROPLASTY, HIP (Left)      Hospital Course:   Patient was monitored closely throughout his hospital stay. He underwent left hip hemiarthroplasty on 10/29 with Dr. Neves. He had a fever in the post operative setting but blood cultures were with no growth and patient did not appear to have infectious source. He received standard post-operative antibiotics and fever resolved. He was placed on ASA 325BID x 30 days for DVT PPX, after which he can return to his ASA 81 mg daily dose. Pain was controlled with medications. PT and OT were consulted and recommended SNF. Patient was discharged to [facility]. Patient is to follow up with Dr. Neves in 2 weeks. And his PCP in 3 weeks.    For his dementia, patient was continued on home medications. Delerium precautions were taken. His mental status waxed and waned throughout his stay.  Hid home anti-hypertensives were continued, as were his heart failure medications.     Consults:     No new Assessment & Plan notes have been filed under this hospital service since the last note was generated.  Service: Hospital Medicine    Final Active Diagnoses:    Diagnosis Date Noted POA    PRINCIPAL PROBLEM:  Left  displaced femoral neck fracture [S72.002A] 10/28/2018 Yes    Essential hypertension [I10] 10/28/2018 Yes    Chronic systolic heart failure/ ICMO EF 40% [I50.22] 10/28/2018 Yes    Alzheimer's dementia with behavioral disturbance [G30.9, F02.81] 10/28/2018 Yes      Problems Resolved During this Admission:       Discharged Condition: fair    Disposition: Skilled Nursing Facility    Follow Up:  Follow-up Information     Martín Perales MD In 3 weeks.    Specialty:  Internal Medicine  Contact information:  [address] [place] [address]  [phone]             Brando Neves MD In 2 weeks.    Specialties:  Orthopedic Surgery, Surgery, Sports Medicine  Contact information:  [address] [facility] [address]  [facility] [address]  [phone]                 Patient Instructions:      Notify your health care provider if you experience any of the following:  temperature >100.4     Notify your health care provider if you experience any of the following:  severe uncontrolled pain     Notify your health care provider if you experience any of the following:  redness, tenderness, or signs of infection (pain, swelling, redness, odor or green/yellow discharge around incision site)     Weight bearing restrictions (specify):   Order Comments: Hip precautions  Weight bearing as tolerated       Significant Diagnostic Studies:   X-ray - left femoral neck fracture    Medications:  Reconciled Home Medications:      Medication List      START taking these medications    aspirin 325 MG tablet  Take 1 tablet (325 mg total) by mouth 2 (two) times daily. for 21 days  Replaces:  aspirin 81 MG Chew     bisacodyl 10 mg Supp  Commonly known as:  DULCOLAX  Place 1 suppository (10 mg total) rectally once daily. for 3 days     HYDROcodone-acetaminophen 5-325 mg per tablet  Commonly known as:  NORCO  Take 1 tablet by mouth every 4 (four) hours as needed.        CONTINUE taking these medications    amLODIPine 5 MG tablet  Commonly known as:   NORVASC  Take 5 mg by mouth 2 (two) times daily.     carvedilol 6.25 MG tablet  Commonly known as:  COREG  Take 6.25 mg by mouth 2 (two) times daily with meals.     donepezil 5 MG tablet  Commonly known as:  ARICEPT  Take 5 mg by mouth every evening.     ergocalciferol 50,000 unit Cap  Commonly known as:  ERGOCALCIFEROL  Take 50,000 Units by mouth every 14 (fourteen) days.     memantine 10 MG Tab  Commonly known as:  NAMENDA  Take 10 mg by mouth 2 (two) times daily.     potassium chloride SA 20 MEQ tablet  Commonly known as:  K-DUR,KLOR-CON  Take 20 mEq by mouth once daily.     QUEtiapine 25 MG Tab  Commonly known as:  SEROQUEL  Take 25 mg by mouth 2 (two) times daily. Take 1-2 tablets by mouth 2 times daily     valsartan 160 MG tablet  Commonly known as:  DIOVAN  Take 160 mg by mouth once daily.        STOP taking these medications    aspirin 81 MG Chew  Replaced by:  aspirin 325 MG tablet     atorvastatin 80 MG tablet  Commonly known as:  LIPITOR     furosemide 40 MG tablet  Commonly known as:  LASIX     losartan 100 MG tablet  Commonly known as:  COZAAR            Indwelling Lines/Drains at time of discharge:   Lines/Drains/Airways          None          Time spent on the discharge of patient: 45 minutes  Patient was seen and examined on the date of discharge and determined to be suitable for discharge.         Barbie Montes De Oca MD  Department of Hospital Medicine  [facility]

## 2018-11-02 ENCOUNTER — TELEPHONE (OUTPATIENT)
Dept: MEDSURG UNIT | Facility: HOSPITAL | Age: 83
End: 2018-11-02

## 2018-11-02 LAB
BLD PROD TYP BPU: NORMAL
BLD PROD TYP BPU: NORMAL
BLOOD UNIT EXPIRATION DATE: NORMAL
BLOOD UNIT EXPIRATION DATE: NORMAL
BLOOD UNIT TYPE CODE: 6200
BLOOD UNIT TYPE CODE: 6200
BLOOD UNIT TYPE: NORMAL
BLOOD UNIT TYPE: NORMAL
CODING SYSTEM: NORMAL
CODING SYSTEM: NORMAL
DISPENSE STATUS: NORMAL
DISPENSE STATUS: NORMAL
NUM UNITS TRANS PACKED RBC: NORMAL
NUM UNITS TRANS PACKED RBC: NORMAL

## 2018-11-04 LAB
BACTERIA BLD CULT: NORMAL
BACTERIA BLD CULT: NORMAL

## 2018-11-13 ENCOUNTER — OFFICE VISIT (OUTPATIENT)
Dept: ORTHOPEDICS | Facility: CLINIC | Age: 83
End: 2018-11-13
Payer: MEDICARE

## 2018-11-13 VITALS
BODY MASS INDEX: 29.82 KG/M2 | DIASTOLIC BLOOD PRESSURE: 62 MMHG | WEIGHT: 213 LBS | HEART RATE: 66 BPM | SYSTOLIC BLOOD PRESSURE: 125 MMHG | HEIGHT: 71 IN

## 2018-11-13 DIAGNOSIS — S72.002A LEFT DISPLACED FEMORAL NECK FRACTURE: Primary | ICD-10-CM

## 2018-11-13 PROCEDURE — 99024 POSTOP FOLLOW-UP VISIT: CPT | Mod: ,,, | Performed by: ORTHOPAEDIC SURGERY

## 2018-11-13 PROCEDURE — 72170 X-RAY EXAM OF PELVIS: CPT | Mod: ,,, | Performed by: ORTHOPAEDIC SURGERY

## 2018-11-13 NOTE — PROGRESS NOTES
[facility] ORTHOPEDICS    Subjective:     Chief Complaint:   Chief Complaint   Patient presents with    Left Hip - Post-op Evaluation     Hemiarthroplasty left hip 10/29/18. Suture removal. States that he started to have pain after he fell 10/27/18. States that he did go to the ER and was dx with a fx of his hip. States that he did have surgery on the hip.        Past Medical History:   Diagnosis Date    CHF (congestive heart failure)     Coronary artery disease     Hypertension        Past Surgical History:   Procedure Laterality Date    CARDIAC SURGERY      cabg    HEMIARTHROPLASTY OF HIP Left 10/29/2018    Procedure: HEMIARTHROPLASTY, HIP;  Surgeon: Brando Neves MD;  Location: [facility];  Service: Orthopedics;  Laterality: Left;    HEMIARTHROPLASTY, HIP Left 10/29/2018    Performed by Brando Neves MD at [facility] OR       Current Outpatient Medications   Medication Sig    amLODIPine (NORVASC) 5 MG tablet Take 5 mg by mouth 2 (two) times daily.    aspirin 325 MG tablet Take 1 tablet (325 mg total) by mouth 2 (two) times daily. for 21 days    carvedilol (COREG) 6.25 MG tablet Take 6.25 mg by mouth 2 (two) times daily with meals.      donepezil (ARICEPT) 5 MG tablet Take 5 mg by mouth every evening.    ergocalciferol (ERGOCALCIFEROL) 50,000 unit Cap Take 50,000 Units by mouth every 14 (fourteen) days.    HYDROcodone-acetaminophen (NORCO) 5-325 mg per tablet Take 1 tablet by mouth every 4 (four) hours as needed.    memantine (NAMENDA) 10 MG tablet Take 10 mg by mouth 2 (two) times daily.      potassium chloride SA (K-DUR,KLOR-CON) 20 MEQ tablet Take 20 mEq by mouth once daily.     QUEtiapine (SEROQUEL) 25 MG Tab Take 25 mg by mouth 2 (two) times daily. Take 1-2 tablets by mouth 2 times daily    valsartan (DIOVAN) 160 MG tablet Take 160 mg by mouth once daily.     No current facility-administered medications for this visit.        Review of patient's allergies indicates:  No Known Allergies    Family  History   Problem Relation Age of Onset    Heart disease Mother     Heart disease Father     Heart attack Sister     Hypertension Brother     Stroke Brother        Social History     Socioeconomic History    Marital status:      Spouse name: Not on file    Number of children: Not on file    Years of education: Not on file    Highest education level: Not on file   Social Needs    Financial resource strain: Not on file    Food insecurity - worry: Not on file    Food insecurity - inability: Not on file    Transportation needs - medical: Not on file    Transportation needs - non-medical: Not on file   Occupational History    Not on file   Tobacco Use    Smoking status: Never Smoker    Smokeless tobacco: Never Used   Substance and Sexual Activity    Alcohol use: No    Drug use: Not on file    Sexual activity: Not Currently   Other Topics Concern    Not on file   Social History Narrative    Not on file       History of present illness: Patient comes in today for his left hemiarthroplasty. He is doing very well he has no pain.      Review of Systems:    Constitution: Negative for chills, fever, and sweats.  Negative for unexplained weight loss.    HENT:  Negative for headaches and blurry vision.    Cardiovascular:Negative for chest pain or irregular heart beat. Negative for hypertension.    Respiratory:  Negative for cough and shortness of breath.    Gastrointestinal: Negative for abdominal pain, heartburn, melena, nausea, and vomitting.    Genitourinary:  Negative bladder incontinence and dysuria.    Musculoskeletal:  See HPI for details.     Neurological: Negative for numbness.    Psychiatric/Behavioral: Negative for depression.  The patient is not nervous/anxious.      Endocrine: Negative for polyuria    Hematologic/Lymphatic: Negative for bleeding problem.  Does not bruise/bleed easily.    Skin: Negative for poor would healing and rash    Objective:      Physical Examination:    Vital  Signs:    Vitals:    11/13/18 1129   BP: 125/62   Pulse: 66       Body mass index is 29.71 kg/m².    This a well-developed, well nourished patient in no acute distress.  They are alert and oriented and cooperative to examination.        Wounds are clean dry and intact. Calf is soft.  Pertinent New Results:    XRAY Report / Interpretation:   AP of the pelvis demonstrates a left hemiarthroplasty to be in ideal position no evidence of subsidence or loosening    Assessment/Plan:      Stable following left hemiarthroplasty. Follow-up in 4 weeks      This note was created using Dragon voice recognition software that occasionally misinterpreted phrases or words.

## 2018-12-17 ENCOUNTER — HOSPITAL ENCOUNTER (EMERGENCY)
Facility: HOSPITAL | Age: 83
Discharge: HOME | End: 2018-12-17
Attending: EMERGENCY MEDICINE
Payer: MEDICARE

## 2018-12-17 VITALS
SYSTOLIC BLOOD PRESSURE: 161 MMHG | HEART RATE: 74 BPM | DIASTOLIC BLOOD PRESSURE: 70 MMHG | RESPIRATION RATE: 16 BRPM | OXYGEN SATURATION: 98 %
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
## 2018-12-17 DIAGNOSIS — S73.005A HIP DISLOCATION, LEFT: Primary | ICD-10-CM

## 2018-12-17 DIAGNOSIS — S01.81XA FACIAL LACERATION, INITIAL ENCOUNTER: ICD-10-CM

## 2018-12-17 LAB
ALBUMIN SERPL BCP-MCNC: 2.7 G/DL
ALP SERPL-CCNC: 102 U/L
ALT SERPL W/O P-5'-P-CCNC: 13 U/L
ANION GAP SERPL CALC-SCNC: 10 MMOL/L
AST SERPL-CCNC: 31 U/L
BASOPHILS # BLD AUTO: 0.1 K/UL
BASOPHILS NFR BLD: 1.1 %
BILIRUB SERPL-MCNC: 0.6 MG/DL
BUN SERPL-MCNC: 14 MG/DL
CALCIUM SERPL-MCNC: 9.3 MG/DL
CHLORIDE SERPL-SCNC: 108 MMOL/L
CO2 SERPL-SCNC: 24 MMOL/L
CREAT SERPL-MCNC: 0.9 MG/DL
DIFFERENTIAL METHOD: ABNORMAL
EOSINOPHIL # BLD AUTO: 0.1 K/UL
EOSINOPHIL NFR BLD: 0.9 %
ERYTHROCYTE [DISTWIDTH] IN BLOOD BY AUTOMATED COUNT: 15.2 %
EST. GFR  (AFRICAN AMERICAN): >60 ML/MIN/1.73 M^2
EST. GFR  (NON AFRICAN AMERICAN): >60 ML/MIN/1.73 M^2
GLUCOSE SERPL-MCNC: 114 MG/DL
HCT VFR BLD AUTO: 37.6 %
HGB BLD-MCNC: 12.2 G/DL
LYMPHOCYTES # BLD AUTO: 0.7 K/UL
LYMPHOCYTES NFR BLD: 8.5 %
MCH RBC QN AUTO: 29.7 PG
MCHC RBC AUTO-ENTMCNC: 32.5 G/DL
MCV RBC AUTO: 91 FL
MONOCYTES # BLD AUTO: 0.8 K/UL
MONOCYTES NFR BLD: 9.1 %
NEUTROPHILS # BLD AUTO: 6.9 K/UL
NEUTROPHILS NFR BLD: 80.4 %
PLATELET # BLD AUTO: 426 K/UL
PMV BLD AUTO: 7.6 FL
POTASSIUM SERPL-SCNC: 4.1 MMOL/L
PROT SERPL-MCNC: 6.9 G/DL
RBC # BLD AUTO: 4.12 M/UL
SODIUM SERPL-SCNC: 142 MMOL/L
WBC # BLD AUTO: 8.6 K/UL

## 2018-12-17 PROCEDURE — 80053 COMPREHEN METABOLIC PANEL: CPT

## 2018-12-17 PROCEDURE — 85025 COMPLETE CBC W/AUTO DIFF WBC: CPT

## 2018-12-17 PROCEDURE — 36415 COLL VENOUS BLD VENIPUNCTURE: CPT

## 2018-12-17 PROCEDURE — 12001 RPR S/N/AX/GEN/TRNK 2.5CM/<: CPT

## 2018-12-17 PROCEDURE — 25000003 PHARM REV CODE 250: Performed by: PHYSICIAN ASSISTANT

## 2018-12-17 PROCEDURE — 27265 TREAT HIP DISLOCATION: CPT | Mod: RT

## 2018-12-17 PROCEDURE — 93005 ELECTROCARDIOGRAM TRACING: CPT

## 2018-12-17 PROCEDURE — 99285 EMERGENCY DEPT VISIT HI MDM: CPT | Mod: 25

## 2018-12-17 RX ORDER — LIDOCAINE HYDROCHLORIDE 10 MG/ML
10 INJECTION INFILTRATION; PERINEURAL
Status: COMPLETED | OUTPATIENT
Start: 2018-12-17 | End: 2018-12-17

## 2018-12-17 RX ORDER — PROPOFOL 10 MG/ML
INJECTION, EMULSION INTRAVENOUS
Status: DISCONTINUED
Start: 2018-12-17 | End: 2018-12-17 | Stop reason: HOSPADM

## 2018-12-17 RX ADMIN — LIDOCAINE HYDROCHLORIDE 10 ML: 10 INJECTION, SOLUTION INFILTRATION; PERINEURAL at 02:12

## 2018-12-17 NOTE — ED PROVIDER NOTES
Encounter Date: 12/17/2018    SCRIBE #1 NOTE: I, Fady Whelan, am scribing for, and in the presence of, Laura Tejeda PA-C.       History     Chief Complaint   Patient presents with    Hip Pain     Complains of left leg pain after a fall     Time seen by provider: 12:32 PM on 12/17/2018      Adrian Osorio is a 85 y.o. male with a PMHx HTN, CAD, CHF, and dementia who presents to the ED via EMS for further evaluation of left hip pains status post fall that occurred 1 hour PTA. According to EMS the daughter relays that the patient was trying to get into the bathtub, but fell over. The patient is unsure of how he fell. Patient did strike his head and has a laceration to his forehead. EMS relays that the patient has an obvious deformity to the left hip. Family relays that the patient had a hip replacement on the left hip previously. Per son, the patient has been having increased confusion since having a cholecystomy on 11/30. The patient does admit to having some neck pain as well, but states that it is minimal. Patient denies chest pain, abdominal pain, nausea, vomiting, SOB, back pain, and headache. The patient has a PSHx of hemiarthroplasty of left hip and cardiac surgery.      The history is provided by the patient, the EMS personnel and a relative.     Review of patient's allergies indicates:  No Known Allergies  Past Medical History:   Diagnosis Date    CHF (congestive heart failure)     Coronary artery disease     Hypertension      Past Surgical History:   Procedure Laterality Date    CARDIAC SURGERY      cabg    HEMIARTHROPLASTY OF HIP Left 10/29/2018    Procedure: HEMIARTHROPLASTY, HIP;  Surgeon: Brando Neves MD;  Location: [facility] OR;  Service: Orthopedics;  Laterality: Left;    HEMIARTHROPLASTY, HIP Left 10/29/2018    Performed by Brando Neves MD at [facility] OR     Family History   Problem Relation Age of Onset    Heart disease Mother     Heart disease Father     Heart attack Sister      Hypertension Brother     Stroke Brother      Social History     Tobacco Use    Smoking status: Never Smoker    Smokeless tobacco: Never Used   Substance Use Topics    Alcohol use: No    Drug use: Not on file     Review of Systems   Constitutional: Negative for activity change, appetite change, chills and fever.   HENT: Negative for congestion, rhinorrhea and sore throat.    Eyes: Negative for redness and visual disturbance.   Respiratory: Negative for cough, chest tightness and shortness of breath.    Cardiovascular: Negative for chest pain.   Gastrointestinal: Negative for abdominal pain, diarrhea, nausea and vomiting.   Genitourinary: Negative for dysuria and frequency.   Musculoskeletal: Positive for arthralgias and neck pain. Negative for back pain and neck stiffness.   Skin: Positive for wound. Negative for rash.   Neurological: Negative for dizziness, syncope, numbness and headaches.   Psychiatric/Behavioral: Positive for confusion.       Physical Exam     Initial Vitals [12/17/18 1233]   BP Pulse Resp Temp SpO2   (!) 193/93 98 18 -- (!) 94 %      MAP       --         Physical Exam    Nursing note and vitals reviewed.  Constitutional: He appears well-developed and well-nourished. He is cooperative.  Non-toxic appearance. He does not have a sickly appearance.   HENT:   Head: Normocephalic and atraumatic.   Right Ear: External ear normal.   Left Ear: External ear normal.   Nose: Nose normal.   Mouth/Throat: Oropharynx is clear and moist.   Eyes: Conjunctivae and lids are normal. Pupils are equal, round, and reactive to light.   Neck: Normal range of motion and full passive range of motion without pain. Neck supple. No spinous process tenderness present.   Cardiovascular: Normal rate, regular rhythm and normal heart sounds. Exam reveals no gallop and no friction rub.    No murmur heard.  2+ DP pulse on the right lower extremity   Pulmonary/Chest: Breath sounds normal. He has no wheezes. He has no rhonchi. He  has no rales.   Abdominal: Soft. Normal appearance. There is no tenderness. There is no rigidity, no rebound and no guarding.   Musculoskeletal: He exhibits tenderness.   Bony tenderness to the left hip that is internally rotated. ROM was not assessed secondary to pain.    Neurological: He is alert. GCS eye subscore is 4. GCS verbal subscore is 5. GCS motor subscore is 6.   Only oriented to self. Sensation is intact.   Skin: Skin is warm and dry. Laceration noted. No rash noted.   .5 cm star shaped laceration above bridge of the nose  1 cm linear horizontal laceration the the bride of the nose  .5 cm laceration in a U shape on the bridge of the nose         ED Course   Orthopedic Injury  Date/Time: 12/17/2018 3:02 PM  Performed by: Ilya Oakes MD  Authorized by: Ilya Oakes MD     Injury:     Injury location:  Hip    Location details:  Left hip    Injury type:  Dislocation    Dislocation type: anterior      Spontaneous?: No      Prosthesis?: Yes        Pre-procedure assessment:     Neurovascular status: Neurovascularly intact      Distal perfusion: normal      Neurological function: normal      Range of motion: reduced      Patient sedated?: Yes      ASA Class:  Class 3 - Systemic Illness with functional impairment.    Mallampati Score:  Class 2 - Visualization of the soft palate, fauces, and uvula.  Date/Time of last solid:  12/17/2018 7:30 AM    Date/Time of last fluid:  12/17/2018 7:43 AM    Sedation:  Propofol    Sedation start:  12/17/2018 2:55 PM    Sedation end:  12/17/2018 3:00 PM    Vital signs: Vital signs monitored during sedation        Selections made in this section will also lock the Injury type section above.:     Manipulation performed?: Yes      Reduction method:  Allis maneuver    Reduction method:  Allis maneuver    Reduction method:  Allis maneuver    Reduction method:  Allis maneuver    Reduction method:  Allis maneuver    Reduction method:  Allis maneuver    Reduction successful?: Yes       Confirmation: Reduction confirmed by x-ray      Complications: No      Specimens: No      Implants: No    Post-procedure assessment:     Neurovascular status: Neurovascularly intact      Distal perfusion: normal      Neurological function: normal      Range of motion: normal      Patient tolerance:  Patient tolerated the procedure well with no immediate complications  Lac Repair  Date/Time: 12/17/2018 4:04 PM  Performed by: Laura Tejeda PA-C  Authorized by: Ilya Oakes MD   Body area: head/neck  Location details: forehead  Laceration length: 0.5 cm  Foreign bodies: no foreign bodies  Tendon involvement: none  Nerve involvement: none  Vascular damage: no  Anesthesia: local infiltration    Anesthesia:  Local Anesthetic: lidocaine 1% with epinephrine  Anesthetic total: 2 mL  Patient sedated: no  Preparation: Patient was prepped and draped in the usual sterile fashion.  Irrigation solution: saline  Irrigation method: syringe  Amount of cleaning: standard  Debridement: none  Degree of undermining: none  Skin closure: 5-0 nylon  Number of sutures: 3  Technique: simple  Approximation: close  Approximation difficulty: simple  Dressing: open (no dressing)  Patient tolerance: Patient tolerated the procedure well with no immediate complications    Lac Repair  Date/Time: 12/17/2018 4:20 PM  Performed by: Laura Tejeda PA-C  Authorized by: Ilya Okaes MD   Body area: head/neck  Location details: nose  Laceration length: 1 cm  Foreign bodies: no foreign bodies  Tendon involvement: none  Nerve involvement: none  Vascular damage: no  Preparation: Patient was prepped and draped in the usual sterile fashion.  Irrigation solution: saline  Debridement: none  Skin closure: glue  Approximation: close  Approximation difficulty: simple  Dressing: open (no dressing)  Patient tolerance: Patient tolerated the procedure well with no immediate complications    Lac Repair  Date/Time: 12/17/2018 4:25  PM  Performed by: Laura Tejeda PA-C  Authorized by: Ilya Oakes MD   Body area: head/neck  Location details: nose  Laceration length: 0.5 cm  Foreign bodies: no foreign bodies  Tendon involvement: none  Nerve involvement: none  Preparation: Patient was prepped and draped in the usual sterile fashion.  Amount of cleaning: standard  Debridement: none  Degree of undermining: none  Skin closure: glue  Approximation: close  Approximation difficulty: simple  Patient tolerance: Patient tolerated the procedure well with no immediate complications        Labs Reviewed   CBC W/ AUTO DIFFERENTIAL - Abnormal; Notable for the following components:       Result Value    RBC 4.12 (*)     Hemoglobin 12.2 (*)     Hematocrit 37.6 (*)     RDW 15.2 (*)     Platelets 426 (*)     MPV 7.6 (*)     Lymph # 0.7 (*)     Gran% 80.4 (*)     Lymph% 8.5 (*)     All other components within normal limits   COMPREHENSIVE METABOLIC PANEL - Abnormal; Notable for the following components:    Glucose 114 (*)     Albumin 2.7 (*)     All other components within normal limits          Imaging Results          X-Ray Hip 1 View Left (Final result)  Result time 12/17/18 15:36:56   Procedure changed from X-Ray Hip 2 View Left     Final result by Risa Grant MD (12/17/18 15:36:56)                 Impression:      As above      Electronically signed by: Risa Grant MD  Date:    12/17/2018  Time:    15:36             Narrative:    EXAMINATION:  XR HIP 1 VIEW LEFT    CLINICAL HISTORY:  port reduction;  Unspecified dislocation of left hip, initial encounter    TECHNIQUE:  Portable AP view left hip    COMPARISON:  12/17/2018    FINDINGS:  As visualized in the single AP projection there has been reduction of the previously seen dislocation of the left hip.  The endoprosthesis extends which into the proximal femoral shaft is not fully visualized on the image.                               CT Head Without Contrast (Final result)  Result  time 12/17/18 13:23:52    Final result by Angel Haley MD (12/17/18 13:23:52)                 Impression:      Moderate frontal scalp contusion without calvarial fracture or acute intracranial injury.    Small chronic infarcts and moderate chronic white matter change.      Electronically signed by: Angel Haley MD  Date:    12/17/2018  Time:    13:23             Narrative:    EXAMINATION:  CT HEAD WITHOUT CONTRAST    CLINICAL HISTORY:  head trauma/on ASA;    TECHNIQUE:  Low dose axial images were obtained through the head.  Coronal and sagittal reformations were also performed. Contrast was not administered.    COMPARISON:  None.    FINDINGS:  There is a moderate bifrontal scalp contusion.  The calvarium is intact.  There is no intracranial hemorrhage.  No extra-axial collection.  Moderate generalized cerebral volume loss is present with compensatory ventricular enlargement.  There is moderate chronic white matter change.  Small chronic infarcts are present within the right centrum semiovale and the right cerebellar hemisphere.  A chronic lacunar infarct is present within the posterior limb of the left internal capsule.  There is heavy calcification of the intracranial ICAs.                               X-Ray Hip 2 View Left (Final result)  Result time 12/17/18 13:04:47    Final result by Risa Grant MD (12/17/18 13:04:47)                 Impression:      Left hip dislocation      Electronically signed by: Risa Grant MD  Date:    12/17/2018  Time:    13:04             Narrative:    EXAMINATION:  XR HIP 2 VIEW LEFT    CLINICAL HISTORY:  Unspecified fall, initial encounter    TECHNIQUE:  AP view of the pelvis and frog leg lateral view of the left hip were performed.    COMPARISON:  AP left hip of 10/29/2018    FINDINGS:  There is left hip arthroplasty with the prosthetic femoral head disc located cephalad up out of the acetabulum.                                 Medical Decision Making:    History:   Old Medical Records: I decided to obtain old medical records.  Clinical Tests:   Lab Tests: Reviewed and Ordered  Radiological Study: Reviewed and Ordered  Medical Tests: Ordered and Reviewed       APC / Resident Notes:   Urgent evaluation of an 85-year-old male who presents with left hip pain after a fall.  Recent hip replacement performed by Dr. Neves.  He is neurovascularly intact.  X-ray confirms dislocation but no fracture.  CT head is unremarkable.  Laceration repair performed, see procedure note.  I spoke with Dr. Neves who requests that we attempt to reduce it ourselves.  Dr. Oakes was  successful in reduction.  Repeat x-ray confirmed placement. Patient was closely monitored after sedation and returned to baseline. Patient able to transfer to chair. Close follow up with orthopedics and continue home PT. Discussed results with patient. Return precautions given. Based on my clinical evaluation, I do not appreciate any immediate, emergent, or life threatening condition or etiology that warrants additional workup today and feel that the patient can be discharged with close follow up care.  Patient is to follow up with their primary care provider. Case was discussed with Dr. Oakes who has evaluated the patient and is in agreement with the plan of care. All questions answered.          Scribe Attestation:   Scribe #1: I performed the above scribed service and the documentation accurately describes the services I performed. I attest to the accuracy of the note.    Attending Attestation:     Physician Attestation Statement for NP/PA:   I have conducted a face to face encounter with this patient in addition to the NP/PA, due to Medical Complexity    Other NP/PA Attestation Additions:    History of Present Illness: See ED course           I, Laura Tejeda PA-C, personally performed the services described in this documentation. All medical record entries made by the scribe were at my direction  and in my presence.  I have reviewed the chart and agree that the record reflects my personal performance and is accurate and complete. Laura Tejeda PA-C.  5:32 PM 12/17/2018          ED Course as of Dec 17 1731   Mon Dec 17, 2018   1434 85-year-old past medical history dementia, HTN, CAD/CABG 2008, CHF, ICMO with EF 35% presents today after fall with hip pain. Patient with left hip dislocation on x-ray.  [BD]   1436 VIOLETTA spoke to Dr. Neves who recommends reduction in the ED.  [BD]   1437 Family consented for procedural sedation and hip reduction. Hip reduced without complications and NVID after reduction. Lac repair by VIOLETTA. Will fu w ortho outpatient. [BD]      ED Course User Index  [BD] Ilya Oakes MD     Clinical Impression:   The primary encounter diagnosis was Hip dislocation, left. Diagnoses of Fall and Facial laceration, initial encounter were also pertinent to this visit.      Disposition:   Disposition: Discharged  Condition: Stable                        Laura Tejeda PA-C  12/17/18 1732       Ilya Oakes MD  12/18/18 0974       Ilya Oakes MD  12/18/18 0983

## 2018-12-17 NOTE — DISCHARGE INSTRUCTIONS
Have sutures removed in 5-7 days.   See handout on how to take care of the glue.  Follow up with Dr. Neves.  Return to the ER for any new or worsening symptoms.

## 2018-12-21 ENCOUNTER — TELEPHONE (OUTPATIENT)
Dept: ORTHOPEDICS | Facility: CLINIC | Age: 83
End: 2018-12-21

## 2018-12-21 ENCOUNTER — HOSPITAL ENCOUNTER (INPATIENT)
Facility: HOSPITAL | Age: 83
LOS: 12 days | Discharge: HOME HEALTH | DRG: 466 | End: 2019-01-02
Attending: EMERGENCY MEDICINE | Admitting: INTERNAL MEDICINE
Payer: MEDICARE

## 2018-12-21 DIAGNOSIS — R55 SYNCOPE: ICD-10-CM

## 2018-12-21 DIAGNOSIS — G30.9 ALZHEIMER'S DEMENTIA WITHOUT BEHAVIORAL DISTURBANCE, UNSPECIFIED TIMING OF DEMENTIA ONSET: ICD-10-CM

## 2018-12-21 DIAGNOSIS — I10 ESSENTIAL HYPERTENSION: ICD-10-CM

## 2018-12-21 DIAGNOSIS — I48.91 A-FIB: ICD-10-CM

## 2018-12-21 DIAGNOSIS — I50.22 CHRONIC SYSTOLIC HEART FAILURE: ICD-10-CM

## 2018-12-21 DIAGNOSIS — S73.005A DISLOCATION OF LEFT HIP, INITIAL ENCOUNTER: Primary | ICD-10-CM

## 2018-12-21 DIAGNOSIS — E44.0 MODERATE MALNUTRITION: ICD-10-CM

## 2018-12-21 DIAGNOSIS — F02.80 ALZHEIMER'S DEMENTIA WITHOUT BEHAVIORAL DISTURBANCE, UNSPECIFIED TIMING OF DEMENTIA ONSET: ICD-10-CM

## 2018-12-21 DIAGNOSIS — R00.0 TACHYCARDIA: ICD-10-CM

## 2018-12-21 DIAGNOSIS — S73.005A HIP DISLOCATION, LEFT, INITIAL ENCOUNTER: Primary | ICD-10-CM

## 2018-12-21 LAB
ANION GAP SERPL CALC-SCNC: 7 MMOL/L
BASOPHILS # BLD AUTO: 0.1 K/UL
BASOPHILS NFR BLD: 1.8 %
BUN SERPL-MCNC: 13 MG/DL
CALCIUM SERPL-MCNC: 8.2 MG/DL
CHLORIDE SERPL-SCNC: 109 MMOL/L
CO2 SERPL-SCNC: 22 MMOL/L
CREAT SERPL-MCNC: 0.8 MG/DL
CRP SERPL-MCNC: 101 MG/L
DIFFERENTIAL METHOD: ABNORMAL
EOSINOPHIL # BLD AUTO: 0.1 K/UL
EOSINOPHIL NFR BLD: 2.2 %
ERYTHROCYTE [DISTWIDTH] IN BLOOD BY AUTOMATED COUNT: 15.4 %
ERYTHROCYTE [SEDIMENTATION RATE] IN BLOOD BY WESTERGREN METHOD: 40 MM/HR
EST. GFR  (AFRICAN AMERICAN): >60 ML/MIN/1.73 M^2
EST. GFR  (NON AFRICAN AMERICAN): >60 ML/MIN/1.73 M^2
GLUCOSE SERPL-MCNC: 77 MG/DL
HCT VFR BLD AUTO: 30.5 %
HGB BLD-MCNC: 9.8 G/DL
LYMPHOCYTES # BLD AUTO: 1.1 K/UL
LYMPHOCYTES NFR BLD: 17.4 %
MCH RBC QN AUTO: 29 PG
MCHC RBC AUTO-ENTMCNC: 32.3 G/DL
MCV RBC AUTO: 90 FL
MONOCYTES # BLD AUTO: 0.5 K/UL
MONOCYTES NFR BLD: 8.6 %
NEUTROPHILS # BLD AUTO: 4.2 K/UL
NEUTROPHILS NFR BLD: 70 %
PLATELET # BLD AUTO: 343 K/UL
PMV BLD AUTO: 7.1 FL
POTASSIUM SERPL-SCNC: 4.2 MMOL/L
RBC # BLD AUTO: 3.39 M/UL
SODIUM SERPL-SCNC: 138 MMOL/L
WBC # BLD AUTO: 6.1 K/UL

## 2018-12-21 PROCEDURE — 25000003 PHARM REV CODE 250: Performed by: EMERGENCY MEDICINE

## 2018-12-21 PROCEDURE — 86140 C-REACTIVE PROTEIN: CPT

## 2018-12-21 PROCEDURE — 63600175 PHARM REV CODE 636 W HCPCS: Performed by: EMERGENCY MEDICINE

## 2018-12-21 PROCEDURE — 85651 RBC SED RATE NONAUTOMATED: CPT

## 2018-12-21 PROCEDURE — 85025 COMPLETE CBC W/AUTO DIFF WBC: CPT

## 2018-12-21 PROCEDURE — 99900035 HC TECH TIME PER 15 MIN (STAT)

## 2018-12-21 PROCEDURE — 12000002 HC ACUTE/MED SURGE SEMI-PRIVATE ROOM

## 2018-12-21 PROCEDURE — 94760 N-INVAS EAR/PLS OXIMETRY 1: CPT

## 2018-12-21 PROCEDURE — 99223 1ST HOSP IP/OBS HIGH 75: CPT | Mod: AI,,, | Performed by: INTERNAL MEDICINE

## 2018-12-21 PROCEDURE — 94770 HC EXHALED C02 TEST: CPT

## 2018-12-21 PROCEDURE — 99223 PR INITIAL HOSPITAL CARE,LEVL III: ICD-10-PCS | Mod: AI,,, | Performed by: INTERNAL MEDICINE

## 2018-12-21 PROCEDURE — 36415 COLL VENOUS BLD VENIPUNCTURE: CPT

## 2018-12-21 PROCEDURE — 80048 BASIC METABOLIC PNL TOTAL CA: CPT

## 2018-12-21 PROCEDURE — 27000221 HC OXYGEN, UP TO 24 HOURS

## 2018-12-21 PROCEDURE — 99285 EMERGENCY DEPT VISIT HI MDM: CPT | Mod: 25

## 2018-12-21 RX ORDER — DONEPEZIL HYDROCHLORIDE 5 MG/1
5 TABLET, FILM COATED ORAL NIGHTLY
Status: DISCONTINUED | OUTPATIENT
Start: 2018-12-21 | End: 2018-12-24

## 2018-12-21 RX ORDER — CARVEDILOL 6.25 MG/1
6.25 TABLET ORAL 2 TIMES DAILY WITH MEALS
Status: DISCONTINUED | OUTPATIENT
Start: 2018-12-21 | End: 2018-12-24

## 2018-12-21 RX ORDER — MORPHINE SULFATE 8 MG/ML
4 INJECTION INTRAMUSCULAR; INTRAVENOUS; SUBCUTANEOUS
Status: DISPENSED | OUTPATIENT
Start: 2018-12-21 | End: 2018-12-22

## 2018-12-21 RX ORDER — ACETAMINOPHEN 325 MG/1
650 TABLET ORAL EVERY 6 HOURS PRN
Status: DISCONTINUED | OUTPATIENT
Start: 2018-12-21 | End: 2018-12-24

## 2018-12-21 RX ORDER — AMLODIPINE BESYLATE 5 MG/1
5 TABLET ORAL 2 TIMES DAILY
Status: DISCONTINUED | OUTPATIENT
Start: 2018-12-21 | End: 2018-12-24

## 2018-12-21 RX ORDER — VALSARTAN 80 MG/1
160 TABLET ORAL DAILY
Status: DISCONTINUED | OUTPATIENT
Start: 2018-12-22 | End: 2018-12-24

## 2018-12-21 RX ORDER — ENOXAPARIN SODIUM 100 MG/ML
40 INJECTION SUBCUTANEOUS EVERY 12 HOURS
Status: DISCONTINUED | OUTPATIENT
Start: 2018-12-21 | End: 2018-12-21

## 2018-12-21 RX ORDER — TAMSULOSIN HYDROCHLORIDE 0.4 MG/1
0.4 CAPSULE ORAL DAILY
Status: DISCONTINUED | OUTPATIENT
Start: 2018-12-22 | End: 2018-12-24

## 2018-12-21 RX ORDER — ENOXAPARIN SODIUM 100 MG/ML
40 INJECTION SUBCUTANEOUS
Status: DISCONTINUED | OUTPATIENT
Start: 2018-12-21 | End: 2018-12-24

## 2018-12-21 RX ORDER — MORPHINE SULFATE 2 MG/ML
4 INJECTION, SOLUTION INTRAMUSCULAR; INTRAVENOUS EVERY 4 HOURS PRN
Status: DISCONTINUED | OUTPATIENT
Start: 2018-12-21 | End: 2018-12-24

## 2018-12-21 RX ORDER — POTASSIUM CHLORIDE 20 MEQ/1
20 TABLET, EXTENDED RELEASE ORAL DAILY
Status: DISCONTINUED | OUTPATIENT
Start: 2018-12-22 | End: 2018-12-24

## 2018-12-21 RX ORDER — PROPOFOL 10 MG/ML
45 VIAL (ML) INTRAVENOUS
Status: COMPLETED | OUTPATIENT
Start: 2018-12-21 | End: 2018-12-21

## 2018-12-21 RX ORDER — MEMANTINE HYDROCHLORIDE 5 MG/1
10 TABLET ORAL 2 TIMES DAILY
Status: DISCONTINUED | OUTPATIENT
Start: 2018-12-21 | End: 2018-12-24

## 2018-12-21 RX ORDER — QUETIAPINE FUMARATE 25 MG/1
25 TABLET, FILM COATED ORAL 2 TIMES DAILY
Status: DISCONTINUED | OUTPATIENT
Start: 2018-12-21 | End: 2018-12-24

## 2018-12-21 RX ORDER — ASPIRIN 325 MG
325 TABLET ORAL 2 TIMES DAILY
Status: DISCONTINUED | OUTPATIENT
Start: 2018-12-21 | End: 2018-12-24

## 2018-12-21 RX ORDER — ERGOCALCIFEROL 1.25 MG/1
50000 CAPSULE ORAL
Status: DISCONTINUED | OUTPATIENT
Start: 2018-12-22 | End: 2018-12-24

## 2018-12-21 RX ADMIN — MEMANTINE HYDROCHLORIDE 10 MG: 5 TABLET ORAL at 09:12

## 2018-12-21 RX ADMIN — PROPOFOL 45 MG: 10 INJECTION, EMULSION INTRAVENOUS at 05:12

## 2018-12-21 RX ADMIN — AMLODIPINE BESYLATE 5 MG: 5 TABLET ORAL at 09:12

## 2018-12-21 RX ADMIN — ASPIRIN 325 MG ORAL TABLET 325 MG: 325 PILL ORAL at 09:12

## 2018-12-21 RX ADMIN — DONEPEZIL HYDROCHLORIDE 5 MG: 5 TABLET, FILM COATED ORAL at 09:12

## 2018-12-21 RX ADMIN — QUETIAPINE FUMARATE 25 MG: 25 TABLET ORAL at 09:12

## 2018-12-21 RX ADMIN — CARVEDILOL 6.25 MG: 6.25 TABLET, FILM COATED ORAL at 09:12

## 2018-12-21 NOTE — ED NOTES
Pt in room 6 for evaluation of left hip pain. Pt took brace off and left hip dislocated again. Pt is awake and alert - confused about why he is in ED.  Resp even and unlabored. Abd soft and non-tender. Bruising noted to right side of face around eye and abrasion noted to right eyebrow from fall.  Pt denies chest pain or sob.

## 2018-12-21 NOTE — ED PROVIDER NOTES
Encounter Date: 12/21/2018    SCRIBE #1 NOTE: Jahaira ARRIOLA, am scribing for, and in the presence of, Dr. De La Cruz.       History     Chief Complaint   Patient presents with    Hip Injury     C/o dislocated left hip.      12/21/2018  2:59 PM     The patient is a 85 y.o. male  who presents with hip pain. Patient had a left hip arthroplasty secondary to a femoral neck fracture. He was evaluated on 12/17/2018 and yesterday for left hip dislocation. The dislocation was reduced in the ED and he was placed in a abduction brace. The plan was to have a repair for instability done after [place]. Pt states he does not know why he is here. The patient's family states while attempting to sit the patient up, he complained of acute left hip pain. Patient is a poor historian. PMHx of HTN, CAD, CHF. SHx of cardiac surgery, left hemiarthroplasty of hip.        The history is provided by the patient and a relative.     Review of patient's allergies indicates:  No Known Allergies  Past Medical History:   Diagnosis Date    CHF (congestive heart failure)     Coronary artery disease     Hypertension      Past Surgical History:   Procedure Laterality Date    CARDIAC SURGERY      cabg    HEMIARTHROPLASTY, HIP Left 10/29/2018    Performed by Brando Neves MD at [facility] OR     Family History   Problem Relation Age of Onset    Heart disease Mother     Heart disease Father     Heart attack Sister     Hypertension Brother     Stroke Brother      Social History     Tobacco Use    Smoking status: Never Smoker    Smokeless tobacco: Never Used   Substance Use Topics    Alcohol use: No    Drug use: No     Review of Systems   Constitutional: Negative for appetite change, chills and fever.   HENT: Negative for congestion, rhinorrhea and sore throat.    Respiratory: Negative for cough and shortness of breath.    Cardiovascular: Negative for chest pain.   Gastrointestinal: Negative for abdominal pain, diarrhea, nausea and vomiting.    Genitourinary: Negative for dysuria.   Musculoskeletal: Positive for arthralgias (left hip pain). Negative for back pain and myalgias.   Skin: Negative for rash.   Neurological: Negative for weakness and numbness.   Hematological: Does not bruise/bleed easily.       Physical Exam     Initial Vitals [12/21/18 1457]   BP Pulse Resp Temp SpO2   (!) 154/68 79 16 99 °F (37.2 °C) 95 %      MAP       --         Physical Exam    Nursing note and vitals reviewed.  Constitutional: No distress.   HENT:   Head: Normocephalic.   Mouth/Throat: Mucous membranes are normal.   Old ecchymosis to the right periorbital region with sutured right glabella laceration.   Eyes: EOM are normal. Pupils are equal, round, and reactive to light.   Neck: Normal range of motion.   Cardiovascular: Normal rate, regular rhythm, normal heart sounds and intact distal pulses. Exam reveals no gallop and no friction rub.    No murmur heard.  Pulmonary/Chest: Breath sounds normal. He has no wheezes. He has no rhonchi. He has no rales.   Abdominal: Soft. He exhibits no distension. There is no tenderness.   Musculoskeletal: Normal range of motion. He exhibits no edema.   Neurological: He is alert and oriented to person, place, and time.   Cranial nerves 3-12 are intact. Normal strength and sensation.   Skin: Skin is dry.   Small right subungual laceration.         ED Course   Procedures  Labs Reviewed   CBC W/ AUTO DIFFERENTIAL - Abnormal; Notable for the following components:       Result Value    RBC 3.39 (*)     Hemoglobin 9.8 (*)     Hematocrit 30.5 (*)     RDW 15.4 (*)     MPV 7.1 (*)     Lymph% 17.4 (*)     All other components within normal limits   BASIC METABOLIC PANEL - Abnormal; Notable for the following components:    CO2 22 (*)     Calcium 8.2 (*)     Anion Gap 7 (*)     All other components within normal limits   SEDIMENTATION RATE - Abnormal; Notable for the following components:    Sed Rate 40 (*)     All other components within normal  limits   C-REACTIVE PROTEIN - Abnormal; Notable for the following components:    .0 (*)     All other components within normal limits          Imaging Results          X-Ray Hip 1 View Left (Final result)  Result time 12/22/18 06:38:59   Procedure changed from X-Ray Hip 2 or 3 views Left     Final result by Angel Flores MD (12/22/18 06:38:59)                 Impression:      Interval reduction of the superolateral dislocation.      Electronically signed by: Angel Flores  Date:    12/22/2018  Time:    06:38             Narrative:    EXAMINATION:  XR HIP 1 VIEW LEFT    CLINICAL HISTORY:  Left hip dislocation s/p closed reduction;    TECHNIQUE:  Single limited AP view of the left hip.    COMPARISON:  Plain films left hip earlier same day.    FINDINGS:  Interval reduction of the superolateral dislocation.  Prosthetic femoral head normally positioned within the native acetabulum.  No linear lucency to suggest an acute fracture.                               X-Ray Hip 2 or 3 views Left (Final result)  Result time 12/21/18 15:40:18    Final result by Risa Grant MD (12/21/18 15:40:18)                 Impression:      Left hip dislocation      Electronically signed by: Risa Grant MD  Date:    12/21/2018  Time:    15:40             Narrative:    EXAMINATION:  XR HIP 2 VIEW LEFT    CLINICAL HISTORY:  Left hip pain;    TECHNIQUE:  AP view of the pelvis and frog leg lateral view of the left hip were performed.    COMPARISON:  12/20/2018 13:28 hours    FINDINGS:  Exam time: 15:16 hours    There is prosthetic left femoral head with endoprosthesis extending into the proximal femoral shaft.  The prosthetic femoral head is dislocated cephalad to,  out of the acetabulum.                            (radiology reading, visualized by me)    Closed reduction of left hip dislocation: Consent obtained from family regarding risks, alternatives, and benefits prior to procedure.  The patient was restrained against  the bed with traction applied to hip via Captain Nestor technique with easy reduction both palpable and visible.  No change in distal neurovascular status following procedure confirmed by x-ray.     Medical Decision Making:   History:   Old Medical Records: I decided to obtain old medical records.  Clinical Tests:   Radiological Study: Ordered and Reviewed            Scribe Attestation:   Scribe #1: I performed the above scribed service and the documentation accurately describes the services I performed. I attest to the accuracy of the note.    I, Dr. Martinez De La Cruz, personally performed the services described in this documentation. All medical record entries made by the scribe were at my direction and in my presence.  I have reviewed the chart and agree that the record reflects my personal performance and is accurate and complete. Martinez De La Cruz MD.  6:40 PM 12/22/2018    Adrian Osorio is a 85 y.o. male presenting with a left hip dislocation recurrent.  He is notably had multiple visits for the same issue despite abducted brace suggested at most recent visit.  I do suspect some degree of noncompliance with brace but think this may be impractical secondary to patient's dementia.  I did speak with Dr. Neves recommends admission for likely revision to replacement.  Closed reduction performed here.  Patient remains distally neurovascularly intact. Inflammatory markers ordered at Dr. Neves's request and are in part elevated.  I did discuss with Dr. vanegas from the hospitalist service and will admit with ID consultation.  No empiric antibiotics at this time per our discussion with consideration of chronic joint infection.  No sign of sepsis.        ED Course as of Dec 22 0715   Fri Dec 21, 2018   1530 XR L hip:  Superior L hip dislocation. (my read)  [MR]   1531 Call placed to Dr. Neves re: likely repeat dislocation. (my read)  [MR]   1716 Deep Procedural Sedation:  Written consent was obtained from  the patient or family as applicable prior to the start of the procedure.  Determined prior to start of procedure:    --Last solid or liquid oral intake (NPO status):  6 hours  --ASA Class:  2 (1-healthy, 2-mild systemic disease, 3-severe systemic disease, 4-severe disease that is a constant threat to life, 5-moribund and will not survive without procedure, 6-brain death)  --Mallampati:  1 (1-soft palate, uvula, fauces, pillars; 2-soft palate, uvula, fauces; 3-soft palate, base of uvula; 4-hard palate only)  Equipment employed includes continuous cardiac monitoring, pulse oximetry, capnometry. Continuous supplemental oxygen was provided. Airway equipment including bag-valve-mask was available at the bedside.  Sedation agents administered by MD were propofol 45 mg IV  Start time:  1702  End time:  1712  Vital signs were continuously monitored without occurrence of hypoxia or hypotension.  There were no recognized complications and the patient tolerated the sedation well.  [MR]   1801 L hip XR:  Reduction of dislocation. (my read)  [MR]   1933 Signed over from Dr. De La Cruz pending admission  [EF]      ED Course User Index  [EF] Wilber Baldwin MD  [MR] Martinez De La Cruz MD     Clinical Impression:   The encounter diagnosis was Hip dislocation, left, initial encounter.                             Martinez De La Cruz MD  12/21/18 1843       Martinez De La Cruz MD  12/22/18 0762

## 2018-12-22 PROBLEM — F02.80 ALZHEIMER'S DEMENTIA WITHOUT BEHAVIORAL DISTURBANCE: Status: ACTIVE | Noted: 2018-10-28

## 2018-12-22 LAB — PROCALCITONIN SERPL IA-MCNC: 0.08 NG/ML

## 2018-12-22 PROCEDURE — 36415 COLL VENOUS BLD VENIPUNCTURE: CPT

## 2018-12-22 PROCEDURE — 63600175 PHARM REV CODE 636 W HCPCS: Performed by: INTERNAL MEDICINE

## 2018-12-22 PROCEDURE — 84145 PROCALCITONIN (PCT): CPT

## 2018-12-22 PROCEDURE — 12000002 HC ACUTE/MED SURGE SEMI-PRIVATE ROOM

## 2018-12-22 PROCEDURE — 25000003 PHARM REV CODE 250: Performed by: INTERNAL MEDICINE

## 2018-12-22 PROCEDURE — 94761 N-INVAS EAR/PLS OXIMETRY MLT: CPT

## 2018-12-22 PROCEDURE — 63600175 PHARM REV CODE 636 W HCPCS: Performed by: EMERGENCY MEDICINE

## 2018-12-22 PROCEDURE — 27000221 HC OXYGEN, UP TO 24 HOURS

## 2018-12-22 PROCEDURE — 25000003 PHARM REV CODE 250: Performed by: EMERGENCY MEDICINE

## 2018-12-22 RX ORDER — POLYETHYLENE GLYCOL 3350 17 G/17G
17 POWDER, FOR SOLUTION ORAL DAILY PRN
Status: DISCONTINUED | OUTPATIENT
Start: 2018-12-22 | End: 2018-12-24

## 2018-12-22 RX ORDER — OXYCODONE HYDROCHLORIDE 5 MG/1
5 TABLET ORAL EVERY 4 HOURS PRN
Status: DISCONTINUED | OUTPATIENT
Start: 2018-12-22 | End: 2018-12-24

## 2018-12-22 RX ADMIN — POTASSIUM CHLORIDE 20 MEQ: 20 TABLET, EXTENDED RELEASE ORAL at 08:12

## 2018-12-22 RX ADMIN — CARVEDILOL 6.25 MG: 6.25 TABLET, FILM COATED ORAL at 05:12

## 2018-12-22 RX ADMIN — MORPHINE SULFATE 4 MG: 2 INJECTION, SOLUTION INTRAMUSCULAR; INTRAVENOUS at 09:12

## 2018-12-22 RX ADMIN — OXYCODONE HYDROCHLORIDE 5 MG: 5 TABLET ORAL at 01:12

## 2018-12-22 RX ADMIN — TAMSULOSIN HYDROCHLORIDE 0.4 MG: 0.4 CAPSULE ORAL at 08:12

## 2018-12-22 RX ADMIN — CARVEDILOL 6.25 MG: 6.25 TABLET, FILM COATED ORAL at 07:12

## 2018-12-22 RX ADMIN — OXYCODONE HYDROCHLORIDE 5 MG: 5 TABLET ORAL at 10:12

## 2018-12-22 RX ADMIN — ERGOCALCIFEROL 50000 UNITS: 1.25 CAPSULE ORAL at 08:12

## 2018-12-22 RX ADMIN — ASPIRIN 325 MG ORAL TABLET 325 MG: 325 PILL ORAL at 08:12

## 2018-12-22 RX ADMIN — MEMANTINE HYDROCHLORIDE 10 MG: 5 TABLET ORAL at 08:12

## 2018-12-22 RX ADMIN — ENOXAPARIN SODIUM 40 MG: 100 INJECTION SUBCUTANEOUS at 05:12

## 2018-12-22 RX ADMIN — MORPHINE SULFATE 4 MG: 2 INJECTION, SOLUTION INTRAMUSCULAR; INTRAVENOUS at 07:12

## 2018-12-22 RX ADMIN — AMLODIPINE BESYLATE 5 MG: 5 TABLET ORAL at 08:12

## 2018-12-22 RX ADMIN — VALSARTAN 160 MG: 80 TABLET ORAL at 08:12

## 2018-12-22 RX ADMIN — QUETIAPINE FUMARATE 25 MG: 25 TABLET ORAL at 08:12

## 2018-12-22 RX ADMIN — DONEPEZIL HYDROCHLORIDE 5 MG: 5 TABLET, FILM COATED ORAL at 08:12

## 2018-12-22 NOTE — PROGRESS NOTES
Pharmacist Renal Dose Adjustment Note    Adrian Osorio is a 85 y.o. male being treated with the medication Enoxaparin     Patient Data:    Vital Signs (Most Recent):  Temp: 98.3 °F (36.8 °C) (12/21/18 1940)  Pulse: 71 (12/21/18 1940)  Resp: 18 (12/21/18 1940)  BP: (!) 143/67 (12/21/18 1940)  SpO2: (!) 92 % (12/21/18 1940)   Vital Signs (72h Range):  Temp:  [97.3 °F (36.3 °C)-99 °F (37.2 °C)]   Pulse:  [63-86]   Resp:  [16-20]   BP: (112-199)/(52-94)   SpO2:  [85 %-100 %]      Recent Labs   Lab 12/17/18  1246 12/21/18  1608   CREATININE 0.9 0.8     Serum creatinine: 0.8 mg/dL 12/21/18 1608  Estimated creatinine clearance: 80.5 mL/min    Medication: enoxaparin  dose: m40g frequency q12h will be changed to medication: dose: 40mg frequency: daily    Pharmacist's Name: Daniel Nelson

## 2018-12-22 NOTE — PLAN OF CARE
12/22/18 1111   PRE-TX-O2-ETCO2   O2 Device (Oxygen Therapy) nasal cannula   $ Is the patient on Low Flow Oxygen? Yes   Flow (L/min) 2   Oxygen Concentration (%) 28   SpO2 96 %   Pulse Oximetry Type Intermittent   $ Pulse Oximetry - Multiple Charge Pulse Oximetry - Multiple   Ready to Wean/Extubation Screen   FIO2<=50 (chart decimal) 0.28

## 2018-12-22 NOTE — PLAN OF CARE
12/21/18 9352   Patient Assessment/Suction   Level of Consciousness (AVPU) alert   PRE-TX-O2-ETCO2   O2 Device (Oxygen Therapy) nasal cannula   $ Is the patient on Low Flow Oxygen? Yes   Flow (L/min) 2   Oxygen Concentration (%) 28   SpO2 96 %   Pulse Oximetry Type Intermittent   $ Pulse Oximetry - Single Charge Pulse Oximetry - Single   Ready to Wean/Extubation Screen   FIO2<=50 (chart decimal) 0.28

## 2018-12-22 NOTE — H&P
[facility] Hospital Medicine  History & Physical    Patient Name: Adrian Osorio  MRN: [id number]  Admission Date: 12/21/2018  Attending Physician: Pascale Nash MD   Primary Care Provider: Martín Perales MD         Patient information was obtained from past medical records and ER records.     Subjective:     Principal Problem:Hip dislocation, left, initial encounter    Chief Complaint:   Chief Complaint   Patient presents with    Hip Injury     C/o dislocated left hip.         HPI: Mr. 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 He presents to the ED with c/o left hip pain. Patient had a left hip arthroplasty secondary to a femoral neck fracture in October 2018. He was evaluated on 12/17/2018 and yesterday for left hip dislocation. The dislocation was reduced in the ED and he was placed in a abduction brace. The plan was to have a repair for instability done after [place].  He returns today with again L hip dislocation.  He is admitted to the service of hospital medicine and will be evaluated by the orthopedic surgeon.  Pt is a poor historian; information is obtained from ED and past medical records.    Past Medical History:   Diagnosis Date    CHF (congestive heart failure)     Coronary artery disease     Hypertension        Past Surgical History:   Procedure Laterality Date    CARDIAC SURGERY      cabg    HEMIARTHROPLASTY, HIP Left 10/29/2018    Performed by Brando Neves MD at [facility] OR       Review of patient's allergies indicates:  No Known Allergies    Current Facility-Administered Medications on File Prior to Encounter   Medication    [DISCONTINUED] GENERIC EXTERNAL MEDICATION     Current Outpatient Medications on File Prior to Encounter   Medication Sig    amLODIPine (NORVASC) 5 MG tablet Take 5 mg by mouth 2 (two) times daily.    aspirin 325 MG tablet Take 1 tablet (325 mg total) by mouth 2 (two) times daily. for 21  days    carvedilol (COREG) 6.25 MG tablet Take 6.25 mg by mouth 2 (two) times daily with meals.      donepezil (ARICEPT) 5 MG tablet Take 5 mg by mouth every evening.    ergocalciferol (ERGOCALCIFEROL) 50,000 unit Cap Take 50,000 Units by mouth every 14 (fourteen) days.    memantine (NAMENDA) 10 MG tablet Take 10 mg by mouth 2 (two) times daily.      potassium chloride SA (K-DUR,KLOR-CON) 20 MEQ tablet Take 20 mEq by mouth once daily.     QUEtiapine (SEROQUEL) 25 MG Tab Take 25 mg by mouth 2 (two) times daily. Take 1-2 tablets by mouth 2 times daily    tamsulosin (FLOMAX) 0.4 mg Cap Take 0.4 mg by mouth once daily.    valsartan (DIOVAN) 160 MG tablet Take 160 mg by mouth once daily.     Family History     Problem Relation (Age of Onset)    Heart attack Sister    Heart disease Mother, Father    Hypertension Brother    Stroke Brother        Tobacco Use    Smoking status: Never Smoker    Smokeless tobacco: Never Used   Substance and Sexual Activity    Alcohol use: No    Drug use: No    Sexual activity: Not Currently     Review of Systems   Constitutional: Negative for appetite change, chills, fatigue and fever.   HENT: Negative for sore throat and trouble swallowing.         Hard of hearing   Eyes: Negative for photophobia and visual disturbance.   Respiratory: Negative for cough, shortness of breath and wheezing.    Cardiovascular: Negative for chest pain and palpitations.   Gastrointestinal: Negative for abdominal pain, diarrhea, nausea and vomiting.   Endocrine: Negative for polyphagia and polyuria.   Genitourinary: Negative for dysuria and flank pain.   Musculoskeletal: Positive for arthralgias (left hip) and gait problem.   Skin: Negative for rash and wound.   Neurological: Negative for dizziness and weakness.   Psychiatric/Behavioral: Positive for confusion (mild). Negative for agitation.   All other systems reviewed and are negative.    Objective:     Vital Signs (Most Recent):  Temp: 98.3 °F (36.8  °C) (12/21/18 1940)  Pulse: 71 (12/21/18 1940)  Resp: 18 (12/21/18 1940)  BP: (!) 143/67 (12/21/18 1940)  SpO2: 96 % (12/21/18 2145) Vital Signs (24h Range):  Temp:  [98.3 °F (36.8 °C)-99 °F (37.2 °C)] 98.3 °F (36.8 °C)  Pulse:  [63-81] 71  Resp:  [16-18] 18  SpO2:  [92 %-99 %] 96 %  BP: (143-199)/(67-91) 143/67     Weight: 94.3 kg (208 lb)  Body mass index is 28.21 kg/m².    Physical Exam   Constitutional: He appears well-developed and well-nourished.   HENT:   Head: Normocephalic and atraumatic.   Eyes: Conjunctivae and EOM are normal. Pupils are equal, round, and reactive to light.   Neck: Normal range of motion. Neck supple. No JVD present.   Cardiovascular: Normal rate, regular rhythm, normal heart sounds and intact distal pulses.   Pulmonary/Chest: Effort normal and breath sounds normal. No stridor. No respiratory distress.   Abdominal: Soft. Bowel sounds are normal. He exhibits no distension. There is no tenderness.   Genitourinary:   Genitourinary Comments: deferred   Musculoskeletal: He exhibits tenderness (left hip).   Immobilizer in use to L hip   Neurological: He is alert.   Mild confusion   Skin: Skin is warm and dry. Capillary refill takes 2 to 3 seconds.   Psychiatric: He has a normal mood and affect. His behavior is normal. Judgment normal.   Vitals reviewed.        CRANIAL NERVES     CN III, IV, VI   Pupils are equal, round, and reactive to light.  Extraocular motions are normal.        Significant Labs:   CBC:   Recent Labs   Lab 12/21/18  1608   WBC 6.10   HGB 9.8*   HCT 30.5*        CMP:   Recent Labs   Lab 12/21/18  1608      K 4.2      CO2 22*   GLU 77   BUN 13   CREATININE 0.8   CALCIUM 8.2*   ANIONGAP 7*   EGFRNONAA >60       Significant Imaging: Xray L hip: Left hip dislocation    Assessment/Plan:     * Hip dislocation, left, initial encounter    NPO  Consult orthopedics  Hip Precautions  Pain management  PT/OT consult   for discharge planning       Alzheimer's  dementia without behavioral disturbance    Stable. Continue Namenda, Aricept, and Seroquel.  Delirium precautions.  Fall precautions       Chronic systolic heart failure/ ICMO EF 40%    Stable.  Continue BB, diuretic, and ARB  Monitor on telemetry  Weigh daily  Last Echo 10/24/18: The estimated LV ejection fraction is 40 %. LV systolic function is mildly to moderately reduced. Diastolic function is indeterminate.       Essential hypertension    Chronic/Controlled. Continue chronic medications, adjusting as needed.         VTE Risk Mitigation (From admission, onward)        Ordered     IP VTE HIGH RISK PATIENT  Once      12/21/18 1938     enoxaparin injection 40 mg  Every 24 hours (non-standard times)      12/21/18 2043      Time spent seeing patient( greater than 1/2 spent in direct contact) : 45 minutes.       MICHAEL Higgins  Department of Hospital Medicine   [facility]

## 2018-12-22 NOTE — PROGRESS NOTES
[facility] Hospital Medicine  Progress Note    Patient Name: Adrian Osorio  MRN: [id number]  Patient Class: IP- Inpatient   Admission Date: 12/21/2018  Length of Stay: 1 days  Attending Physician: Pascale Nash MD  Primary Care Provider: Martín Perales MD        Subjective:     Principal Problem:Hip dislocation, left, initial encounter    HPI:  Mr. Osorio is an 84yo M with a PMH of Dementia, HTN, CAD/CABG 2008, CHF, ICMO with EF 35%. He presents to the ED with c/o left hip pain. Patient had a left hip arthroplasty secondary to a femoral neck fracture in October 2018. He was evaluated on 12/17/2018 and yesterday for left hip dislocation. The dislocation was reduced in the ED and he was placed in a abduction brace. The plan was to have a repair for instability done after [place].  He returns today with again L hip dislocation.  He is admitted to the service of hospital medicine and will be evaluated by the orthopedic surgeon.  Pt is a poor historian; information is obtained from ED and past medical records.            Hospital Course:  No notes on file    Interval History: The pt has dementia and is a limited historian. He is having hip pain    Review of Systems   Unable to perform ROS: Dementia     Objective:     Vital Signs (Most Recent):  Temp: 98.4 °F (36.9 °C) (12/22/18 0858)  Pulse: 73 (12/22/18 0759)  Resp: 20 (12/22/18 0759)  BP: (!) 151/69 (12/22/18 0759)  SpO2: 96 % (12/22/18 1111) Vital Signs (24h Range):  Temp:  [97.9 °F (36.6 °C)-99 °F (37.2 °C)] 98.4 °F (36.9 °C)  Pulse:  [63-81] 73  Resp:  [16-20] 20  SpO2:  [92 %-99 %] 96 %  BP: (120-199)/(56-91) 151/69     Weight: 94.3 kg (208 lb)  Body mass index is 28.21 kg/m².    Intake/Output Summary (Last 24 hours) at 12/22/2018 1209  Last data filed at 12/22/2018 0900  Gross per 24 hour   Intake 200 ml   Output 400 ml   Net -200 ml      Physical Exam   HENT:   Head: Normocephalic and atraumatic.   Several bruises on his  face   Eyes: Conjunctivae and EOM are normal. Pupils are equal, round, and reactive to light.   Neck: Normal range of motion. Neck supple. No JVD present.   Cardiovascular: Normal rate, regular rhythm, normal heart sounds and intact distal pulses.   Pulmonary/Chest: Effort normal and breath sounds normal. No stridor. No respiratory distress.   Abdominal: Soft. Bowel sounds are normal. He exhibits no distension. There is no tenderness.   Genitourinary:   Genitourinary Comments: deferred   Musculoskeletal: He exhibits tenderness (left hip). He exhibits no edema.   Immobilizer in use to L hip   Neurological: He is alert. No cranial nerve deficit or sensory deficit.   Mild confusion   Skin: Skin is warm and dry. Capillary refill takes 2 to 3 seconds.   Psychiatric: He has a normal mood and affect. His behavior is normal. Judgment normal.   Vitals reviewed.      Significant Labs:   BMP:   Recent Labs   Lab 12/21/18  1608   GLU 77      K 4.2      CO2 22*   BUN 13   CREATININE 0.8   CALCIUM 8.2*     CBC:   Recent Labs   Lab 12/21/18  1608   WBC 6.10   HGB 9.8*   HCT 30.5*          Significant Imaging: I have reviewed all pertinent imaging results/findings within the past 24 hours.    Assessment/Plan:      * Hip dislocation, left, initial encounter    NPO  Consulted orthopedics  Hip Precautions  Pain management  PT/OT consult   for discharge planning       Essential hypertension    Chronic/Controlled. Continue chronic medications, adjusting as needed.       Chronic systolic heart failure/ ICMO EF 40%    Stable.  Continue BB, diuretic, and ARB  Last Echo 10/24/18: The estimated LV ejection fraction is 40 %. LV systolic function is mildly to moderately reduced. Diastolic function is indeterminate.       Alzheimer's dementia without behavioral disturbance    Stable. Continue Namenda, Aricept, and Seroquel.  Delirium precautions.  Fall precautions         VTE Risk Mitigation (From admission,  onward)        Ordered     IP VTE HIGH RISK PATIENT  Once      12/21/18 1938     enoxaparin injection 40 mg  Every 24 hours (non-standard times)      12/21/18 2043              Petra Mccollum MD  Department of Hospital Medicine   [facility]

## 2018-12-22 NOTE — ASSESSMENT & PLAN NOTE
NPO  Consulted orthopedics  Hip Precautions  Pain management  PT/OT consult   for discharge planning

## 2018-12-22 NOTE — SUBJECTIVE & OBJECTIVE
Interval History: The pt has dementia and is a limited historian. He is having hip pain    Review of Systems   Unable to perform ROS: Dementia     Objective:     Vital Signs (Most Recent):  Temp: 98.4 °F (36.9 °C) (12/22/18 0858)  Pulse: 73 (12/22/18 0759)  Resp: 20 (12/22/18 0759)  BP: (!) 151/69 (12/22/18 0759)  SpO2: 96 % (12/22/18 1111) Vital Signs (24h Range):  Temp:  [97.9 °F (36.6 °C)-99 °F (37.2 °C)] 98.4 °F (36.9 °C)  Pulse:  [63-81] 73  Resp:  [16-20] 20  SpO2:  [92 %-99 %] 96 %  BP: (120-199)/(56-91) 151/69     Weight: 94.3 kg (208 lb)  Body mass index is 28.21 kg/m².    Intake/Output Summary (Last 24 hours) at 12/22/2018 1209  Last data filed at 12/22/2018 0900  Gross per 24 hour   Intake 200 ml   Output 400 ml   Net -200 ml      Physical Exam   HENT:   Head: Normocephalic and atraumatic.   Several bruises on his face   Eyes: Conjunctivae and EOM are normal. Pupils are equal, round, and reactive to light.   Neck: Normal range of motion. Neck supple. No JVD present.   Cardiovascular: Normal rate, regular rhythm, normal heart sounds and intact distal pulses.   Pulmonary/Chest: Effort normal and breath sounds normal. No stridor. No respiratory distress.   Abdominal: Soft. Bowel sounds are normal. He exhibits no distension. There is no tenderness.   Genitourinary:   Genitourinary Comments: deferred   Musculoskeletal: He exhibits tenderness (left hip). He exhibits no edema.   Immobilizer in use to L hip   Neurological: He is alert. No cranial nerve deficit or sensory deficit.   Mild confusion   Skin: Skin is warm and dry. Capillary refill takes 2 to 3 seconds.   Psychiatric: He has a normal mood and affect. His behavior is normal. Judgment normal.   Vitals reviewed.      Significant Labs:   BMP:   Recent Labs   Lab 12/21/18  1608   GLU 77      K 4.2      CO2 22*   BUN 13   CREATININE 0.8   CALCIUM 8.2*     CBC:   Recent Labs   Lab 12/21/18  1608   WBC 6.10   HGB 9.8*   HCT 30.5*           Significant Imaging: I have reviewed all pertinent imaging results/findings within the past 24 hours.

## 2018-12-22 NOTE — HPI
POD #9 S/P L KAYLEE with constrained linear.  - No changes  - Awaiting SNF placement  - PT notes were reviewed and the patient's functional mobility is improving significantly

## 2018-12-22 NOTE — ASSESSMENT & PLAN NOTE
NPO  Consult orthopedics  Hip Precautions  Pain management  PT/OT consult   for discharge planning

## 2018-12-22 NOTE — SUBJECTIVE & OBJECTIVE
Past Medical History:   Diagnosis Date    CHF (congestive heart failure)     Coronary artery disease     Hypertension        Past Surgical History:   Procedure Laterality Date    CARDIAC SURGERY      cabg    HEMIARTHROPLASTY, HIP Left 10/29/2018    Performed by Brando Neves MD at [facility] OR       Review of patient's allergies indicates:  No Known Allergies    Current Facility-Administered Medications   Medication    acetaminophen tablet 650 mg    amLODIPine tablet 5 mg    aspirin tablet 325 mg    carvedilol tablet 6.25 mg    donepezil tablet 5 mg    enoxaparin injection 40 mg    ergocalciferol capsule 50,000 Units    memantine tablet 10 mg    morphine injection 4 mg    potassium chloride SA CR tablet 20 mEq    QUEtiapine tablet 25 mg    tamsulosin 24 hr capsule 0.4 mg    valsartan tablet 160 mg     Family History     Problem Relation (Age of Onset)    Heart attack Sister    Heart disease Mother, Father    Hypertension Brother    Stroke Brother        Tobacco Use    Smoking status: Never Smoker    Smokeless tobacco: Never Used   Substance and Sexual Activity    Alcohol use: No    Drug use: No    Sexual activity: Not Currently     Review of Systems   Constitution: Negative for chills and decreased appetite.   HENT: Positive for hearing loss.    Eyes: Negative for double vision.   Cardiovascular: Negative for chest pain.   Respiratory: Negative for cough and shortness of breath.    Gastrointestinal: Negative for bloating and abdominal pain.     Objective:     Vital Signs (Most Recent):  Temp: 98.4 °F (36.9 °C) (12/22/18 0759)  Pulse: 73 (12/22/18 0759)  Resp: 20 (12/22/18 0759)  BP: (!) 151/69 (12/22/18 0759)  SpO2: 96 % (12/22/18 0759) Vital Signs (24h Range):  Temp:  [97.9 °F (36.6 °C)-99 °F (37.2 °C)] 98.4 °F (36.9 °C)  Pulse:  [63-81] 73  Resp:  [16-20] 20  SpO2:  [92 %-99 %] 96 %  BP: (120-199)/(56-91) 151/69     Weight: 94.3 kg (208 lb)  Height: 6' (182.9 cm)  Body mass index is 28.21  kg/m².    No intake or output data in the 24 hours ending 12/22/18 0852    General    Nursing note and vitals reviewed.  Constitutional: He is oriented to person, place, and time. He appears well-developed and well-nourished.   Pulmonary/Chest: Effort normal.   Abdominal: Soft. Bowel sounds are normal.   Neurological: He is alert and oriented to person, place, and time.   Psychiatric: He has a normal mood and affect. His behavior is normal.         Left Hip Exam     Comments:  LLE DNVI. Brace in place. Resting supine with L hip IR            Significant Labs:   CBC:   Recent Labs   Lab 12/21/18  1608   WBC 6.10   HGB 9.8*   HCT 30.5*        CMP:   Recent Labs   Lab 12/21/18  1608      K 4.2      CO2 22*   GLU 77   BUN 13   CREATININE 0.8   CALCIUM 8.2*   ANIONGAP 7*   EGFRNONAA >60     All pertinent labs within the past 24 hours have been reviewed.    Significant Imaging: X-Ray: I have reviewed all pertinent results/findings and my personal findings are:  Post-reduction L hip X-rays: Interval reduction of the superolateral dislocation.  Prosthetic femoral head normally positioned within the native acetabulum.  No linear lucency to suggest an acute fracture.

## 2018-12-22 NOTE — ASSESSMENT & PLAN NOTE
Stable.  Continue BB, diuretic, and ARB  Last Echo 10/24/18: The estimated LV ejection fraction is 40 %. LV systolic function is mildly to moderately reduced. Diastolic function is indeterminate.

## 2018-12-22 NOTE — SUBJECTIVE & OBJECTIVE
Past Medical History:   Diagnosis Date    CHF (congestive heart failure)     Coronary artery disease     Hypertension        Past Surgical History:   Procedure Laterality Date    CARDIAC SURGERY      cabg    HEMIARTHROPLASTY, HIP Left 10/29/2018    Performed by Brando Neves MD at [facility] OR       Review of patient's allergies indicates:  No Known Allergies    Current Facility-Administered Medications on File Prior to Encounter   Medication    [DISCONTINUED] GENERIC EXTERNAL MEDICATION     Current Outpatient Medications on File Prior to Encounter   Medication Sig    amLODIPine (NORVASC) 5 MG tablet Take 5 mg by mouth 2 (two) times daily.    aspirin 325 MG tablet Take 1 tablet (325 mg total) by mouth 2 (two) times daily. for 21 days    carvedilol (COREG) 6.25 MG tablet Take 6.25 mg by mouth 2 (two) times daily with meals.      donepezil (ARICEPT) 5 MG tablet Take 5 mg by mouth every evening.    ergocalciferol (ERGOCALCIFEROL) 50,000 unit Cap Take 50,000 Units by mouth every 14 (fourteen) days.    memantine (NAMENDA) 10 MG tablet Take 10 mg by mouth 2 (two) times daily.      potassium chloride SA (K-DUR,KLOR-CON) 20 MEQ tablet Take 20 mEq by mouth once daily.     QUEtiapine (SEROQUEL) 25 MG Tab Take 25 mg by mouth 2 (two) times daily. Take 1-2 tablets by mouth 2 times daily    tamsulosin (FLOMAX) 0.4 mg Cap Take 0.4 mg by mouth once daily.    valsartan (DIOVAN) 160 MG tablet Take 160 mg by mouth once daily.     Family History     Problem Relation (Age of Onset)    Heart attack Sister    Heart disease Mother, Father    Hypertension Brother    Stroke Brother        Tobacco Use    Smoking status: Never Smoker    Smokeless tobacco: Never Used   Substance and Sexual Activity    Alcohol use: No    Drug use: No    Sexual activity: Not Currently     Review of Systems   Constitutional: Negative for appetite change, chills, fatigue and fever.   HENT: Negative for sore throat and trouble swallowing.          Hard of hearing   Eyes: Negative for photophobia and visual disturbance.   Respiratory: Negative for cough, shortness of breath and wheezing.    Cardiovascular: Negative for chest pain and palpitations.   Gastrointestinal: Negative for abdominal pain, diarrhea, nausea and vomiting.   Endocrine: Negative for polyphagia and polyuria.   Genitourinary: Negative for dysuria and flank pain.   Musculoskeletal: Positive for arthralgias (left hip) and gait problem.   Skin: Negative for rash and wound.   Neurological: Negative for dizziness and weakness.   Psychiatric/Behavioral: Positive for confusion (mild). Negative for agitation.   All other systems reviewed and are negative.    Objective:     Vital Signs (Most Recent):  Temp: 98.3 °F (36.8 °C) (12/21/18 1940)  Pulse: 71 (12/21/18 1940)  Resp: 18 (12/21/18 1940)  BP: (!) 143/67 (12/21/18 1940)  SpO2: 96 % (12/21/18 2145) Vital Signs (24h Range):  Temp:  [98.3 °F (36.8 °C)-99 °F (37.2 °C)] 98.3 °F (36.8 °C)  Pulse:  [63-81] 71  Resp:  [16-18] 18  SpO2:  [92 %-99 %] 96 %  BP: (143-199)/(67-91) 143/67     Weight: 94.3 kg (208 lb)  Body mass index is 28.21 kg/m².    Physical Exam   Constitutional: He appears well-developed and well-nourished.   HENT:   Head: Normocephalic and atraumatic.   Eyes: Conjunctivae and EOM are normal. Pupils are equal, round, and reactive to light.   Neck: Normal range of motion. Neck supple. No JVD present.   Cardiovascular: Normal rate, regular rhythm, normal heart sounds and intact distal pulses.   Pulmonary/Chest: Effort normal and breath sounds normal. No stridor. No respiratory distress.   Abdominal: Soft. Bowel sounds are normal. He exhibits no distension. There is no tenderness.   Genitourinary:   Genitourinary Comments: deferred   Musculoskeletal: He exhibits tenderness (left hip).   Immobilizer in use to L hip   Neurological: He is alert.   Mild confusion   Skin: Skin is warm and dry. Capillary refill takes 2 to 3 seconds.   Psychiatric:  He has a normal mood and affect. His behavior is normal. Judgment normal.   Vitals reviewed.        CRANIAL NERVES     CN III, IV, VI   Pupils are equal, round, and reactive to light.  Extraocular motions are normal.        Significant Labs:   CBC:   Recent Labs   Lab 12/21/18  1608   WBC 6.10   HGB 9.8*   HCT 30.5*        CMP:   Recent Labs   Lab 12/21/18  1608      K 4.2      CO2 22*   GLU 77   BUN 13   CREATININE 0.8   CALCIUM 8.2*   ANIONGAP 7*   EGFRNONAA >60       Significant Imaging: Xray L hip: Left hip dislocation

## 2018-12-22 NOTE — HPI
Mr. 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 He presents to the ED with c/o left hip pain. Patient had a left hip arthroplasty secondary to a femoral neck fracture in October 2018. He was evaluated on 12/17/2018 and yesterday for left hip dislocation. The dislocation was reduced in the ED and he was placed in a abduction brace. The plan was to have a repair for instability done after Zeny.  He returns today with again L hip dislocation.  He is admitted to the service of hospital medicine and will be evaluated by the orthopedic surgeon.  Pt is a poor historian; information is obtained from ED and past medical records.

## 2018-12-22 NOTE — CONSULTS
Consult Note  Infectious Disease    Reason for Consult:  Elevated inflammatory markers    HPI: Adrian Osorio is a85 y.o. male who underwent left hip endoprosthesis placement in October but has suffered at least 3 dislocations of the hip in the interim. He was admitted and plans are in place for a total hip on 1224. He has had no fever, chills, sweats and is not having any active symptoms of infection. He did receive antibiotics associated with the treatment of cholecystitis requiring cholecystectomy in mid November at [facility]. He was also treated for the possibility of a lower respiratory tract infection postoperatively. He had urinary retention per their notes and care everywhere but this resolved as well.    Review of patient's allergies indicates:  No Known Allergies  Past Medical History:   Diagnosis Date    CHF (congestive heart failure)     Coronary artery disease     Hypertension      Past Surgical History:   Procedure Laterality Date    CARDIAC SURGERY      cabg    HEMIARTHROPLASTY, HIP Left 10/29/2018    Performed by Brando Neves MD at [facility] OR   Cholecystectomy November 2018  Social History     Socioeconomic History    Marital status:      Spouse name: None    Number of children: None    Years of education: None    Highest education level: None   Social Needs    Financial resource strain: None    Food insecurity - worry: None    Food insecurity - inability: None    Transportation needs - medical: None    Transportation needs - non-medical: None   Occupational History    None   Tobacco Use    Smoking status: Never Smoker    Smokeless tobacco: Never Used   Substance and Sexual Activity    Alcohol use: No    Drug use: No    Sexual activity: Not Currently   Other Topics Concern    None   Social History Narrative    None     Family History   Problem Relation Age of Onset    Heart disease Mother     Heart disease Father     Heart attack Sister     Hypertension  Brother     Stroke Brother        Pertinent medications noted:     Review of Systems: Extremely limited due to heart appearing  No chills, fever, sweats  No change in vision,   No sinus congestion, purulent nasal discharge, post nasal drip or facial pain  No pain in mouth or throat.  No chest pain,   No cough, sputum production, pleurisy, hemoptysis, shortness of breath,   No nausea, vomiting, diarrhea, constipation, or focal abd pain  No dysuria, hematuria, strangury, retention,      No unusual headaches, dizziness, vertigo,   No anxiety,   No diabetes,   No bleeding, lymphadenopathy, anemia, malignancy, unusual bruising    EXAM & DIAGNOSTICS REVIEWED:   Vitals:     Temp:  [97.9 °F (36.6 °C)-98.7 °F (37.1 °C)]   Temp: 98.4 °F (36.9 °C) (12/22/18 0858)  Pulse: 73 (12/22/18 0759)  Resp: 20 (12/22/18 0759)  BP: (!) 151/69 (12/22/18 0759)  SpO2: 96 % (12/22/18 1111)    Intake/Output Summary (Last 24 hours) at 12/22/2018 1751  Last data filed at 12/22/2018 1338  Gross per 24 hour   Intake 400 ml   Output 682 ml   Net -282 ml       General:  In NAD. Looks nontoxic. Very hard of hearing. Alert and attentive, cooperative  Eyes:  Anicteric, PERRL, EOMI  ENT:  Mouth w/ pink MMM, no lesions/exudate, poor dentition  Neck:  Trachea midline, supple, no adenopathy appreciated  Lungs: Clear  Heart:  RRR, no gallop/murmur noted  Abd:  soft, NT, ND, normal BS, no masses/organomegaly appreciated.  :  Voids but has difficulty because he is flat on his back in a hip brace  Musc:  Left hip soft tissues or edematous there is minimal erythema at the distal end of the hip incision. The edema is likely dependent as he does also have this in the left flank the hip is internally rotated and his leg is uncomfortable when moved.   Skin:  Generally warm, dry, normal for color. No rashes. No palmar or plantar    lesions. No subungual petechiae  Wound:   Neuro: AAOx3 when he can hear what you are saying, speech clear, moves all extrems  equally  Extrem: No peripheral edema, erythema, phlebitis, cellulitis,   VAD:    Lines/Tubes/Drains:    General Labs reviewed:    Recent Labs   Lab 12/21/18  1608   WBC 6.10   HGB 9.8*   HCT 30.5*        Micro:    Imaging Reviewed:   CXR and hip x-ray      IMPRESSION & PLAN     Imp:  No sign of infection           ESR of 40 is not unusual for someone his age and CRP is probably reasonable for someone whose hip is dislocating          S/p cholecystectomy one month ago after which he had some urinary retention.          ROS is negative    Rec: check procalcitonin, post void residual, cultures at time of surgery

## 2018-12-22 NOTE — ASSESSMENT & PLAN NOTE
Pt will be schedule for L hip endo conversion to KAYLEE with Dr. Neves on Monday 12/24/18. NPO after MN Sunday

## 2018-12-22 NOTE — PT/OT/SLP PROGRESS
Physical Therapy      Patient Name:  Adrian Osorio   MRN:  [id number]    Patient not seen today secondary to patient in too much pain  . Will follow-up 12/23/2018.    Dominik Pacheco, PT

## 2018-12-22 NOTE — PLAN OF CARE
Problem: Adult Inpatient Plan of Care  Goal: Plan of Care Review  Outcome: Ongoing (interventions implemented as appropriate)  Plan of care reviewed with pt/family at beginning of shift  Family verbalized understanding. Hourly/ Q2 hourly rounds completed on this pt throughout shift.  Pain monitored- pt has denied need for pain medication thus far throughout shift, voiding via  urinal, repositioned as tolerated with restrictions on L hip, safety maintained.  Patient has remained free from fall/injury, no new skin breakdown noted.  Side rails up x2, bed in locked and lowest position, call light kept within reach.  Needs attended to, will continue to monitor/ update as indicated

## 2018-12-22 NOTE — CONSULTS
[facility]  Orthopedics  Consult Note    Patient Name: Adrian Osorio  MRN: [id number]  Admission Date: 12/21/2018  Hospital Length of Stay: 1 days  Attending Provider: Pascale Nash MD  Primary Care Provider: Martín Perales MD    Patient information was obtained from ER records.     Consults  Subjective:     Principal Problem:Hip dislocation, left, initial encounter    Chief Complaint:   Chief Complaint   Patient presents with    Hip Injury     C/o dislocated left hip.         HPI: Admitted through ED yesterday due to recurrent L hip dislocation S/P endoprosthesis due to previous Fx    Past Medical History:   Diagnosis Date    CHF (congestive heart failure)     Coronary artery disease     Hypertension        Past Surgical History:   Procedure Laterality Date    CARDIAC SURGERY      cabg    HEMIARTHROPLASTY, HIP Left 10/29/2018    Performed by Brando [facility]es MD at [facility] OR       Review of patient's allergies indicates:  No Known Allergies    Current Facility-Administered Medications   Medication    acetaminophen tablet 650 mg    amLODIPine tablet 5 mg    aspirin tablet 325 mg    carvedilol tablet 6.25 mg    donepezil tablet 5 mg    enoxaparin injection 40 mg    ergocalciferol capsule 50,000 Units    memantine tablet 10 mg    morphine injection 4 mg    potassium chloride SA CR tablet 20 mEq    QUEtiapine tablet 25 mg    tamsulosin 24 hr capsule 0.4 mg    valsartan tablet 160 mg     Family History     Problem Relation (Age of Onset)    Heart attack Sister    Heart disease Mother, Father    Hypertension Brother    Stroke Brother        Tobacco Use    Smoking status: Never Smoker    Smokeless tobacco: Never Used   Substance and Sexual Activity    Alcohol use: No    Drug use: No    Sexual activity: Not Currently     Review of Systems   Constitution: Negative for chills and decreased appetite.   HENT: Positive for hearing loss.    Eyes: Negative for double vision.    Cardiovascular: Negative for chest pain.   Respiratory: Negative for cough and shortness of breath.    Gastrointestinal: Negative for bloating and abdominal pain.     Objective:     Vital Signs (Most Recent):  Temp: 98.4 °F (36.9 °C) (12/22/18 0759)  Pulse: 73 (12/22/18 0759)  Resp: 20 (12/22/18 0759)  BP: (!) 151/69 (12/22/18 0759)  SpO2: 96 % (12/22/18 0759) Vital Signs (24h Range):  Temp:  [97.9 °F (36.6 °C)-99 °F (37.2 °C)] 98.4 °F (36.9 °C)  Pulse:  [63-81] 73  Resp:  [16-20] 20  SpO2:  [92 %-99 %] 96 %  BP: (120-199)/(56-91) 151/69     Weight: 94.3 kg (208 lb)  Height: 6' (182.9 cm)  Body mass index is 28.21 kg/m².    No intake or output data in the 24 hours ending 12/22/18 0852    General    Nursing note and vitals reviewed.  Constitutional: He is oriented to person, place, and time. He appears well-developed and well-nourished.   Pulmonary/Chest: Effort normal.   Abdominal: Soft. Bowel sounds are normal.   Neurological: He is alert and oriented to person, place, and time.   Psychiatric: He has a normal mood and affect. His behavior is normal.         Left Hip Exam     Comments:  LLE DNVI. Brace in place. Resting supine with L hip IR            Significant Labs:   CBC:   Recent Labs   Lab 12/21/18  1608   WBC 6.10   HGB 9.8*   HCT 30.5*        CMP:   Recent Labs   Lab 12/21/18  1608      K 4.2      CO2 22*   GLU 77   BUN 13   CREATININE 0.8   CALCIUM 8.2*   ANIONGAP 7*   EGFRNONAA >60     All pertinent labs within the past 24 hours have been reviewed.    Significant Imaging: X-Ray: I have reviewed all pertinent results/findings and my personal findings are:  Post-reduction L hip X-rays: Interval reduction of the superolateral dislocation.  Prosthetic femoral head normally positioned within the native acetabulum.  No linear lucency to suggest an acute fracture.    Assessment/Plan:     * Hip dislocation, left, initial encounter    Pt will be schedule for L hip endo conversion to KAYLEE with  Dr. Neves on Monday 12/24/18. NPO after MN Sunday         Thank you for your consult. I will follow-up with patient. Please contact us if you have any additional questions.    MERCEDES GUEVARA  Orthopedics  [facility]

## 2018-12-23 PROCEDURE — 94761 N-INVAS EAR/PLS OXIMETRY MLT: CPT

## 2018-12-23 PROCEDURE — 25000003 PHARM REV CODE 250: Performed by: INTERNAL MEDICINE

## 2018-12-23 PROCEDURE — 12000002 HC ACUTE/MED SURGE SEMI-PRIVATE ROOM

## 2018-12-23 PROCEDURE — 25000003 PHARM REV CODE 250: Performed by: EMERGENCY MEDICINE

## 2018-12-23 PROCEDURE — 63600175 PHARM REV CODE 636 W HCPCS: Performed by: EMERGENCY MEDICINE

## 2018-12-23 PROCEDURE — 27000221 HC OXYGEN, UP TO 24 HOURS

## 2018-12-23 RX ADMIN — MORPHINE SULFATE 4 MG: 2 INJECTION, SOLUTION INTRAMUSCULAR; INTRAVENOUS at 12:12

## 2018-12-23 RX ADMIN — OXYCODONE HYDROCHLORIDE 5 MG: 5 TABLET ORAL at 10:12

## 2018-12-23 RX ADMIN — QUETIAPINE FUMARATE 25 MG: 25 TABLET ORAL at 08:12

## 2018-12-23 RX ADMIN — QUETIAPINE FUMARATE 25 MG: 25 TABLET ORAL at 09:12

## 2018-12-23 RX ADMIN — DONEPEZIL HYDROCHLORIDE 5 MG: 5 TABLET, FILM COATED ORAL at 08:12

## 2018-12-23 RX ADMIN — CARVEDILOL 6.25 MG: 6.25 TABLET, FILM COATED ORAL at 04:12

## 2018-12-23 RX ADMIN — MEMANTINE HYDROCHLORIDE 10 MG: 5 TABLET ORAL at 08:12

## 2018-12-23 RX ADMIN — VALSARTAN 160 MG: 80 TABLET ORAL at 09:12

## 2018-12-23 RX ADMIN — ASPIRIN 325 MG ORAL TABLET 325 MG: 325 PILL ORAL at 08:12

## 2018-12-23 RX ADMIN — CARVEDILOL 6.25 MG: 6.25 TABLET, FILM COATED ORAL at 09:12

## 2018-12-23 RX ADMIN — AMLODIPINE BESYLATE 5 MG: 5 TABLET ORAL at 08:12

## 2018-12-23 RX ADMIN — MORPHINE SULFATE 4 MG: 2 INJECTION, SOLUTION INTRAMUSCULAR; INTRAVENOUS at 01:12

## 2018-12-23 RX ADMIN — POTASSIUM CHLORIDE 20 MEQ: 20 TABLET, EXTENDED RELEASE ORAL at 09:12

## 2018-12-23 RX ADMIN — ASPIRIN 325 MG ORAL TABLET 325 MG: 325 PILL ORAL at 09:12

## 2018-12-23 RX ADMIN — TAMSULOSIN HYDROCHLORIDE 0.4 MG: 0.4 CAPSULE ORAL at 09:12

## 2018-12-23 RX ADMIN — MEMANTINE HYDROCHLORIDE 10 MG: 5 TABLET ORAL at 09:12

## 2018-12-23 RX ADMIN — AMLODIPINE BESYLATE 5 MG: 5 TABLET ORAL at 09:12

## 2018-12-23 NOTE — PROGRESS NOTES
Afebrile  Left hip and thigh remain edematous and mildly erythematous which I believe is from stasis edema.  Unsure if he is getting turned frequently as the brace is very cumbersome and he is uncomfortable.    procalcitonin normal    No evidence of infection  If introperative findings are of concern for infection, please call   Will sign off

## 2018-12-23 NOTE — PROGRESS NOTES
[facility]  Orthopedics  Progress Note    Patient Name: Adrian Osorio  MRN: [id number]  Admission Date: 12/21/2018  Hospital Length of Stay: 2 days  Attending Provider: Pascale Nash MD  Primary Care Provider: Martín Perales MD    Subjective:     Principal Problem:Hip dislocation, left, initial encounter    Principal Orthopedic Problem: Recurrent L hip dislocation    Interval History: eval by Dr. Raza. I spoke with Dr. Raza yesterday. Dr. Neves will take intra-op cultures. We do NOT think that the hip is septic.    Review of patient's allergies indicates:  No Known Allergies    Current Facility-Administered Medications   Medication    acetaminophen tablet 650 mg    amLODIPine tablet 5 mg    aspirin tablet 325 mg    carvedilol tablet 6.25 mg    donepezil tablet 5 mg    enoxaparin injection 40 mg    ergocalciferol capsule 50,000 Units    memantine tablet 10 mg    morphine injection 4 mg    oxyCODONE immediate release tablet 5 mg    polyethylene glycol packet 17 g    potassium chloride SA CR tablet 20 mEq    QUEtiapine tablet 25 mg    tamsulosin 24 hr capsule 0.4 mg    valsartan tablet 160 mg     Objective:     Vital Signs (Most Recent):  Temp: 98 °F (36.7 °C) (12/23/18 0853)  Pulse: 74 (12/23/18 0853)  Resp: 18 (12/23/18 0853)  BP: (!) 159/72 (12/23/18 0853)  SpO2: (!) 92 % (12/23/18 0853) Vital Signs (24h Range):  Temp:  [98 °F (36.7 °C)-98.7 °F (37.1 °C)] 98 °F (36.7 °C)  Pulse:  [72-74] 74  Resp:  [16-18] 18  SpO2:  [91 %-98 %] 92 %  BP: (135-159)/(61-72) 159/72     Weight: 94.3 kg (208 lb)  Height: 6' (182.9 cm)  Body mass index is 28.21 kg/m².      Intake/Output Summary (Last 24 hours) at 12/23/2018 1009  Last data filed at 12/22/2018 1800  Gross per 24 hour   Intake 320 ml   Output 632 ml   Net -312 ml       General    Nursing note and vitals reviewed.  Constitutional: He is oriented to person, place, and time. He appears well-developed and well-nourished.    Pulmonary/Chest: Effort normal.   Neurological: He is alert and oriented to person, place, and time.   Psychiatric: He has a normal mood and affect.         Left Hip Exam     Comments:  L lateral hip mild erythema and increased warmth. Brace in place. Patient guards LLE. I am able to bring his L hip through some PROM without any significant pain. However, the patient limits ROM because he is guarding and anxious of dislocation/ pain. However, his exam does not exhibit findings of a septic hip.            Significant Labs:   CBC:   Recent Labs   Lab 12/21/18  1608   WBC 6.10   HGB 9.8*   HCT 30.5*        CMP:   Recent Labs   Lab 12/21/18  1608      K 4.2      CO2 22*   GLU 77   BUN 13   CREATININE 0.8   CALCIUM 8.2*   ANIONGAP 7*   EGFRNONAA >60     CRP:   Recent Labs   Lab 12/21/18  1608   .0*     All pertinent labs within the past 24 hours have been reviewed.    Significant Imaging: None    Assessment/Plan:     * Hip dislocation, left, initial encounter    NPO after MN  Is going to the OR tomorrow to convert L hip endo to KAYLEE with constrained liner.  Intra-op cultures to be taken           MERCEDES GUEVARA  Orthopedics  [facility]

## 2018-12-23 NOTE — SUBJECTIVE & OBJECTIVE
Interval History: The pt's only complaint is hip pain    Review of Systems   Unable to perform ROS: Dementia     Objective:     Vital Signs (Most Recent):  Temp: 97.5 °F (36.4 °C) (12/23/18 1139)  Pulse: 73 (12/23/18 1139)  Resp: 20 (12/23/18 1139)  BP: 137/65 (12/23/18 1139)  SpO2: (!) 94 % (12/23/18 1139) Vital Signs (24h Range):  Temp:  [97.5 °F (36.4 °C)-98.7 °F (37.1 °C)] 97.5 °F (36.4 °C)  Pulse:  [72-74] 73  Resp:  [16-20] 20  SpO2:  [91 %-98 %] 94 %  BP: (135-159)/(61-72) 137/65     Weight: 94.3 kg (208 lb)  Body mass index is 28.21 kg/m².    Intake/Output Summary (Last 24 hours) at 12/23/2018 1252  Last data filed at 12/22/2018 1800  Gross per 24 hour   Intake 320 ml   Output 632 ml   Net -312 ml      Physical Exam   HENT:   Head: Normocephalic and atraumatic.   Several bruises on his face   Eyes: Conjunctivae and EOM are normal. Pupils are equal, round, and reactive to light.   Neck: Normal range of motion. Neck supple. No JVD present.   Cardiovascular: Normal rate, regular rhythm, normal heart sounds and intact distal pulses.   Pulmonary/Chest: Effort normal and breath sounds normal. No stridor. No respiratory distress.   Abdominal: Soft. Bowel sounds are normal. He exhibits no distension. There is no tenderness.   Genitourinary:   Genitourinary Comments: deferred   Musculoskeletal: He exhibits tenderness (left hip). He exhibits no edema.   Immobilizer in use to L hip   Neurological: He is alert. No cranial nerve deficit or sensory deficit.   Mild confusion   Skin: Skin is warm and dry. Capillary refill takes 2 to 3 seconds.   Psychiatric: He has a normal mood and affect. His behavior is normal. Judgment normal.   Vitals reviewed.      Significant Labs:   BMP:   Recent Labs   Lab 12/21/18  1608   GLU 77      K 4.2      CO2 22*   BUN 13   CREATININE 0.8   CALCIUM 8.2*     CBC:   Recent Labs   Lab 12/21/18  1608   WBC 6.10   HGB 9.8*   HCT 30.5*          Significant Imaging: I have  reviewed all pertinent imaging results/findings within the past 24 hours.

## 2018-12-23 NOTE — PLAN OF CARE
12/22/18 2038   Patient Assessment/Suction   Level of Consciousness (AVPU) alert   PRE-TX-O2-ETCO2   O2 Device (Oxygen Therapy) nasal cannula   Flow (L/min) 2   Oxygen Concentration (%) 28   SpO2 97 %   Pulse Oximetry Type Intermittent   Ready to Wean/Extubation Screen   FIO2<=50 (chart decimal) 0.28

## 2018-12-23 NOTE — PLAN OF CARE
SW met w/ pt's son, Bryan Osorio (pt's POA) [phone] to complete assessment. Pt lives w/ dtr Madeleine that assists in his care.  Pt was d/c from [facility] approx 6 weeks ago (approx 20 day admit), d/c home w/ Home Health.  Pt still active w/ [facility].  Pt's son concerned that pt may need care from SNF but that Madeleine will want pt to d/c home.  Pt is scheduled for hip surgery Monday.  Pt's son Bryan is the point of contact for the pt but also wants to ensure his sister Madeleine is included in the d/c planning. Bryan signed the Disclosure form for HH and in event SNF is the d/c plan.         12/23/18 1750   Discharge Assessment   Assessment Type Discharge Planning Reassessment   Confirmed/corrected address and phone number on facesheet? Yes   Assessment information obtained from? Caregiver   Prior to hospitilization cognitive status: Alert/Oriented   Prior to hospitalization functional status: Needs Assistance   Current cognitive status: Unable to Assess   Current Functional Status: Needs Assistance   Facility Arrived From: home w/ Home Health   Lives With child(pk), adult   Able to Return to Prior Arrangements yes   Is patient able to care for self after discharge? Unable to determine at this time (comments)   Who are your caregiver(s) and their phone number(s)? daughter:  Madeleine Boudreaux [phone]      son/POA Bryan Osorio  [phone]   Patient's perception of discharge disposition skilled nursing facility   Readmission Within the Last 30 Days no previous admission in last 30 days   Patient currently being followed by outpatient case management? No   Patient currently receives any other outside agency services? Yes   Name and contact number of agency or person providing outside services [facility]   Is it the patient/care giver preference to resume care with the current outside agency? Yes   Equipment Currently Used at Home bedside commode;walker, rolling   Do you  have any problems affording any of your prescribed medications? No   Is the patient taking medications as prescribed? yes   Does the patient have transportation home? Yes   Transportation Anticipated family or friend will provide   Does the patient receive services at the [facility]? No   Discharge Plan A Skilled Nursing Facility   Discharge Plan B Home with family;Home Health   Patient/Family in Agreement with Plan yes

## 2018-12-23 NOTE — PROGRESS NOTES
[facility] Hospital Medicine  Progress Note    Patient Name: Adrian Osorio  MRN: [id number]  Patient Class: IP- Inpatient   Admission Date: 12/21/2018  Length of Stay: 2 days  Attending Physician: Pascale Nash MD  Primary Care Provider: Martín Perales MD        Subjective:     Principal Problem:Hip dislocation, left, initial encounter    HPI:  Mr. Osorio is an 84yo M with a PMH of Dementia, HTN, CAD/CABG 2008, CHF, ICMO with EF 35%. He presents to the ED with c/o left hip pain. Patient had a left hip arthroplasty secondary to a femoral neck fracture in October 2018. He was evaluated on 12/17/2018 and yesterday for left hip dislocation. The dislocation was reduced in the ED and he was placed in a abduction brace. The plan was to have a repair for instability done after Zeny.  He returns today with again L hip dislocation.  He is admitted to the service of hospital medicine and will be evaluated by the orthopedic surgeon.  Pt is a poor historian; information is obtained from ED and past medical records.            Hospital Course:  No notes on file    Interval History: The pt's only complaint is hip pain    Review of Systems   Unable to perform ROS: Dementia     Objective:     Vital Signs (Most Recent):  Temp: 97.5 °F (36.4 °C) (12/23/18 1139)  Pulse: 73 (12/23/18 1139)  Resp: 20 (12/23/18 1139)  BP: 137/65 (12/23/18 1139)  SpO2: (!) 94 % (12/23/18 1139) Vital Signs (24h Range):  Temp:  [97.5 °F (36.4 °C)-98.7 °F (37.1 °C)] 97.5 °F (36.4 °C)  Pulse:  [72-74] 73  Resp:  [16-20] 20  SpO2:  [91 %-98 %] 94 %  BP: (135-159)/(61-72) 137/65     Weight: 94.3 kg (208 lb)  Body mass index is 28.21 kg/m².    Intake/Output Summary (Last 24 hours) at 12/23/2018 1252  Last data filed at 12/22/2018 1800  Gross per 24 hour   Intake 320 ml   Output 632 ml   Net -312 ml      Physical Exam   HENT:   Head: Normocephalic and atraumatic.   Several bruises on his face   Eyes: Conjunctivae and EOM  are normal. Pupils are equal, round, and reactive to light.   Neck: Normal range of motion. Neck supple. No JVD present.   Cardiovascular: Normal rate, regular rhythm, normal heart sounds and intact distal pulses.   Pulmonary/Chest: Effort normal and breath sounds normal. No stridor. No respiratory distress.   Abdominal: Soft. Bowel sounds are normal. He exhibits no distension. There is no tenderness.   Genitourinary:   Genitourinary Comments: deferred   Musculoskeletal: He exhibits tenderness (left hip). He exhibits no edema.   Immobilizer in use to L hip   Neurological: He is alert. No cranial nerve deficit or sensory deficit.   Mild confusion   Skin: Skin is warm and dry. Capillary refill takes 2 to 3 seconds.   Psychiatric: He has a normal mood and affect. His behavior is normal. Judgment normal.   Vitals reviewed.      Significant Labs:   BMP:   Recent Labs   Lab 12/21/18  1608   GLU 77      K 4.2      CO2 22*   BUN 13   CREATININE 0.8   CALCIUM 8.2*     CBC:   Recent Labs   Lab 12/21/18  1608   WBC 6.10   HGB 9.8*   HCT 30.5*          Significant Imaging: I have reviewed all pertinent imaging results/findings within the past 24 hours.    Assessment/Plan:      * Hip dislocation, left, initial encounter    Sx tomorrow       Essential hypertension    Chronic/Controlled. Continue chronic medications, adjusting as needed.       Chronic systolic heart failure/ ICMO EF 40%    Stable.  Continue BB, diuretic, and ARB  Last Echo 10/24/18: The estimated LV ejection fraction is 40 %. LV systolic function is mildly to moderately reduced. Diastolic function is indeterminate.       Alzheimer's dementia without behavioral disturbance    Stable. Continue Namenda, Aricept, and Seroquel.  Delirium precautions.  Fall precautions         VTE Risk Mitigation (From admission, onward)        Ordered     IP VTE HIGH RISK PATIENT  Once      12/21/18 1938     enoxaparin injection 40 mg  Every 24 hours (non-standard  times)      12/21/18 2043              Petra Mccollum MD  Department of Hospital Medicine   [facility]

## 2018-12-23 NOTE — SUBJECTIVE & OBJECTIVE
Principal Problem:Hip dislocation, left, initial encounter    Principal Orthopedic Problem: Recurrent L hip dislocation    Interval History: eval by Dr. Raza. I spoke with Dr. Raza yesterday. Dr. Neves will take intra-op cultures. We do NOT think that the hip is septic.    Review of patient's allergies indicates:  No Known Allergies    Current Facility-Administered Medications   Medication    acetaminophen tablet 650 mg    amLODIPine tablet 5 mg    aspirin tablet 325 mg    carvedilol tablet 6.25 mg    donepezil tablet 5 mg    enoxaparin injection 40 mg    ergocalciferol capsule 50,000 Units    memantine tablet 10 mg    morphine injection 4 mg    oxyCODONE immediate release tablet 5 mg    polyethylene glycol packet 17 g    potassium chloride SA CR tablet 20 mEq    QUEtiapine tablet 25 mg    tamsulosin 24 hr capsule 0.4 mg    valsartan tablet 160 mg     Objective:     Vital Signs (Most Recent):  Temp: 98 °F (36.7 °C) (12/23/18 0853)  Pulse: 74 (12/23/18 0853)  Resp: 18 (12/23/18 0853)  BP: (!) 159/72 (12/23/18 0853)  SpO2: (!) 92 % (12/23/18 0853) Vital Signs (24h Range):  Temp:  [98 °F (36.7 °C)-98.7 °F (37.1 °C)] 98 °F (36.7 °C)  Pulse:  [72-74] 74  Resp:  [16-18] 18  SpO2:  [91 %-98 %] 92 %  BP: (135-159)/(61-72) 159/72     Weight: 94.3 kg (208 lb)  Height: 6' (182.9 cm)  Body mass index is 28.21 kg/m².      Intake/Output Summary (Last 24 hours) at 12/23/2018 1009  Last data filed at 12/22/2018 1800  Gross per 24 hour   Intake 320 ml   Output 632 ml   Net -312 ml       General    Nursing note and vitals reviewed.  Constitutional: He is oriented to person, place, and time. He appears well-developed and well-nourished.   Pulmonary/Chest: Effort normal.   Neurological: He is alert and oriented to person, place, and time.   Psychiatric: He has a normal mood and affect.         Left Hip Exam     Comments:  L lateral hip mild erythema and increased warmth. Brace in place. Patient guards LLE. I am  able to bring his L hip through some PROM without any significant pain. However, the patient limits ROM because he is guarding and anxious of dislocation/ pain. However, his exam does not exhibit findings of a septic hip.            Significant Labs:   CBC:   Recent Labs   Lab 12/21/18  1608   WBC 6.10   HGB 9.8*   HCT 30.5*        CMP:   Recent Labs   Lab 12/21/18  1608      K 4.2      CO2 22*   GLU 77   BUN 13   CREATININE 0.8   CALCIUM 8.2*   ANIONGAP 7*   EGFRNONAA >60     CRP:   Recent Labs   Lab 12/21/18  1608   .0*     All pertinent labs within the past 24 hours have been reviewed.    Significant Imaging: None

## 2018-12-23 NOTE — PLAN OF CARE
12/23/18 1111   PRE-TX-O2-ETCO2   O2 Device (Oxygen Therapy) nasal cannula   $ Is the patient on Low Flow Oxygen? Yes   Flow (L/min) 2   Oxygen Concentration (%) 28   SpO2 96 %   Pulse Oximetry Type Intermittent   $ Pulse Oximetry - Multiple Charge Pulse Oximetry - Multiple   Ready to Wean/Extubation Screen   FIO2<=50 (chart decimal) 0.28

## 2018-12-23 NOTE — PLAN OF CARE
Problem: Adult Inpatient Plan of Care  Goal: Plan of Care Review  Outcome: Ongoing (interventions implemented as appropriate)  Patient alert to self only, VSS. Pt awake throughout night.  Sitter at bedside. Avasys in use.  Left hip brace on. Pt verbalized understanding of POC. Q2hr rounding done during shift to promote patient safety. Patient free from falls and injury during shift.  Bed in lowest position, brakes locked, and call light within reach.  Will continue to monitor.

## 2018-12-23 NOTE — PLAN OF CARE
Sw met w/ pt for assessment.  Pt having difficulty hearing to complete assessment.  Will follow up w/ family.           12/23/18 2238   Discharge Assessment   Assessment Type Discharge Planning Assessment   Assessment information obtained from? Patient

## 2018-12-23 NOTE — ASSESSMENT & PLAN NOTE
NPO after MN  Is going to the OR tomorrow to convert L hip endo to KAYLEE with constrained liner.  Intra-op cultures to be taken

## 2018-12-24 ENCOUNTER — ANESTHESIA EVENT (OUTPATIENT)
Dept: SURGERY | Facility: HOSPITAL | Age: 83
DRG: 466 | End: 2018-12-24
Payer: MEDICARE

## 2018-12-24 ENCOUNTER — ANESTHESIA (OUTPATIENT)
Dept: SURGERY | Facility: HOSPITAL | Age: 83
DRG: 466 | End: 2018-12-24
Payer: MEDICARE

## 2018-12-24 LAB
ANION GAP SERPL CALC-SCNC: 8 MMOL/L
BASOPHILS # BLD AUTO: 0 K/UL
BASOPHILS NFR BLD: 0 %
BUN SERPL-MCNC: 10 MG/DL
CALCIUM SERPL-MCNC: 8.5 MG/DL
CHLORIDE SERPL-SCNC: 105 MMOL/L
CO2 SERPL-SCNC: 25 MMOL/L
CREAT SERPL-MCNC: 0.8 MG/DL
DIFFERENTIAL METHOD: ABNORMAL
EOSINOPHIL # BLD AUTO: 0 K/UL
EOSINOPHIL NFR BLD: 0.2 %
ERYTHROCYTE [DISTWIDTH] IN BLOOD BY AUTOMATED COUNT: 14.9 %
ERYTHROCYTE [DISTWIDTH] IN BLOOD BY AUTOMATED COUNT: 15.1 %
EST. GFR  (AFRICAN AMERICAN): >60 ML/MIN/1.73 M^2
EST. GFR  (NON AFRICAN AMERICAN): >60 ML/MIN/1.73 M^2
GLUCOSE SERPL-MCNC: 83 MG/DL
HCT VFR BLD AUTO: 30.6 %
HCT VFR BLD AUTO: 31.8 %
HGB BLD-MCNC: 10 G/DL
HGB BLD-MCNC: 10.6 G/DL
LYMPHOCYTES # BLD AUTO: 0.6 K/UL
LYMPHOCYTES NFR BLD: 6.5 %
MCH RBC QN AUTO: 29.2 PG
MCH RBC QN AUTO: 29.7 PG
MCHC RBC AUTO-ENTMCNC: 32.7 G/DL
MCHC RBC AUTO-ENTMCNC: 33.2 G/DL
MCV RBC AUTO: 89 FL
MCV RBC AUTO: 89 FL
MONOCYTES # BLD AUTO: 0.2 K/UL
MONOCYTES NFR BLD: 1.7 %
NEUTROPHILS # BLD AUTO: 8.5 K/UL
NEUTROPHILS NFR BLD: 91.6 %
PLATELET # BLD AUTO: 312 K/UL
PLATELET # BLD AUTO: 319 K/UL
PMV BLD AUTO: 6.7 FL
PMV BLD AUTO: 7 FL
POTASSIUM SERPL-SCNC: 4 MMOL/L
RBC # BLD AUTO: 3.43 M/UL
RBC # BLD AUTO: 3.56 M/UL
SODIUM SERPL-SCNC: 138 MMOL/L
WBC # BLD AUTO: 5.8 K/UL
WBC # BLD AUTO: 9.3 K/UL

## 2018-12-24 PROCEDURE — 63600175 PHARM REV CODE 636 W HCPCS: Performed by: NURSE ANESTHETIST, CERTIFIED REGISTERED

## 2018-12-24 PROCEDURE — C1776 JOINT DEVICE (IMPLANTABLE): HCPCS | Performed by: ORTHOPAEDIC SURGERY

## 2018-12-24 PROCEDURE — 85027 COMPLETE CBC AUTOMATED: CPT

## 2018-12-24 PROCEDURE — C1729 CATH, DRAINAGE: HCPCS | Performed by: ORTHOPAEDIC SURGERY

## 2018-12-24 PROCEDURE — 25000003 PHARM REV CODE 250: Performed by: ANESTHESIOLOGY

## 2018-12-24 PROCEDURE — S5010 5% DEXTROSE AND 0.45% SALINE: HCPCS | Performed by: ORTHOPAEDIC SURGERY

## 2018-12-24 PROCEDURE — 63600175 PHARM REV CODE 636 W HCPCS: Performed by: ORTHOPAEDIC SURGERY

## 2018-12-24 PROCEDURE — 36415 COLL VENOUS BLD VENIPUNCTURE: CPT

## 2018-12-24 PROCEDURE — 71000033 HC RECOVERY, INTIAL HOUR: Performed by: ORTHOPAEDIC SURGERY

## 2018-12-24 PROCEDURE — 87075 CULTR BACTERIA EXCEPT BLOOD: CPT

## 2018-12-24 PROCEDURE — 25000003 PHARM REV CODE 250: Performed by: ORTHOPAEDIC SURGERY

## 2018-12-24 PROCEDURE — 85025 COMPLETE CBC W/AUTO DIFF WBC: CPT

## 2018-12-24 PROCEDURE — 87070 CULTURE OTHR SPECIMN AEROBIC: CPT

## 2018-12-24 PROCEDURE — 80048 BASIC METABOLIC PNL TOTAL CA: CPT

## 2018-12-24 PROCEDURE — 99232 SBSQ HOSP IP/OBS MODERATE 35: CPT | Mod: ,,, | Performed by: INTERNAL MEDICINE

## 2018-12-24 PROCEDURE — D9220A PRA ANESTHESIA: Mod: CRNA,,, | Performed by: NURSE ANESTHETIST, CERTIFIED REGISTERED

## 2018-12-24 PROCEDURE — 37000009 HC ANESTHESIA EA ADD 15 MINS: Performed by: ORTHOPAEDIC SURGERY

## 2018-12-24 PROCEDURE — 12000002 HC ACUTE/MED SURGE SEMI-PRIVATE ROOM

## 2018-12-24 PROCEDURE — 94761 N-INVAS EAR/PLS OXIMETRY MLT: CPT

## 2018-12-24 PROCEDURE — 25000003 PHARM REV CODE 250: Performed by: NURSE ANESTHETIST, CERTIFIED REGISTERED

## 2018-12-24 PROCEDURE — D9220A PRA ANESTHESIA: Mod: ANES,,, | Performed by: ANESTHESIOLOGY

## 2018-12-24 PROCEDURE — 99900104 DSU ONLY-NO CHARGE-EA ADD'L HR (STAT): Performed by: ORTHOPAEDIC SURGERY

## 2018-12-24 PROCEDURE — 27201423 OPTIME MED/SURG SUP & DEVICES STERILE SUPPLY: Performed by: ORTHOPAEDIC SURGERY

## 2018-12-24 PROCEDURE — 99232 PR SUBSEQUENT HOSPITAL CARE,LEVL II: ICD-10-PCS | Mod: ,,, | Performed by: INTERNAL MEDICINE

## 2018-12-24 PROCEDURE — 99900103 DSU ONLY-NO CHARGE-INITIAL HR (STAT): Performed by: ORTHOPAEDIC SURGERY

## 2018-12-24 PROCEDURE — 36000711: Performed by: ORTHOPAEDIC SURGERY

## 2018-12-24 PROCEDURE — C1713 ANCHOR/SCREW BN/BN,TIS/BN: HCPCS | Performed by: ORTHOPAEDIC SURGERY

## 2018-12-24 PROCEDURE — 37000008 HC ANESTHESIA 1ST 15 MINUTES: Performed by: ORTHOPAEDIC SURGERY

## 2018-12-24 PROCEDURE — 36000710: Performed by: ORTHOPAEDIC SURGERY

## 2018-12-24 DEVICE — IMPLANTABLE DEVICE: Type: IMPLANTABLE DEVICE | Site: HIP | Status: FUNCTIONAL

## 2018-12-24 DEVICE — SCREW CANC ACT 6.5X35MM: Type: IMPLANTABLE DEVICE | Site: HIP | Status: FUNCTIONAL

## 2018-12-24 DEVICE — SCREW PINNACLE 6.5 X 20: Type: IMPLANTABLE DEVICE | Site: HIP | Status: FUNCTIONAL

## 2018-12-24 RX ORDER — FENTANYL CITRATE 50 UG/ML
25 INJECTION, SOLUTION INTRAMUSCULAR; INTRAVENOUS EVERY 5 MIN PRN
Status: DISCONTINUED | OUTPATIENT
Start: 2018-12-24 | End: 2018-12-24

## 2018-12-24 RX ORDER — ONDANSETRON 2 MG/ML
INJECTION INTRAMUSCULAR; INTRAVENOUS
Status: DISCONTINUED | OUTPATIENT
Start: 2018-12-24 | End: 2018-12-24

## 2018-12-24 RX ORDER — SODIUM CHLORIDE, SODIUM LACTATE, POTASSIUM CHLORIDE, CALCIUM CHLORIDE 600; 310; 30; 20 MG/100ML; MG/100ML; MG/100ML; MG/100ML
INJECTION, SOLUTION INTRAVENOUS CONTINUOUS
Status: DISCONTINUED | OUTPATIENT
Start: 2018-12-24 | End: 2018-12-24

## 2018-12-24 RX ORDER — ROCURONIUM BROMIDE 10 MG/ML
INJECTION, SOLUTION INTRAVENOUS
Status: DISCONTINUED | OUTPATIENT
Start: 2018-12-24 | End: 2018-12-24

## 2018-12-24 RX ORDER — CEFAZOLIN SODIUM 1 G/50ML
1 SOLUTION INTRAVENOUS
Status: DISCONTINUED | OUTPATIENT
Start: 2018-12-24 | End: 2018-12-25

## 2018-12-24 RX ORDER — SODIUM CHLORIDE 0.9 % (FLUSH) 0.9 %
5 SYRINGE (ML) INJECTION
Status: DISCONTINUED | OUTPATIENT
Start: 2018-12-24 | End: 2019-01-02 | Stop reason: HOSPADM

## 2018-12-24 RX ORDER — PROPOFOL 10 MG/ML
VIAL (ML) INTRAVENOUS
Status: DISCONTINUED | OUTPATIENT
Start: 2018-12-24 | End: 2018-12-24

## 2018-12-24 RX ORDER — DEXTROSE MONOHYDRATE AND SODIUM CHLORIDE 5; .45 G/100ML; G/100ML
INJECTION, SOLUTION INTRAVENOUS CONTINUOUS
Status: DISCONTINUED | OUTPATIENT
Start: 2018-12-24 | End: 2018-12-25

## 2018-12-24 RX ORDER — FAMOTIDINE 20 MG/1
20 TABLET, FILM COATED ORAL 2 TIMES DAILY
Status: DISCONTINUED | OUTPATIENT
Start: 2018-12-24 | End: 2019-01-02 | Stop reason: HOSPADM

## 2018-12-24 RX ORDER — NEOSTIGMINE METHYLSULFATE 1 MG/ML
INJECTION, SOLUTION INTRAVENOUS
Status: DISCONTINUED | OUTPATIENT
Start: 2018-12-24 | End: 2018-12-24

## 2018-12-24 RX ORDER — DEXAMETHASONE SODIUM PHOSPHATE 4 MG/ML
INJECTION, SOLUTION INTRA-ARTICULAR; INTRALESIONAL; INTRAMUSCULAR; INTRAVENOUS; SOFT TISSUE
Status: DISCONTINUED | OUTPATIENT
Start: 2018-12-24 | End: 2018-12-24

## 2018-12-24 RX ORDER — FENTANYL CITRATE 50 UG/ML
INJECTION, SOLUTION INTRAMUSCULAR; INTRAVENOUS
Status: DISCONTINUED | OUTPATIENT
Start: 2018-12-24 | End: 2018-12-24

## 2018-12-24 RX ORDER — GLYCOPYRROLATE 0.2 MG/ML
INJECTION INTRAMUSCULAR; INTRAVENOUS
Status: DISCONTINUED | OUTPATIENT
Start: 2018-12-24 | End: 2018-12-24

## 2018-12-24 RX ORDER — ONDANSETRON 2 MG/ML
4 INJECTION INTRAMUSCULAR; INTRAVENOUS ONCE AS NEEDED
Status: DISCONTINUED | OUTPATIENT
Start: 2018-12-24 | End: 2018-12-24

## 2018-12-24 RX ORDER — LIDOCAINE HCL/PF 100 MG/5ML
SYRINGE (ML) INTRAVENOUS
Status: DISCONTINUED | OUTPATIENT
Start: 2018-12-24 | End: 2018-12-24

## 2018-12-24 RX ORDER — PHENYLEPHRINE HYDROCHLORIDE 10 MG/ML
INJECTION INTRAVENOUS
Status: DISCONTINUED | OUTPATIENT
Start: 2018-12-24 | End: 2018-12-24

## 2018-12-24 RX ORDER — LIDOCAINE HYDROCHLORIDE 10 MG/ML
1 INJECTION, SOLUTION EPIDURAL; INFILTRATION; INTRACAUDAL; PERINEURAL ONCE
Status: DISCONTINUED | OUTPATIENT
Start: 2018-12-24 | End: 2018-12-24

## 2018-12-24 RX ORDER — CEFAZOLIN SODIUM 1 G/3ML
INJECTION, POWDER, FOR SOLUTION INTRAMUSCULAR; INTRAVENOUS
Status: DISCONTINUED | OUTPATIENT
Start: 2018-12-24 | End: 2018-12-24

## 2018-12-24 RX ORDER — MEGESTROL ACETATE 40 MG/ML
200 SUSPENSION ORAL DAILY
Status: DISCONTINUED | OUTPATIENT
Start: 2018-12-25 | End: 2019-01-02 | Stop reason: HOSPADM

## 2018-12-24 RX ORDER — ZOLPIDEM TARTRATE 5 MG/1
5 TABLET ORAL NIGHTLY PRN
Status: DISCONTINUED | OUTPATIENT
Start: 2018-12-24 | End: 2018-12-27

## 2018-12-24 RX ORDER — TRANEXAMIC ACID 100 MG/ML
INJECTION, SOLUTION INTRAVENOUS
Status: DISCONTINUED | OUTPATIENT
Start: 2018-12-24 | End: 2018-12-24

## 2018-12-24 RX ORDER — SUCCINYLCHOLINE CHLORIDE 20 MG/ML
INJECTION INTRAMUSCULAR; INTRAVENOUS
Status: DISCONTINUED | OUTPATIENT
Start: 2018-12-24 | End: 2018-12-24

## 2018-12-24 RX ORDER — EPHEDRINE SULFATE 50 MG/ML
INJECTION, SOLUTION INTRAVENOUS
Status: DISCONTINUED | OUTPATIENT
Start: 2018-12-24 | End: 2018-12-24

## 2018-12-24 RX ORDER — SODIUM CHLORIDE, SODIUM LACTATE, POTASSIUM CHLORIDE, CALCIUM CHLORIDE 600; 310; 30; 20 MG/100ML; MG/100ML; MG/100ML; MG/100ML
125 INJECTION, SOLUTION INTRAVENOUS CONTINUOUS
Status: DISCONTINUED | OUTPATIENT
Start: 2018-12-24 | End: 2018-12-24

## 2018-12-24 RX ORDER — BISACODYL 10 MG
10 SUPPOSITORY, RECTAL RECTAL DAILY
Status: DISPENSED | OUTPATIENT
Start: 2018-12-24 | End: 2018-12-27

## 2018-12-24 RX ORDER — MUPIROCIN 20 MG/G
1 OINTMENT TOPICAL 2 TIMES DAILY
Status: ACTIVE | OUTPATIENT
Start: 2018-12-24 | End: 2018-12-29

## 2018-12-24 RX ORDER — OXYCODONE HYDROCHLORIDE 5 MG/1
5 TABLET ORAL ONCE AS NEEDED
Status: DISCONTINUED | OUTPATIENT
Start: 2018-12-24 | End: 2018-12-24

## 2018-12-24 RX ORDER — HYDROCODONE BITARTRATE AND ACETAMINOPHEN 5; 325 MG/1; MG/1
1 TABLET ORAL EVERY 4 HOURS PRN
Status: DISCONTINUED | OUTPATIENT
Start: 2018-12-24 | End: 2019-01-02 | Stop reason: HOSPADM

## 2018-12-24 RX ADMIN — NEOSTIGMINE METHYLSULFATE 5 MG: 1 INJECTION INTRAVENOUS at 02:12

## 2018-12-24 RX ADMIN — SUCCINYLCHOLINE CHLORIDE 140 MG: 20 INJECTION, SOLUTION INTRAMUSCULAR; INTRAVENOUS at 11:12

## 2018-12-24 RX ADMIN — SODIUM CHLORIDE, SODIUM LACTATE, POTASSIUM CHLORIDE, AND CALCIUM CHLORIDE: .6; .31; .03; .02 INJECTION, SOLUTION INTRAVENOUS at 10:12

## 2018-12-24 RX ADMIN — PHENYLEPHRINE HYDROCHLORIDE 100 MCG: 10 INJECTION INTRAVENOUS at 12:12

## 2018-12-24 RX ADMIN — FAMOTIDINE 20 MG: 20 TABLET ORAL at 09:12

## 2018-12-24 RX ADMIN — CEFAZOLIN 2 G: 1 INJECTION, POWDER, FOR SOLUTION INTRAVENOUS at 11:12

## 2018-12-24 RX ADMIN — ROCURONIUM BROMIDE 10 MG: 10 INJECTION, SOLUTION INTRAVENOUS at 11:12

## 2018-12-24 RX ADMIN — GLYCOPYRROLATE 0.4 MG: 0.2 INJECTION, SOLUTION INTRAMUSCULAR; INTRAVENOUS at 02:12

## 2018-12-24 RX ADMIN — DEXTROSE AND SODIUM CHLORIDE: 5; .45 INJECTION, SOLUTION INTRAVENOUS at 04:12

## 2018-12-24 RX ADMIN — PHENYLEPHRINE HYDROCHLORIDE 100 MCG: 10 INJECTION INTRAVENOUS at 01:12

## 2018-12-24 RX ADMIN — GLYCOPYRROLATE 0.2 MG: 0.2 INJECTION, SOLUTION INTRAMUSCULAR; INTRAVENOUS at 11:12

## 2018-12-24 RX ADMIN — FENTANYL CITRATE 50 MCG: 50 INJECTION, SOLUTION INTRAMUSCULAR; INTRAVENOUS at 11:12

## 2018-12-24 RX ADMIN — DEXAMETHASONE SODIUM PHOSPHATE 8 MG: 4 INJECTION, SOLUTION INTRAMUSCULAR; INTRAVENOUS at 11:12

## 2018-12-24 RX ADMIN — ONDANSETRON 4 MG: 2 INJECTION, SOLUTION INTRAMUSCULAR; INTRAVENOUS at 11:12

## 2018-12-24 RX ADMIN — TRANEXAMIC ACID 950 MG: 100 INJECTION, SOLUTION INTRAVENOUS at 12:12

## 2018-12-24 RX ADMIN — PROPOFOL 100 MG: 10 INJECTION, EMULSION INTRAVENOUS at 11:12

## 2018-12-24 RX ADMIN — CEFAZOLIN SODIUM 1 G: 1 SOLUTION INTRAVENOUS at 09:12

## 2018-12-24 RX ADMIN — EPHEDRINE SULFATE 10 MG: 50 INJECTION, SOLUTION INTRAMUSCULAR; INTRAVENOUS; SUBCUTANEOUS at 01:12

## 2018-12-24 RX ADMIN — SODIUM CHLORIDE, SODIUM LACTATE, POTASSIUM CHLORIDE, AND CALCIUM CHLORIDE: .6; .31; .03; .02 INJECTION, SOLUTION INTRAVENOUS at 01:12

## 2018-12-24 RX ADMIN — MUPIROCIN 1 G: 20 OINTMENT TOPICAL at 09:12

## 2018-12-24 RX ADMIN — EPHEDRINE SULFATE 15 MG: 50 INJECTION, SOLUTION INTRAMUSCULAR; INTRAVENOUS; SUBCUTANEOUS at 02:12

## 2018-12-24 RX ADMIN — LIDOCAINE HYDROCHLORIDE 75 MG: 20 INJECTION, SOLUTION INTRAVENOUS at 11:12

## 2018-12-24 NOTE — PLAN OF CARE
Problem: Adult Inpatient Plan of Care  Goal: Plan of Care Review  Outcome: Ongoing (interventions implemented as appropriate)  Plan of care reviewed with pt and son they verbalized understanding. Pt is to be NPO at midnight sx tomorrow scheduled.  Pt has sitter and AVASYS present. Uses urinal at bedside left hip is in immobilizer will cont to turn Q 2 hours. Call light within reach will cont to monitor.

## 2018-12-24 NOTE — PT/OT/SLP PROGRESS
Occupational Therapy      Patient Name:  Adrian Osorio   MRN:  [id number]    OT order received and chart reviewed. Pt with a hip dislocation and is scheduled for surgery today.  Will follow-up once new orders are placed after surgery.    MYA Bethea  12/24/2018

## 2018-12-24 NOTE — BRIEF OP NOTE
[facility]  Brief Operative Note    SUMMARY     Surgery Date: 12/24/2018     Surgeon(s) and Role:     * Brando Neves MD - Primary    Assisting Surgeon: None    Pre-op Diagnosis:  Hip dislocation, left [S73.005A]    Post-op Diagnosis:  Post-Op Diagnosis Codes:     * Hip dislocation, left [S73.005A]    Procedure(s) (LRB):  REVISION, TOTAL ARTHROPLASTY, HIP (Left)    Anesthesia: General    Description of Procedure: Revision to man    Description of the findings of the procedure: dictated [id number]    Estimated Blood Loss: 100 mL         Specimens:   Specimen (12h ago, onward)    None

## 2018-12-24 NOTE — PLAN OF CARE
Problem: Adult Inpatient Plan of Care  Goal: Plan of Care Review  Outcome: Ongoing (interventions implemented as appropriate)  Plan of care reviewed with patient and son whom is MPOA they verbalized understanding. S/P left hip KAYLEE brace in place pulses present. Cont on IV ABT no ADR noted. Family at bedside Call light within reach will cont to monitor. Ice on left hip. VSS.

## 2018-12-24 NOTE — PLAN OF CARE
12/24/18 0811   PRE-TX-O2-ETCO2   O2 Device (Oxygen Therapy) room air   SpO2 (!) 92 %   Pulse Oximetry Type Intermittent   $ Pulse Oximetry - Multiple Charge Pulse Oximetry - Multiple   Pulse 75   Resp 18

## 2018-12-24 NOTE — ANESTHESIA PREPROCEDURE EVALUATION
12/24/2018  Adrian Osorio is a 85 y.o., male.    Pre-op Assessment    I have reviewed the Patient Summary Reports.     I have reviewed the Nursing Notes.   I have reviewed the Medications.     Review of Systems  Anesthesia Hx:  No problems with previous Anesthesia    Social:  No Alcohol Use    Hematology/Oncology:  Hematology Normal   Oncology Normal     EENT/Dental:EENT/Dental Normal   Cardiovascular:   Hypertension, well controlled CAD   CHF EF 40%   Pulmonary:  Pulmonary Normal    Renal/:  Renal/ Normal     Hepatic/GI:  Hepatic/GI Normal    Neurological:  Dementia    Endocrine:  Endocrine Normal    Psych:   Psychiatric History          Physical Exam  General:  Well nourished    Airway/Jaw/Neck:  Airway Findings: Mouth Opening: Normal Tongue: Normal  General Airway Assessment: Adult  Mallampati: I     Eyes/Ears/Nose:  EYES/EARS/NOSE FINDINGS: Normal   Dental:  Dental Findings: Edentulous   Chest/Lungs:  Chest/Lungs Findings: Clear to auscultation, Normal Respiratory Rate     Heart/Vascular:  Heart Findings: Rate: Normal  Rhythm: Regular Rhythm        Mental Status:  Mental Status Findings:  Confusion, Cooperative         Anesthesia Plan  Type of Anesthesia, risks & benefits discussed:  Anesthesia Type:  general  Patient's Preference:   Intra-op Monitoring Plan: standard ASA monitors  Intra-op Monitoring Plan Comments:   Post Op Pain Control Plan: IV/PO Opioids PRN and multimodal analgesia  Post Op Pain Control Plan Comments:   Induction:   IV  Beta Blocker:  Patient is on a Beta-Blocker and has received one dose within the past 24 hours (No further documentation required).       Informed Consent: Patient representative understands risks and agrees with Anesthesia plan.  Questions answered. Anesthesia consent signed with patient representative.  ASA Score: 3     Day of Surgery Review of History  & Physical:    H&P update referred to the surgeon.         Ready For Surgery From Anesthesia Perspective.

## 2018-12-24 NOTE — PROGRESS NOTES
Progress Note  Hospital Medicine  Patient Name:Adrian Osorio  MRN:  [id number]  Patient Class: IP- Inpatient  Admit Date: 12/21/2018  Length of Stay: 3 days  Expected Discharge Date:   Attending Physician: Pascale Nash MD  Primary Care Provider:  Martín Perales MD    SUBJECTIVE:     Principal Problem: Hip dislocation, left, initial encounter  Initial history of present illness: Mr. Osorio is an 84yo M with a PMH of Dementia, HTN, CAD/CABG 2008, CHF, ICMO with EF 35%. He presents to the ED with c/o left hip pain. Patient had a left hip arthroplasty secondary to a femoral neck fracture in October 2018. He was evaluated on 12/17/2018 and yesterday for left hip dislocation. The dislocation was reduced in the ED and he was placed in a abduction brace. The plan was to have a repair for instability done after Zeny.  He returns today with again L hip dislocation.  He is admitted to the service of hospital medicine and will be evaluated by the orthopedic surgeon.  Pt is a poor historian; information is obtained from ED and past medical records.    PMH/PSH/SH/FH/Meds: reviewed.    Symptoms/Review of Systems: Scheduled hip surgery today. No shortness of breath, cough, chest pain or headache, fever or abdominal pain.     Diet:  NPO  Activity level: Up with assistance  Pain:  Patient reports no pain.       OBJECTIVE:   Vital Signs (Most Recent):      Temp: 98.6 °F (37 °C) (12/24/18 0945)  Pulse: 71 (12/24/18 0945)  Resp: 18 (12/24/18 0945)  BP: (!) 162/75 (12/24/18 0945)  SpO2: 95 % (12/24/18 0945)       Vital Signs Range (Last 24H):  Temp:  [96.4 °F (35.8 °C)-98.6 °F (37 °C)]   Pulse:  [71-79]   Resp:  [18-20]   BP: (137-171)/(58-75)   SpO2:  [92 %-96 %]     Weight: 94.3 kg (208 lb)  Body mass index is 28.21 kg/m².    Intake/Output Summary (Last 24 hours) at 12/24/2018 1029  Last data filed at 12/24/2018 0500  Gross per 24 hour   Intake 495 ml   Output 925 ml   Net -430 ml     Physical  Examination:  Constitutional: He appears well-developed and well-nourished.   HENT:   Head: Normocephalic and atraumatic.   Eyes: Conjunctivae and EOM are normal. Pupils are equal, round, and reactive to light.   Neck: Normal range of motion. Neck supple. No JVD present.   Cardiovascular: Normal rate, regular rhythm, normal heart sounds and intact distal pulses.   Pulmonary/Chest: Effort normal and breath sounds normal. No stridor. No respiratory distress.   Abdominal: Soft. Bowel sounds are normal. He exhibits no distension. There is no tenderness.   Genitourinary:   Genitourinary Comments: deferred   Musculoskeletal: He exhibits tenderness (left hip).   Immobilizer in use to L hip   Neurological: He is alert.   Mild confusion   Skin: Skin is warm and dry. Capillary refill takes 2 to 3 seconds.   Psychiatric: He has a normal mood and affect. His behavior is normal. Judgment normal.   Vitals reviewed.  CRANIAL NERVES   CN III, IV, VI   Pupils are equal, round, and reactive to light.  Extraocular motions are normal.      CBC:  Recent Labs   Lab 12/17/18  1246 12/21/18  1608 12/24/18  0528   WBC 8.60 6.10 5.80   RBC 4.12* 3.39* 3.56*   HGB 12.2* 9.8* 10.6*   HCT 37.6* 30.5* 31.8*   * 343 319   MCV 91 90 89   MCH 29.7 29.0 29.7   MCHC 32.5 32.3 33.2   BMP  Recent Labs   Lab 12/17/18  1246 12/21/18  1608 12/24/18  0528   * 77 83    138 138   K 4.1 4.2 4.0    109 105   CO2 24 22* 25   BUN 14 13 10   CREATININE 0.9 0.8 0.8   CALCIUM 9.3 8.2* 8.5*      Diagnostic Results:  Microbiology Results (last 7 days)     ** No results found for the last 168 hours. **         Left hip x-ray: Left hip dislocation    Left hip x-ray: Interval reduction of the superolateral dislocation.    Assessment/Plan:     * Hip dislocation, left, initial encounter     Patient is scheduled for surgery today by Dr. Neves.      Essential hypertension     Chronic/Controlled. Continue chronic medications, adjusting as  needed.      Chronic systolic heart failure/ ICMO EF 40%     Stable.  Continue BB, diuretic, and ARB  Last Echo 10/24/18: The estimated LV ejection fraction is 40 %. LV systolic function is mildly to moderately reduced. Diastolic function is indeterminate.      Alzheimer's dementia without behavioral disturbance     Stable. Continue Namenda, Aricept, and Seroquel.  Delirium precautions.  Fall precautions     VTE Risk Mitigation (From admission, onward)        Ordered     IP VTE HIGH RISK PATIENT  Once      12/21/18 1938     enoxaparin injection 40 mg  Every 24 hours (non-standard times)      12/21/18 2043        Pascale Nash MD  Department of Hospital Medicine   [facility]

## 2018-12-24 NOTE — PLAN OF CARE
Problem: Adult Inpatient Plan of Care  Goal: Plan of Care Review  Outcome: Ongoing (interventions implemented as appropriate)  Plan of care reviewed with pt, pt verbalized understanding.  PIV clean dry and intact. Hourly/Q2 hourly rounds completed throughout shift. PT disoriented to place, time, and situation. Comfort level established. Good Pain control with PRN meds. avasys in place/sitter at bedside.  Urinal at bedside. Repositioned q2 as tolerated.   Pt has remained free from fall/injury. No skin breakdown noted. Bed in lowest position, brakes locked, call light within reach, SR^x2 for pt safety. Needs attended to, will continue to monitor.

## 2018-12-24 NOTE — OR NURSING
Pt. Received in hospital bed and transferred to DSU for left hip surgery.  Pt. Is alert and oriented to self, confused about day, date, situation due to dementia.  Daughter and son at bedside.  Pt. Denies nausea, pain is tolerable at 4/10 but pt. Cries out if left leg/hip is moved.  Dr. Zarate, anesthesia, evaluated pt. And discussed sedation with family members who voiced understanding.  RN reviewed plan of care with pt. And family members.  Pt. Voiced understanding that he is having surgery to stabilize his left hip. Family members voiced understanding of unit procedures and plan of care.  Questions answered, comfort assured. Pt. Prepared for surgery.

## 2018-12-24 NOTE — OP NOTE
DATE OF PROCEDURE:  12/24/2018.    ATTENDING PHYSICIAN:  Brando Neves M.D.    FIRST ASSISTANT:  Lei Denney.    PREOPERATIVE DIAGNOSIS:  Unstable left hemiarthroplasty.    POSTOPERATIVE DIAGNOSIS:  Unstable left hemiarthroplasty.    PROCEDURE PERFORMED:  Revision hemiarthroplasty to left total hip arthroplasty.    ESTIMATED BLOOD LOSS:  200 mL.    INTRAVENOUS FLUID:  Crystalloid.    ANESTHESIA:  General anesthesia.    PROCEDURE IN DETAIL:  The patient was placed on the operating table in the   lateral decubitus position.  He was prepped and draped in the usual sterile   manner for surgery.  His old posterolateral incision was utilized and carried   down sharply through the skin.  The deep fascia was visualized and opened in   line with its fibers.  There was a significant hematoma, which was irrigated   free.  Cultures were taken.  Sciatic nerve was palpated and the Charnley   retractors were placed.  The hip was ranged.  It did become unstable at 60   degrees of flexion and 45 degrees of internal rotation.  The head was removed.    We pulse and irrigated.  The stem was well fixed.  We cleaned out the acetabulum   and minimally medialized.  We then began reaming.  We reamed up to the point   where we got good bleeding bone.  We now tapped a multi-hole cup into position.    At this point, we placed three screws in the cup.  All obtained outstanding   bites.  We now tapped a trial into position.  We trialled the construct.  We   went up to a +12 but with a +12, we had excellent stability.  Because the   gentleman is demented, I felt that the safest thing to do would be to put a   constrained liner in.  Therefore, we removed the trial cup and irrigated and   then tapped the constrained liner into position.  Excellent stability was noted   with a constrained liner in position, really could not be dislocated.  We now   tapped the ring into position and it locked firmly.  We copiously irrigated.  We   closed the deep  fascia with #2 FiberWire and a running #1 Vicryl.  The sciatic   nerve was palpated and noted to be in good condition.  We closed the subQ with   2-0 Vicryl and the skin with PDS.  Sterile dressings were applied and the   patient was noted to be in stable condition.      SF/IN  dd: 12/24/2018 14:06:52 (CST)  td: 12/24/2018 15:11:55 (CST)  Doc ID   #[id number]  Job ID #[id number]    CC:

## 2018-12-24 NOTE — PLAN OF CARE
Pt aaox4, pt is only oriented to self, back to preop status as per Dr. Zarate, dressing cdi, updated children, pt released by anesthesia to transfer to floor, report given to SUMIT Infante

## 2018-12-24 NOTE — ANESTHESIA POSTPROCEDURE EVALUATION
Anesthesia Post Evaluation    Patient: Adrian Osorio    Procedure(s) Performed: Procedure(s) (LRB):  REVISION, TOTAL ARTHROPLASTY, HIP (Left)    Final Anesthesia Type: general  Patient location during evaluation: PACU  Patient participation: Yes- Able to Participate  Level of consciousness: awake and alert and confused (pt at baseline )  Post-procedure vital signs: reviewed and stable  Pain management: adequate  Airway patency: patent  PONV status at discharge: No PONV  Anesthetic complications: no      Cardiovascular status: hemodynamically stable  Respiratory status: unassisted and room air  Hydration status: euvolemic  Follow-up not needed.        Visit Vitals  /69   Pulse 68   Temp 36.7 °C (98 °F) (Tympanic)   Resp 20   Ht 6' (1.829 m)   Wt 94.3 kg (208 lb)   SpO2 97%   BMI 28.21 kg/m²       Pain/Kurt Score: Pain Rating Prior to Med Admin: 10 (12/23/2018 10:08 PM)  Pain Rating Post Med Admin: 2 (12/23/2018 12:44 PM)  Ukrt Score: 7 (12/24/2018  2:22 PM)

## 2018-12-25 LAB
ALBUMIN SERPL BCP-MCNC: 2 G/DL
ALP SERPL-CCNC: 74 U/L
ALT SERPL W/O P-5'-P-CCNC: 13 U/L
ANION GAP SERPL CALC-SCNC: 10 MMOL/L
AST SERPL-CCNC: 18 U/L
BASOPHILS # BLD AUTO: 0 K/UL
BASOPHILS NFR BLD: 0.2 %
BILIRUB SERPL-MCNC: 0.4 MG/DL
BUN SERPL-MCNC: 18 MG/DL
CALCIUM SERPL-MCNC: 7.9 MG/DL
CHLORIDE SERPL-SCNC: 102 MMOL/L
CO2 SERPL-SCNC: 21 MMOL/L
CREAT SERPL-MCNC: 1 MG/DL
DIFFERENTIAL METHOD: ABNORMAL
EOSINOPHIL # BLD AUTO: 0 K/UL
EOSINOPHIL NFR BLD: 0 %
ERYTHROCYTE [DISTWIDTH] IN BLOOD BY AUTOMATED COUNT: 14.7 %
EST. GFR  (AFRICAN AMERICAN): >60 ML/MIN/1.73 M^2
EST. GFR  (NON AFRICAN AMERICAN): >60 ML/MIN/1.73 M^2
GLUCOSE SERPL-MCNC: 174 MG/DL
HCT VFR BLD AUTO: 27.3 %
HGB BLD-MCNC: 9 G/DL
LYMPHOCYTES # BLD AUTO: 0.9 K/UL
LYMPHOCYTES NFR BLD: 12.8 %
MCH RBC QN AUTO: 29.3 PG
MCHC RBC AUTO-ENTMCNC: 32.9 G/DL
MCV RBC AUTO: 89 FL
MONOCYTES # BLD AUTO: 0.5 K/UL
MONOCYTES NFR BLD: 6.8 %
NEUTROPHILS # BLD AUTO: 5.9 K/UL
NEUTROPHILS NFR BLD: 80.2 %
PLATELET # BLD AUTO: 326 K/UL
PMV BLD AUTO: 7 FL
POTASSIUM SERPL-SCNC: 3.9 MMOL/L
PROT SERPL-MCNC: 5.5 G/DL
RBC # BLD AUTO: 3.07 M/UL
SODIUM SERPL-SCNC: 133 MMOL/L
WBC # BLD AUTO: 7.4 K/UL

## 2018-12-25 PROCEDURE — S0179 MEGESTROL 20 MG: HCPCS | Performed by: INTERNAL MEDICINE

## 2018-12-25 PROCEDURE — 25000003 PHARM REV CODE 250: Performed by: INTERNAL MEDICINE

## 2018-12-25 PROCEDURE — 94761 N-INVAS EAR/PLS OXIMETRY MLT: CPT

## 2018-12-25 PROCEDURE — G8979 MOBILITY GOAL STATUS: HCPCS | Mod: CK

## 2018-12-25 PROCEDURE — 99232 SBSQ HOSP IP/OBS MODERATE 35: CPT | Mod: ,,, | Performed by: INTERNAL MEDICINE

## 2018-12-25 PROCEDURE — 80053 COMPREHEN METABOLIC PANEL: CPT

## 2018-12-25 PROCEDURE — 99232 PR SUBSEQUENT HOSPITAL CARE,LEVL II: ICD-10-PCS | Mod: ,,, | Performed by: INTERNAL MEDICINE

## 2018-12-25 PROCEDURE — G8982 BODY POS GOAL STATUS: HCPCS | Mod: CK

## 2018-12-25 PROCEDURE — 25000003 PHARM REV CODE 250: Performed by: ORTHOPAEDIC SURGERY

## 2018-12-25 PROCEDURE — 63600175 PHARM REV CODE 636 W HCPCS: Performed by: INTERNAL MEDICINE

## 2018-12-25 PROCEDURE — G8981 BODY POS CURRENT STATUS: HCPCS | Mod: CN

## 2018-12-25 PROCEDURE — 27000221 HC OXYGEN, UP TO 24 HOURS

## 2018-12-25 PROCEDURE — 36415 COLL VENOUS BLD VENIPUNCTURE: CPT

## 2018-12-25 PROCEDURE — 97161 PT EVAL LOW COMPLEX 20 MIN: CPT

## 2018-12-25 PROCEDURE — 12000002 HC ACUTE/MED SURGE SEMI-PRIVATE ROOM

## 2018-12-25 PROCEDURE — 63600175 PHARM REV CODE 636 W HCPCS: Performed by: ORTHOPAEDIC SURGERY

## 2018-12-25 PROCEDURE — G8978 MOBILITY CURRENT STATUS: HCPCS | Mod: CN

## 2018-12-25 PROCEDURE — 85025 COMPLETE CBC W/AUTO DIFF WBC: CPT

## 2018-12-25 RX ORDER — CEFAZOLIN SODIUM 1 G/50ML
1 SOLUTION INTRAVENOUS
Status: COMPLETED | OUTPATIENT
Start: 2018-12-25 | End: 2018-12-25

## 2018-12-25 RX ORDER — ENOXAPARIN SODIUM 100 MG/ML
40 INJECTION SUBCUTANEOUS
Status: DISCONTINUED | OUTPATIENT
Start: 2018-12-25 | End: 2018-12-26

## 2018-12-25 RX ADMIN — MUPIROCIN 1 G: 20 OINTMENT TOPICAL at 09:12

## 2018-12-25 RX ADMIN — ENOXAPARIN SODIUM 40 MG: 100 INJECTION SUBCUTANEOUS at 03:12

## 2018-12-25 RX ADMIN — ZOLPIDEM TARTRATE 5 MG: 5 TABLET ORAL at 10:12

## 2018-12-25 RX ADMIN — CEFAZOLIN SODIUM 1 G: 1 SOLUTION INTRAVENOUS at 06:12

## 2018-12-25 RX ADMIN — FAMOTIDINE 20 MG: 20 TABLET ORAL at 09:12

## 2018-12-25 RX ADMIN — MUPIROCIN 1 G: 20 OINTMENT TOPICAL at 08:12

## 2018-12-25 RX ADMIN — BISACODYL 10 MG: 10 SUPPOSITORY RECTAL at 09:12

## 2018-12-25 RX ADMIN — FAMOTIDINE 20 MG: 20 TABLET ORAL at 08:12

## 2018-12-25 RX ADMIN — CEFAZOLIN SODIUM 1 G: 1 SOLUTION INTRAVENOUS at 05:12

## 2018-12-25 RX ADMIN — CEFAZOLIN SODIUM 1 G: 1 SOLUTION INTRAVENOUS at 03:12

## 2018-12-25 RX ADMIN — HYDROCODONE BITARTRATE AND ACETAMINOPHEN 1 TABLET: 5; 325 TABLET ORAL at 08:12

## 2018-12-25 RX ADMIN — MEGESTROL ACETATE 200 MG: 40 SUSPENSION ORAL at 09:12

## 2018-12-25 NOTE — PLAN OF CARE
Problem: Adult Inpatient Plan of Care  Goal: Plan of Care Review  Pt. Awakens briefly to eat or drink and falls back to sleep. Reposition slightly to offset weight.IV site wnl.Brace to left leg as ordered. Daughter staying night with pt. Left forearm IV leaking so moved IV over to right arm IV site. SUZANNA drain with bright red drainage at present. Vital signs wnl.Berman patent draining light yellow clear urine. Explained to daughter that I would have to remove berman in a.m.

## 2018-12-25 NOTE — PLAN OF CARE
Problem: Physical Therapy Goal  Goal: Physical Therapy Goal  Goals to be met by: 2019     Patient will increase functional independence with mobility by performin). Supine to sit with Stand-by Assistance  2). Sit to supine with Stand-by Assistance  3). Sit to stand transfer with Contact Guard Assistance  4). Bed to chair transfer with Contact Guard Assistance using Rolling Walker  5). Gait  x  > 50 feet with Contact Guard Assistance using Rolling Walker.     Outcome: Ongoing (interventions implemented as appropriate)  Initial Evaluation completed.

## 2018-12-25 NOTE — PT/OT/SLP EVAL
Physical Therapy Evaluation    Patient Name:  Adrian Osorio   MRN:  [id number]    Recommendations:     Discharge Recommendations:    TBD  Discharge Equipment Recommendations:   TBD  Barriers to discharge:     Assessment:     Adrian Osorio is a 85 y.o. male admitted with a medical diagnosis of Hip dislocation, left, initial encounter.  He presents with the following impairments/functional limitations:  weakness, impaired functional mobilty, gait instability, impaired balance, decreased lower extremity function, pain, orthopedic precautions  .    Rehab Prognosis: Good and Fair; patient would benefit from acute skilled PT services to address these deficits and reach maximum level of function.    Recent Surgery: Procedure(s) (LRB):  REVISION, TOTAL ARTHROPLASTY, HIP (Left) 1 Day Post-Op    Plan:     During this hospitalization, patient to be seen BID to address the identified rehab impairments via gait training, therapeutic activities, therapeutic exercises and progress toward the following goals:    · Plan of Care Expires:   01/05/2019    Subjective     Pain/Comfort:  · Pain Rating 1: 4/10  · Location - Side 1: Left  · Location 1: hip  · Pain Addressed 1: Reposition, Distraction, Cessation of Activity  · Pain Rating Post-Intervention 1: 5/10    Living Environment:  House with dgtr, I small step to enter.  Prior to admission, patients level of function was ?.  Equipment used at home:  .  DME owned (not currently used):   Upon discharge, patient will have assistance from dgtr.    Objective:     Communicated with RN prior to session, MD present at beginning.  Patient found all lines intact and bed alarm on hip abduction brace, SUZANNA drain, oxygen, peripheral IV  upon PT entry to room.    General Precautions: Standard, fall   Orthopedic Precautions:LLE weight bearing as tolerated, LLE posterior precautions   Braces: Hip abduction brace (strap came loose during supine to sit, RN (Paul) notified, unable to  reassemble).    Exams:  · LLE ROM: hip N/T due to post hip precautions  · LLE Strength: HIP 2-/5    Functional Mobility:  · Bed Mobility:     · Rolling Left:  moderate assistance  · Supine to Sit: moderate assistance and maximal assistance  · Transfers:     · Sit to Stand:  moderate assistance with rolling walker  · Gait: unable due to poor balance/strength  · Balance: Static stand: Poor (2/5)      Therapeutic Activities and Exercises:   Static stand at bedside with RW with mod assist and cues x ~ 2 min.    AM-PAC 6 CLICK MOBILITY  Total Score:10     Patient left supine with all lines intact, call button in reach, RN notified, RN (Paul) present and O2 to wall 2 L/min via nc. Hip abd brace in place, but strap is loose..    GOALS:   Multidisciplinary Problems     Physical Therapy Goals        Problem: Physical Therapy Goal    Goal Priority Disciplines Outcome Goal Variances Interventions   Physical Therapy Goal     PT, PT/OT Ongoing (interventions implemented as appropriate)     Description:  Goals to be met by: 2019     Patient will increase functional independence with mobility by performin). Supine to sit with Stand-by Assistance  2). Sit to supine with Stand-by Assistance  3). Sit to stand transfer with Contact Guard Assistance  4). Bed to chair transfer with Contact Guard Assistance using Rolling Walker  5). Gait  x  > 50 feet with Contact Guard Assistance using Rolling Walker.                       History:     Past Medical History:   Diagnosis Date    CHF (congestive heart failure)     Coronary artery disease     Hypertension        Past Surgical History:   Procedure Laterality Date    CARDIAC SURGERY      cabg    HEMIARTHROPLASTY, HIP Left 10/29/2018    Performed by rBando Neves MD at [facility]       Clinical Decision Making:     History  Co-morbidities and personal factors that may impact the plan of care Examination  Body Structures and Functions, activity limitations and participation  restrictions that may impact the plan of care Clinical Presentation   Decision Making/ Complexity Score   Co-morbidities:   [] Time since onset of injury / illness / exacerbation  [] Status of current condition  []Patient's cognitive status and safety concerns    [] Multiple Medical Problems (see med hx)  Personal Factors:   [] Patient's age  [] Prior Level of function   [] Patient's home situation (environment and family support)  [] Patient's level of motivation  [] Expected progression of patient      HISTORY:(criteria)    [] 24088 - no personal factors/history    [] 12466 - has 1-2 personal factor/comorbidity     [] 64862 - has >3 personal factor/comorbidity     Body Regions:  [] Objective examination findings  [] Head     []  Neck  [] Trunk   [] Upper Extremity  [] Lower Extremity    Body Systems:  [] For communication ability, affect, cognition, language, and learning style: the assessment of the ability to make needs known, consciousness, orientation (person, place, and time), expected emotional /behavioral responses, and learning preferences (eg, learning barriers, education  needs)  [] For the neuromuscular system: a general assessment of gross coordinated movement (eg, balance, gait, locomotion, transfers, and transitions) and motor function  (motor control and motor learning)  [] For the musculoskeletal system: the assessment of gross symmetry, gross range of motion, gross strength, height, and weight  [] For the integumentary system: the assessment of pliability(texture), presence of scar formation, skin color, and skin integrity  [] For cardiovascular/pulmonary system: the assessment of heart rate, respiratory rate, blood pressure, and edema     Activity limitations:    [] Patient's cognitive status and saf ety concerns          [] Status of current condition      [] Weight bearing restriction  [] Cardiopulmunary Restriction    Participation Restrictions:   [] Goals and goal agreement with the patient      [] Rehab potential (prognosis) and probable outcome      Examination of Body System: (criteria)    [] 23221 - addressing 1-2 elements    [] 83470 - addressing a total of 3 or more elements     [] 77514 -  Addressing a total of 4 or more elements         Clinical Presentation: (criteria)  Choose one     On examination of body system using standardized tests and measures patient presents with (CHOOSE ONE) elements from any of the following: body structures and functions, activity limitations, and/or participation restrictions.  Leading to a clinical presentation that is considered (CHOOSE ONE)                              Clinical Decision Making  (Eval Complexity):  Choose One     Time Tracking:     PT Received On: 12/25/18  PT Start Time: 0945     PT Stop Time: 1020  PT Total Time (min): 35 min     Billable Minutes: Evaluation 35      Chris Jeffrey, PT  12/25/2018

## 2018-12-25 NOTE — PLAN OF CARE
Problem: Adult Inpatient Plan of Care  Goal: Plan of Care Review  Outcome: Ongoing (interventions implemented as appropriate)  Pt alert and oriented to self. All medications given. Iv fluids infusing. Suppository given. No new symptoms. Plan of care reviewed with patient and family. Safety maintained. Will continue to monitor.

## 2018-12-25 NOTE — PLAN OF CARE
12/25/18 0903   Patient Assessment/Suction   Level of Consciousness (AVPU) responds to voice   PRE-TX-O2-ETCO2   O2 Device (Oxygen Therapy) nasal cannula   $ Is the patient on Low Flow Oxygen? Yes   Flow (L/min) 3   Oxygen Concentration (%) 32   SpO2 98 %   Pulse Oximetry Type Intermittent   $ Pulse Oximetry - Multiple Charge Pulse Oximetry - Multiple   Pulse 70   Resp 18   Ready to Wean/Extubation Screen   FIO2<=50 (chart decimal) 0.32

## 2018-12-25 NOTE — PROGRESS NOTES
Progress Note  Hospital Medicine  Patient Name:Adrian Osorio  MRN:  [id number]  Patient Class: IP- Inpatient  Admit Date: 12/21/2018  Length of Stay: 4 days  Expected Discharge Date:   Attending Physician: Pascale Nash MD  Primary Care Provider:  Martín Perales MD    SUBJECTIVE:     Principal Problem: Hip dislocation, left, initial encounter  Initial history of present illness: Mr. 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 He presents to the ED with c/o left hip pain. Patient had a left hip arthroplasty secondary to a femoral neck fracture in October 2018. He was evaluated on 12/17/2018 and yesterday for left hip dislocation. The dislocation was reduced in the ED and he was placed in a abduction brace. The plan was to have a repair for instability done after Zeny.  He returns today with again L hip dislocation.  He is admitted to the service of [facility] medicine and will be evaluated by the orthopedic surgeon.  Pt is a poor historian; information is obtained from ED and past medical records.    PMH/PSH/SH/FH/Meds: reviewed.    Symptoms/Review of Systems: s/p left hip arthroplasty revision. Await PT and OT evaluation. No shortness of breath, cough, chest pain or headache, fever or abdominal pain.     Diet:  cardiac  Activity level: Up with assistance  Pain:  Patient reports no pain.       OBJECTIVE:   Vital Signs (Most Recent):      Temp: 98.4 °F (36.9 °C) (12/25/18 0746)  Pulse: 69 (12/25/18 0746)  Resp: 18 (12/25/18 0746)  BP: 121/66 (12/25/18 0746)  SpO2: 99 % (12/25/18 0746)       Vital Signs Range (Last 24H):  Temp:  [97.6 °F (36.4 °C)-98.6 °F (37 °C)]   Pulse:  [64-77]   Resp:  [16-25]   BP: (105-162)/(52-75)   SpO2:  [94 %-99 %]     Weight: 94.3 kg (208 lb)  Body mass index is 28.21 kg/m².    Intake/Output Summary (Last 24 hours) at 12/25/2018 0903  Last data filed at 12/25/2018 0600  Gross per 24 hour   Intake 3500 ml   Output 1200 ml   Net  2300 ml     Physical Examination:  Constitutional: He appears well-developed and well-nourished.   HENT:   Head: Normocephalic and atraumatic.   Eyes: Conjunctivae and EOM are normal. Pupils are equal, round, and reactive to light.   Neck: Normal range of motion. Neck supple. No JVD present.   Cardiovascular: Normal rate, regular rhythm, normal heart sounds and intact distal pulses.   Pulmonary/Chest: Effort normal and breath sounds normal. No stridor. No respiratory distress.   Abdominal: Soft. Bowel sounds are normal. He exhibits no distension. There is no tenderness.   Skin: Skin is warm and dry. Capillary refill takes 2 to 3 seconds.   Psychiatric: He has a normal mood and affect. His behavior is normal. Judgment normal.   Ext: Left hip dressing C/D/I. Hip brace in place.  Vitals reviewed.  CRANIAL NERVES   CN III, IV, VI   Pupils are equal, round, and reactive to light.  Extraocular motions are normal.      CBC:  Recent Labs   Lab 12/21/18  1608 12/24/18  0528 12/24/18  1559   WBC 6.10 5.80 9.30   RBC 3.39* 3.56* 3.43*   HGB 9.8* 10.6* 10.0*   HCT 30.5* 31.8* 30.6*    319 312   MCV 90 89 89   MCH 29.0 29.7 29.2   MCHC 32.3 33.2 32.7   BMP  Recent Labs   Lab 12/21/18  1608 12/24/18  0528   GLU 77 83    138   K 4.2 4.0    105   CO2 22* 25   BUN 13 10   CREATININE 0.8 0.8   CALCIUM 8.2* 8.5*      Diagnostic Results:  Microbiology Results (last 7 days)     Procedure Component Value Units Date/Time    Culture, Anaerobic [[id number]] Collected:  12/24/18 1235    Order Status:  Sent Specimen:  Body Fluid from Hip, Left Updated:  12/24/18 1735    Aerobic culture [[id number]] Collected:  12/24/18 1235    Order Status:  Sent Specimen:  Body Fluid from Hip, Left Updated:  12/24/18 1735         Left hip x-ray: Left hip dislocation    Left hip x-ray: Interval reduction of the superolateral dislocation.    Assessment/Plan:     * s/p left total hip revision arthroplasty  Hip dislocation, left, initial  encounter     Continue to follow Orthopedic recommendations.  Needs aggressive incentive spirometry.  Follow hemoglobin and hematocrit closely.  Pain control with PO narcotics and antiemetics as needed.  Physical therapy as per Orthopedics protocol with fall precautions.     Essential hypertension     Chronic/Controlled. Continue chronic medications, adjusting as needed.      Chronic systolic heart failure/ ICMO EF 40%     Stable.  Continue BB, diuretic, and ARB  Last Echo 10/24/18: The estimated LV ejection fraction is 40 %. LV systolic function is mildly to moderately reduced. Diastolic function is indeterminate.      Alzheimer's dementia without behavioral disturbance     Stable. Continue Namenda, Aricept, and Seroquel.  Delirium precautions.  Fall precautions     VTE Risk Mitigation (From admission, onward)        Ordered     enoxaparin injection 40 mg  Every 24 hours (non-standard times)      12/25/18 3802        Pascale Nash MD  Department of Hospital Medicine   [facility]

## 2018-12-26 LAB
ALBUMIN SERPL BCP-MCNC: 1.9 G/DL
ALP SERPL-CCNC: 71 U/L
ALT SERPL W/O P-5'-P-CCNC: 10 U/L
ANION GAP SERPL CALC-SCNC: 5 MMOL/L
AST SERPL-CCNC: 21 U/L
BASOPHILS # BLD AUTO: 0 K/UL
BASOPHILS NFR BLD: 0 %
BILIRUB SERPL-MCNC: 0.3 MG/DL
BUN SERPL-MCNC: 15 MG/DL
CALCIUM SERPL-MCNC: 7.8 MG/DL
CHLORIDE SERPL-SCNC: 105 MMOL/L
CK MB SERPL-MCNC: 1.4 NG/ML
CK MB SERPL-MCNC: 1.5 NG/ML
CK MB SERPL-RTO: 1.9 %
CK MB SERPL-RTO: 1.9 %
CK SERPL-CCNC: 75 U/L
CK SERPL-CCNC: 77 U/L
CO2 SERPL-SCNC: 27 MMOL/L
CREAT SERPL-MCNC: 0.8 MG/DL
DIFFERENTIAL METHOD: ABNORMAL
EOSINOPHIL # BLD AUTO: 0.1 K/UL
EOSINOPHIL NFR BLD: 1.2 %
ERYTHROCYTE [DISTWIDTH] IN BLOOD BY AUTOMATED COUNT: 15.2 %
EST. GFR  (AFRICAN AMERICAN): >60 ML/MIN/1.73 M^2
EST. GFR  (NON AFRICAN AMERICAN): >60 ML/MIN/1.73 M^2
GLUCOSE SERPL-MCNC: 94 MG/DL
HCT VFR BLD AUTO: 26.3 %
HGB BLD-MCNC: 8.7 G/DL
LYMPHOCYTES # BLD AUTO: 1.4 K/UL
LYMPHOCYTES NFR BLD: 18.8 %
MCH RBC QN AUTO: 29.8 PG
MCHC RBC AUTO-ENTMCNC: 33.1 G/DL
MCV RBC AUTO: 90 FL
MONOCYTES # BLD AUTO: 0.7 K/UL
MONOCYTES NFR BLD: 10 %
NEUTROPHILS # BLD AUTO: 5.1 K/UL
NEUTROPHILS NFR BLD: 70 %
PLATELET # BLD AUTO: 330 K/UL
PMV BLD AUTO: 6.7 FL
POTASSIUM SERPL-SCNC: 3.6 MMOL/L
PROT SERPL-MCNC: 5.3 G/DL
RBC # BLD AUTO: 2.92 M/UL
SODIUM SERPL-SCNC: 137 MMOL/L
T4 FREE SERPL-MCNC: 0.93 NG/DL
TROPONIN I SERPL DL<=0.01 NG/ML-MCNC: 0.03 NG/ML
TROPONIN I SERPL DL<=0.01 NG/ML-MCNC: 0.03 NG/ML
TSH SERPL DL<=0.005 MIU/L-ACNC: 5.27 UIU/ML
WBC # BLD AUTO: 7.3 K/UL

## 2018-12-26 PROCEDURE — 63600175 PHARM REV CODE 636 W HCPCS: Performed by: INTERNAL MEDICINE

## 2018-12-26 PROCEDURE — 84443 ASSAY THYROID STIM HORMONE: CPT

## 2018-12-26 PROCEDURE — 82550 ASSAY OF CK (CPK): CPT

## 2018-12-26 PROCEDURE — 36415 COLL VENOUS BLD VENIPUNCTURE: CPT

## 2018-12-26 PROCEDURE — S0179 MEGESTROL 20 MG: HCPCS | Performed by: INTERNAL MEDICINE

## 2018-12-26 PROCEDURE — 12000002 HC ACUTE/MED SURGE SEMI-PRIVATE ROOM

## 2018-12-26 PROCEDURE — 84484 ASSAY OF TROPONIN QUANT: CPT | Mod: 91

## 2018-12-26 PROCEDURE — 84439 ASSAY OF FREE THYROXINE: CPT

## 2018-12-26 PROCEDURE — 99233 SBSQ HOSP IP/OBS HIGH 50: CPT | Mod: ,,, | Performed by: INTERNAL MEDICINE

## 2018-12-26 PROCEDURE — 85025 COMPLETE CBC W/AUTO DIFF WBC: CPT

## 2018-12-26 PROCEDURE — 86580 TB INTRADERMAL TEST: CPT | Performed by: INTERNAL MEDICINE

## 2018-12-26 PROCEDURE — 27000221 HC OXYGEN, UP TO 24 HOURS

## 2018-12-26 PROCEDURE — 94761 N-INVAS EAR/PLS OXIMETRY MLT: CPT

## 2018-12-26 PROCEDURE — 25000003 PHARM REV CODE 250: Performed by: INTERNAL MEDICINE

## 2018-12-26 PROCEDURE — 25000003 PHARM REV CODE 250: Performed by: ORTHOPAEDIC SURGERY

## 2018-12-26 PROCEDURE — 99233 PR SUBSEQUENT HOSPITAL CARE,LEVL III: ICD-10-PCS | Mod: ,,, | Performed by: INTERNAL MEDICINE

## 2018-12-26 PROCEDURE — 80053 COMPREHEN METABOLIC PANEL: CPT

## 2018-12-26 PROCEDURE — 82553 CREATINE MB FRACTION: CPT

## 2018-12-26 PROCEDURE — 93005 ELECTROCARDIOGRAM TRACING: CPT

## 2018-12-26 RX ORDER — CARVEDILOL 3.12 MG/1
3.12 TABLET ORAL 2 TIMES DAILY WITH MEALS
Status: DISCONTINUED | OUTPATIENT
Start: 2018-12-26 | End: 2018-12-28

## 2018-12-26 RX ORDER — NAPROXEN SODIUM 220 MG/1
81 TABLET, FILM COATED ORAL DAILY
Status: DISCONTINUED | OUTPATIENT
Start: 2018-12-26 | End: 2019-01-02 | Stop reason: HOSPADM

## 2018-12-26 RX ORDER — ENOXAPARIN SODIUM 100 MG/ML
1 INJECTION SUBCUTANEOUS EVERY 12 HOURS
Status: DISCONTINUED | OUTPATIENT
Start: 2018-12-26 | End: 2018-12-28

## 2018-12-26 RX ADMIN — ASPIRIN 81 MG CHEWABLE TABLET 81 MG: 81 TABLET CHEWABLE at 04:12

## 2018-12-26 RX ADMIN — MUPIROCIN 1 G: 20 OINTMENT TOPICAL at 10:12

## 2018-12-26 RX ADMIN — BISACODYL 10 MG: 10 SUPPOSITORY RECTAL at 09:12

## 2018-12-26 RX ADMIN — MEGESTROL ACETATE 200 MG: 40 SUSPENSION ORAL at 09:12

## 2018-12-26 RX ADMIN — FAMOTIDINE 20 MG: 20 TABLET ORAL at 09:12

## 2018-12-26 RX ADMIN — CARVEDILOL 3.12 MG: 3.12 TABLET, FILM COATED ORAL at 04:12

## 2018-12-26 RX ADMIN — MUPIROCIN 1 G: 20 OINTMENT TOPICAL at 09:12

## 2018-12-26 RX ADMIN — ENOXAPARIN SODIUM 90 MG: 100 INJECTION SUBCUTANEOUS at 04:12

## 2018-12-26 RX ADMIN — ENOXAPARIN SODIUM 40 MG: 100 INJECTION SUBCUTANEOUS at 02:12

## 2018-12-26 RX ADMIN — FAMOTIDINE 20 MG: 20 TABLET ORAL at 10:12

## 2018-12-26 RX ADMIN — TUBERCULIN PURIFIED PROTEIN DERIVATIVE 5 UNITS: 5 INJECTION, SOLUTION INTRADERMAL at 03:12

## 2018-12-26 NOTE — PROGRESS NOTES
Progress Note  Hospital Medicine  Patient Name:Adrian Osorio  MRN:  [id number]  Patient Class: IP- Inpatient  Admit Date: 12/21/2018  Length of Stay: 5 days  Expected Discharge Date:   Attending Physician: Pascale Nash MD  Primary Care Provider:  Martín Perales MD    SUBJECTIVE:     Principal Problem: Hip dislocation, left, initial encounter  Initial history of present illness: Mr. 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 He presents to the ED with c/o left hip pain. Patient had a left hip arthroplasty secondary to a femoral neck fracture in October 2018. He was evaluated on 12/17/2018 and yesterday for left hip dislocation. The dislocation was reduced in the ED and he was placed in a abduction brace. The plan was to have a repair for instability done after Zeny.  He returns today with again L hip dislocation.  He is admitted to the service of hospital medicine and will be evaluated by the orthopedic surgeon.  Pt is a poor historian; information is obtained from ED and past medical records.    PMH/PSH/SH/FH/Meds: reviewed.    Symptoms/Review of Systems: s/p left hip arthroplasty revision. Patient is very [place]. No shortness of breath, cough, chest pain or headache, fever or abdominal pain.     Diet:  cardiac  Activity level: Up with assistance  Pain:  Patient reports no pain.       OBJECTIVE:   Vital Signs (Most Recent):      Temp: 99.1 °F (37.3 °C) (12/26/18 0813)  Pulse: 73 (12/26/18 0813)  Resp: 16 (12/26/18 0813)  BP: (!) 153/71 (12/26/18 0813)  SpO2: 98 % (12/26/18 0813)       Vital Signs Range (Last 24H):  Temp:  [96.8 °F (36 °C)-99.1 °F (37.3 °C)]   Pulse:  [73-81]   Resp:  [16-18]   BP: (125-153)/(58-71)   SpO2:  [95 %-100 %]     Weight: 94.3 kg (208 lb)  Body mass index is 28.21 kg/m².    Intake/Output Summary (Last 24 hours) at 12/26/2018 0927  Last data filed at 12/26/2018 0400  Gross per 24 hour   Intake 300 ml   Output 540 ml   Net -240  hussein     Physical Examination:  Constitutional: He appears well-developed and well-nourished.   HENT:   Head: Normocephalic and atraumatic.   Eyes: Conjunctivae and EOM are normal. Pupils are equal, round, and reactive to light.   Neck: Normal range of motion. Neck supple. No JVD present.   Cardiovascular: Normal rate, regular rhythm, normal heart sounds and intact distal pulses.   Pulmonary/Chest: Effort normal and breath sounds normal. No stridor. No respiratory distress.   Abdominal: Soft. Bowel sounds are normal. He exhibits no distension. There is no tenderness.   Skin: Skin is warm and dry. Capillary refill takes 2 to 3 seconds.   Psychiatric: He has a normal mood and affect. His behavior is normal. Judgment normal.   Ext: Left hip dressing C/D/I. Hip brace in place.  Vitals reviewed.  CRANIAL NERVES   CN III, IV, VI   Pupils are equal, round, and reactive to light.  Extraocular motions are normal.      CBC:  Recent Labs   Lab 12/24/18  1559 12/25/18  0917 12/26/18  0549   WBC 9.30 7.40 7.30   RBC 3.43* 3.07* 2.92*   HGB 10.0* 9.0* 8.7*   HCT 30.6* 27.3* 26.3*    326 330   MCV 89 89 90   MCH 29.2 29.3 29.8   MCHC 32.7 32.9 33.1   BMP  Recent Labs   Lab 12/24/18  0528 12/25/18  0952 12/26/18  0549   GLU 83 174* 94    133* 137   K 4.0 3.9 3.6    102 105   CO2 25 21* 27   BUN 10 18 15   CREATININE 0.8 1.0 0.8   CALCIUM 8.5* 7.9* 7.8*      Diagnostic Results:  Microbiology Results (last 7 days)     Procedure Component Value Units Date/Time    Aerobic culture [[id number]] Collected:  12/24/18 1235    Order Status:  Completed Specimen:  Body Fluid from Hip, Left Updated:  12/26/18 0931     Aerobic Bacterial Culture No growth    Culture, Anaerobic [[id number]] Collected:  12/24/18 1235    Order Status:  Completed Specimen:  Body Fluid from Hip, Left Updated:  12/26/18 0713     Anaerobic Culture Culture in progress         Left hip x-ray: Left hip dislocation    Left hip x-ray: Interval reduction of the  superolateral dislocation.    Assessment/Plan:     * s/p left total hip revision arthroplasty  Hip dislocation, left, initial encounter     Continue to follow Orthopedic recommendations.  Needs aggressive incentive spirometry.  Follow hemoglobin and hematocrit closely.  Pain control with PO narcotics and antiemetics as needed.  Physical therapy as per Orthopedics protocol with fall precautions.     Essential hypertension     Chronic/Controlled. Continue chronic medications, adjusting as needed.      Chronic systolic heart failure/ ICMO EF 40%     Stable.  Continue BB, diuretic, and ARB  Last Echo 10/24/18: The estimated LV ejection fraction is 40 %. LV systolic function is mildly to moderately reduced. Diastolic function is indeterminate.      Alzheimer's dementia without behavioral disturbance     Stable. Continue Namenda, Aricept, and Seroquel.  Delirium precautions.  Fall precautions     Moderate malnutrition  Nutrition consulted. Encourage maximal PO intake. Diet supplementation ordered per nutrition approval. Will encourage PO and monitor closely for weight changes.    Acute surgical blood loss anemia  Follow CBC and transfuse as needed.    Patient would benefit with SNF placement. Discussed with CM.    VTE Risk Mitigation (From admission, onward)        Ordered     enoxaparin injection 40 mg  Every 24 hours (non-standard times)      12/25/18 1203        Pascale Nash MD  Department of Hospital Medicine   [facility]    Addendum 3:30 pm:  Patient has developed AF with RVR. Patient has prior history of low EF. Will change Lovenox to 1 mg/kg sq q 12 hrs, ASA 81 mg daily, PO Coreg, TTE and cardiology consultation. Check TSH and continue tele-monitoring with serial cardiac enzymes.

## 2018-12-26 NOTE — PLAN OF CARE
Problem: Adult Inpatient Plan of Care  Goal: Plan of Care Review  Outcome: Ongoing (interventions implemented as appropriate)  Patient continues with remote telemetry monitoring. Family updated at bedside regarding plan of care and family verbalized understanding regarding plan of care. Pt denies pain. Immobilizer to left hip remains in place. Call light in easy reach. Continue to monitor pt closely.

## 2018-12-26 NOTE — PLAN OF CARE
Spoke with the pt's son, Bryan regarding the discharge plan; he thought/wants the pt to go to [facility] for SNF.  Sent the referral to [facility] via [facility].....SUMIT Ly       12/26/18 1015   Post-Acute Status   Post-Acute Authorization Placement   Post-Acute Placement Status Referrals Sent

## 2018-12-26 NOTE — PROGRESS NOTES
"[facility]  Orthopedics  Progress Note    Patient Name: Adrian Osorio  MRN: [id number]  Admission Date: 12/21/2018  Hospital Length of Stay: 5 days  Attending Provider: Pascale Nash MD  Primary Care Provider: Martín Perales MD  Follow-up For: Procedure(s) (LRB):  REVISION, TOTAL ARTHROPLASTY, HIP (Left)    Post-Operative Day: 2 Days Post-Op  Subjective:     Principal Problem:Hip dislocation, left, initial encounter    Principal Orthopedic Problem: Status post left total hip arthroplasty    Interval History: Poor mobility and endurance. Left hip brace is "broken"    Review of patient's allergies indicates:  No Known Allergies    Current Facility-Administered Medications   Medication    bisacodyl suppository 10 mg    enoxaparin injection 40 mg    famotidine tablet 20 mg    HYDROcodone-acetaminophen 5-325 mg per tablet 1 tablet    megestrol 400 mg/10 mL (10 mL) suspension 200 mg    mupirocin 2 % ointment 1 g    sodium chloride 0.9% flush 5 mL    tuberculin injection 5 Units    zolpidem tablet 5 mg     Objective:     Vital Signs (Most Recent):  Temp: 99.1 °F (37.3 °C) (12/26/18 0813)  Pulse: 73 (12/26/18 0813)  Resp: 16 (12/26/18 0813)  BP: (!) 153/71 (12/26/18 0813)  SpO2: 98 % (12/26/18 0813) Vital Signs (24h Range):  Temp:  [96.8 °F (36 °C)-99.1 °F (37.3 °C)] 99.1 °F (37.3 °C)  Pulse:  [73-81] 73  Resp:  [16-18] 16  SpO2:  [95 %-100 %] 98 %  BP: (125-153)/(58-71) 153/71     Weight: 94.3 kg (208 lb)  Height: 6' (182.9 cm)  Body mass index is 28.21 kg/m².      Intake/Output Summary (Last 24 hours) at 12/26/2018 1056  Last data filed at 12/26/2018 0400  Gross per 24 hour   Intake 300 ml   Output 540 ml   Net -240 ml       General    Vitals reviewed.  Constitutional: He is oriented to person, place, and time. He appears well-developed and well-nourished.   Pulmonary/Chest: Effort normal.   Abdominal: Soft.   Neurological: He is alert and oriented to person, place, and time. "   Psychiatric: He has a normal mood and affect. His behavior is normal.         Left Hip Exam     Comments:  Postop dressing clean and dry. Left lower extremity is distal neurovascular intact.            Significant Labs:   CBC:   Recent Labs   Lab 12/24/18  1559 12/25/18  0917 12/26/18  0549   WBC 9.30 7.40 7.30   HGB 10.0* 9.0* 8.7*   HCT 30.6* 27.3* 26.3*    326 330     CMP:   Recent Labs   Lab 12/25/18  0952 12/26/18  0549   * 137   K 3.9 3.6    105   CO2 21* 27   * 94   BUN 18 15   CREATININE 1.0 0.8   CALCIUM 7.9* 7.8*   PROT 5.5* 5.3*   ALBUMIN 2.0* 1.9*   BILITOT 0.4 0.3   ALKPHOS 74 71   AST 18 21   ALT 13 10   ANIONGAP 10 5*   EGFRNONAA >60 >60     Wound Culture:   Recent Labs   Lab 12/24/18  1235   LABAERO No growth     All pertinent labs within the past 24 hours have been reviewed.    Significant Imaging: None    Assessment/Plan:     * Hip dislocation, left, initial encounter    Stable from a postoperative perspective. Consider referral to skilled nursing. Dr. baca would like for this patient to have a left hip range of motion limiting brace for the foreseeable future.           MERCEDES GUEVARA  Orthopedics  [facility]

## 2018-12-26 NOTE — PT/OT/SLP PROGRESS
Physical Therapy      Patient Name:  Adrian Osorio   MRN:  [id number]    Patient not seen today secondary to Other (Comment). Advised per evaluating PT to not mobilize pt until hip brace is fixed for pt safety and to prevent injury. Will follow-up .    Indira Dougherty, PTA

## 2018-12-26 NOTE — ASSESSMENT & PLAN NOTE
Stable from a postoperative perspective. Consider referral to skilled nursing. Dr. baca would like for this patient to have a left hip range of motion limiting brace for the foreseeable future.

## 2018-12-26 NOTE — PLAN OF CARE
Problem: Adult Inpatient Plan of Care  Goal: Plan of Care Review  Pleasantly confused. Cooperative. IV site wnl. Daughter staying night with pt. Brace with broken strap so pt. Told he could not get up until it is fixed. Used urinal successfully.Light yellow clear urine NV cks wnl. Heels elevated off bed. Medicated for sleep and pain per pts. Request Bed alarm on as precaution..

## 2018-12-26 NOTE — NURSING
"At 2:53pm received call from monitor tech stating pt pulse rate was elevated in the 170's. Immediately accessed pt and pt very upset stating, "I'm stuck here with the children and I don't know what to do". Pt face very flush and heart was bounding with rate of 160. o2 sats on room air 92%. Place pt on oxygen at 2L/nc for cardiac ease. Notified Dr. Nash and obtained an order for an EKG. Notified cardiology regarding need for stat EKG which was done promptly. Pt did calm down quickly upon soothing relaxing words. Blood pressure fluctuations noted initially and now settled down. Remained with patient for 45 minutes until pt stabilized.   "

## 2018-12-26 NOTE — SUBJECTIVE & OBJECTIVE
"Principal Problem:Hip dislocation, left, initial encounter    Principal Orthopedic Problem: Status post left total hip arthroplasty    Interval History: Poor mobility and endurance. Left hip brace is "broken"    Review of patient's allergies indicates:  No Known Allergies    Current Facility-Administered Medications   Medication    bisacodyl suppository 10 mg    enoxaparin injection 40 mg    famotidine tablet 20 mg    HYDROcodone-acetaminophen 5-325 mg per tablet 1 tablet    megestrol 400 mg/10 mL (10 mL) suspension 200 mg    mupirocin 2 % ointment 1 g    sodium chloride 0.9% flush 5 mL    tuberculin injection 5 Units    zolpidem tablet 5 mg     Objective:     Vital Signs (Most Recent):  Temp: 99.1 °F (37.3 °C) (12/26/18 0813)  Pulse: 73 (12/26/18 0813)  Resp: 16 (12/26/18 0813)  BP: (!) 153/71 (12/26/18 0813)  SpO2: 98 % (12/26/18 0813) Vital Signs (24h Range):  Temp:  [96.8 °F (36 °C)-99.1 °F (37.3 °C)] 99.1 °F (37.3 °C)  Pulse:  [73-81] 73  Resp:  [16-18] 16  SpO2:  [95 %-100 %] 98 %  BP: (125-153)/(58-71) 153/71     Weight: 94.3 kg (208 lb)  Height: 6' (182.9 cm)  Body mass index is 28.21 kg/m².      Intake/Output Summary (Last 24 hours) at 12/26/2018 1056  Last data filed at 12/26/2018 0400  Gross per 24 hour   Intake 300 ml   Output 540 ml   Net -240 ml       General    Vitals reviewed.  Constitutional: He is oriented to person, place, and time. He appears well-developed and well-nourished.   Pulmonary/Chest: Effort normal.   Abdominal: Soft.   Neurological: He is alert and oriented to person, place, and time.   Psychiatric: He has a normal mood and affect. His behavior is normal.         Left Hip Exam     Comments:  Postop dressing clean and dry. Left lower extremity is distal neurovascular intact.            Significant Labs:   CBC:   Recent Labs   Lab 12/24/18  1559 12/25/18  0917 12/26/18  0549   WBC 9.30 7.40 7.30   HGB 10.0* 9.0* 8.7*   HCT 30.6* 27.3* 26.3*    326 330     CMP:   Recent " Labs   Lab 12/25/18  0952 12/26/18  0549   * 137   K 3.9 3.6    105   CO2 21* 27   * 94   BUN 18 15   CREATININE 1.0 0.8   CALCIUM 7.9* 7.8*   PROT 5.5* 5.3*   ALBUMIN 2.0* 1.9*   BILITOT 0.4 0.3   ALKPHOS 74 71   AST 18 21   ALT 13 10   ANIONGAP 10 5*   EGFRNONAA >60 >60     Wound Culture:   Recent Labs   Lab 12/24/18  1235   LABAERO No growth     All pertinent labs within the past 24 hours have been reviewed.    Significant Imaging: None

## 2018-12-26 NOTE — NURSING
Spoke with Madeleine at [facility] ([phone])  regarding pt hip immobilizer needing repair. Madeleine states she will speak with Ranjeet and call this nurse back to plan repair needs.

## 2018-12-26 NOTE — NURSING
Spoke with Rodney YU who informed this nurse that he spoke with Dr. Neves and was instructed that pt will need his left hip immobilizer repaired as pt will need this device to be able to get out of bed. Left hip immobilizer to be in place at all times.

## 2018-12-26 NOTE — NURSING
Ranjeet with [facility] [employer] repaired pt hip immobilizer. Left hip immobilizer placed back on pt after repairs completed. Pt tolerated well.

## 2018-12-26 NOTE — NURSING
Madeleine with [facility] returned call and informed this nurse that Ranjeet would be by after lunch today to evaluate and repair immobilizer.

## 2018-12-27 PROBLEM — E44.0 MODERATE MALNUTRITION: Status: ACTIVE | Noted: 2018-12-27

## 2018-12-27 LAB
ALBUMIN SERPL BCP-MCNC: 1.9 G/DL
ALP SERPL-CCNC: 67 U/L
ALT SERPL W/O P-5'-P-CCNC: 12 U/L
ANION GAP SERPL CALC-SCNC: 8 MMOL/L
AORTIC ROOT ANNULUS: 3.71 CM
AORTIC VALVE CUSP SEPERATION: 2.02 CM
AST SERPL-CCNC: 21 U/L
AV INDEX (PROSTH): 0.76
AV MEAN GRADIENT: 5.8 MMHG
AV PEAK GRADIENT: 9.61 MMHG
AV VALVE AREA: 2.34 CM2
BACTERIA SPEC AEROBE CULT: NO GROWTH
BASOPHILS # BLD AUTO: 0 K/UL
BASOPHILS NFR BLD: 0.4 %
BILIRUB SERPL-MCNC: 0.3 MG/DL
BSA FOR ECHO PROCEDURE: 2.19 M2
BUN SERPL-MCNC: 13 MG/DL
CALCIUM SERPL-MCNC: 7.8 MG/DL
CHLORIDE SERPL-SCNC: 107 MMOL/L
CHOLEST SERPL-MCNC: 130 MG/DL
CHOLEST/HDLC SERPL: 4.5 {RATIO}
CK MB SERPL-MCNC: 1.2 NG/ML
CK MB SERPL-RTO: 2.1 %
CK SERPL-CCNC: 58 U/L
CO2 SERPL-SCNC: 25 MMOL/L
CREAT SERPL-MCNC: 0.8 MG/DL
CV ECHO LV RWT: 0.36 CM
DIFFERENTIAL METHOD: ABNORMAL
DOP CALC AO PEAK VEL: 1.55 M/S
DOP CALC AO VTI: 29.26 CM
DOP CALC LVOT AREA: 3.08 CM2
DOP CALC LVOT DIAMETER: 1.98 CM
DOP CALC LVOT STROKE VOLUME: 68.57 CM3
DOP CALCLVOT PEAK VEL VTI: 22.28 CM
E WAVE DECELERATION TIME: 204.92 MSEC
E/A RATIO: 0.71
E/E' RATIO: 10
ECHO LV POSTERIOR WALL: 1.02 CM (ref 0.6–1.1)
EOSINOPHIL # BLD AUTO: 0.2 K/UL
EOSINOPHIL NFR BLD: 2.8 %
ERYTHROCYTE [DISTWIDTH] IN BLOOD BY AUTOMATED COUNT: 15.1 %
EST. GFR  (AFRICAN AMERICAN): >60 ML/MIN/1.73 M^2
EST. GFR  (NON AFRICAN AMERICAN): >60 ML/MIN/1.73 M^2
FRACTIONAL SHORTENING: 8 % (ref 28–44)
GLUCOSE SERPL-MCNC: 87 MG/DL
HCT VFR BLD AUTO: 28 %
HDLC SERPL-MCNC: 29 MG/DL
HDLC SERPL: 22.3 %
HGB BLD-MCNC: 9.1 G/DL
INTERVENTRICULAR SEPTUM: 0.95 CM (ref 0.6–1.1)
IVRT: 0.11 MSEC
LDLC SERPL CALC-MCNC: 82.4 MG/DL
LEFT ATRIUM SIZE: 5.48 CM
LEFT INTERNAL DIMENSION IN SYSTOLE: 5.22 CM (ref 2.1–4)
LEFT VENTRICLE MASS INDEX: 102.1 G/M2
LEFT VENTRICULAR INTERNAL DIMENSION IN DIASTOLE: 5.69 CM (ref 3.5–6)
LEFT VENTRICULAR MASS: 221.28 G
LV LATERAL E/E' RATIO: 7.78
LV SEPTAL E/E' RATIO: 14
LYMPHOCYTES # BLD AUTO: 1.3 K/UL
LYMPHOCYTES NFR BLD: 21.8 %
MAGNESIUM SERPL-MCNC: 1.7 MG/DL
MCH RBC QN AUTO: 29.6 PG
MCHC RBC AUTO-ENTMCNC: 32.6 G/DL
MCV RBC AUTO: 91 FL
MONOCYTES # BLD AUTO: 0.7 K/UL
MONOCYTES NFR BLD: 11.1 %
MV PEAK A VEL: 0.99 M/S
MV PEAK E VEL: 0.7 M/S
NEUTROPHILS # BLD AUTO: 3.9 K/UL
NEUTROPHILS NFR BLD: 63.9 %
NONHDLC SERPL-MCNC: 101 MG/DL
PISA TR MAX VEL: 2.3 M/S
PLATELET # BLD AUTO: 305 K/UL
PMV BLD AUTO: 7 FL
POTASSIUM SERPL-SCNC: 3.3 MMOL/L
PROT SERPL-MCNC: 5.1 G/DL
PULM VEIN A" WAVE DURATION": 138 MSEC
PV PEAK VELOCITY: 0.58 CM/S
RBC # BLD AUTO: 3.09 M/UL
RIGHT VENTRICULAR END-DIASTOLIC DIMENSION: 4.4 CM
SODIUM SERPL-SCNC: 140 MMOL/L
TDI LATERAL: 0.09
TDI SEPTAL: 0.05
TDI: 0.07
TR MAX PG: 21.16 MMHG
TRICUSPID ANNULAR PLANE SYSTOLIC EXCURSION: 3.25 CM
TRIGL SERPL-MCNC: 93 MG/DL
WBC # BLD AUTO: 6.2 K/UL

## 2018-12-27 PROCEDURE — G8987 SELF CARE CURRENT STATUS: HCPCS | Mod: CL

## 2018-12-27 PROCEDURE — 82550 ASSAY OF CK (CPK): CPT

## 2018-12-27 PROCEDURE — 25000003 PHARM REV CODE 250: Performed by: SPECIALIST

## 2018-12-27 PROCEDURE — 85025 COMPLETE CBC W/AUTO DIFF WBC: CPT

## 2018-12-27 PROCEDURE — 97535 SELF CARE MNGMENT TRAINING: CPT

## 2018-12-27 PROCEDURE — 97116 GAIT TRAINING THERAPY: CPT

## 2018-12-27 PROCEDURE — 97802 MEDICAL NUTRITION INDIV IN: CPT

## 2018-12-27 PROCEDURE — 97530 THERAPEUTIC ACTIVITIES: CPT

## 2018-12-27 PROCEDURE — 80053 COMPREHEN METABOLIC PANEL: CPT

## 2018-12-27 PROCEDURE — 25000003 PHARM REV CODE 250: Performed by: ORTHOPAEDIC SURGERY

## 2018-12-27 PROCEDURE — 27000221 HC OXYGEN, UP TO 24 HOURS

## 2018-12-27 PROCEDURE — 99232 SBSQ HOSP IP/OBS MODERATE 35: CPT | Mod: ,,, | Performed by: INTERNAL MEDICINE

## 2018-12-27 PROCEDURE — 99232 PR SUBSEQUENT HOSPITAL CARE,LEVL II: ICD-10-PCS | Mod: ,,, | Performed by: INTERNAL MEDICINE

## 2018-12-27 PROCEDURE — 36415 COLL VENOUS BLD VENIPUNCTURE: CPT

## 2018-12-27 PROCEDURE — 80061 LIPID PANEL: CPT

## 2018-12-27 PROCEDURE — 12000002 HC ACUTE/MED SURGE SEMI-PRIVATE ROOM

## 2018-12-27 PROCEDURE — 97167 OT EVAL HIGH COMPLEX 60 MIN: CPT

## 2018-12-27 PROCEDURE — 63600175 PHARM REV CODE 636 W HCPCS: Performed by: INTERNAL MEDICINE

## 2018-12-27 PROCEDURE — S0179 MEGESTROL 20 MG: HCPCS | Performed by: INTERNAL MEDICINE

## 2018-12-27 PROCEDURE — 94761 N-INVAS EAR/PLS OXIMETRY MLT: CPT

## 2018-12-27 PROCEDURE — 82553 CREATINE MB FRACTION: CPT

## 2018-12-27 PROCEDURE — 25000003 PHARM REV CODE 250: Performed by: INTERNAL MEDICINE

## 2018-12-27 PROCEDURE — 83735 ASSAY OF MAGNESIUM: CPT

## 2018-12-27 PROCEDURE — G8988 SELF CARE GOAL STATUS: HCPCS | Mod: CK

## 2018-12-27 RX ORDER — POTASSIUM CHLORIDE 20 MEQ/1
40 TABLET, EXTENDED RELEASE ORAL ONCE
Status: COMPLETED | OUTPATIENT
Start: 2018-12-27 | End: 2018-12-27

## 2018-12-27 RX ORDER — LISINOPRIL 10 MG/1
10 TABLET ORAL DAILY
Status: DISCONTINUED | OUTPATIENT
Start: 2018-12-28 | End: 2018-12-27

## 2018-12-27 RX ORDER — VALSARTAN 80 MG/1
160 TABLET ORAL 2 TIMES DAILY
Status: DISCONTINUED | OUTPATIENT
Start: 2018-12-27 | End: 2018-12-29

## 2018-12-27 RX ADMIN — CARVEDILOL 3.12 MG: 3.12 TABLET, FILM COATED ORAL at 05:12

## 2018-12-27 RX ADMIN — VALSARTAN 160 MG: 80 TABLET ORAL at 09:12

## 2018-12-27 RX ADMIN — FAMOTIDINE 20 MG: 20 TABLET ORAL at 09:12

## 2018-12-27 RX ADMIN — HYDROCODONE BITARTRATE AND ACETAMINOPHEN 1 TABLET: 5; 325 TABLET ORAL at 11:12

## 2018-12-27 RX ADMIN — ENOXAPARIN SODIUM 90 MG: 100 INJECTION SUBCUTANEOUS at 10:12

## 2018-12-27 RX ADMIN — ENOXAPARIN SODIUM 90 MG: 100 INJECTION SUBCUTANEOUS at 09:12

## 2018-12-27 RX ADMIN — MUPIROCIN 1 G: 20 OINTMENT TOPICAL at 10:12

## 2018-12-27 RX ADMIN — MUPIROCIN 1 G: 20 OINTMENT TOPICAL at 09:12

## 2018-12-27 RX ADMIN — ASPIRIN 81 MG CHEWABLE TABLET 81 MG: 81 TABLET CHEWABLE at 09:12

## 2018-12-27 RX ADMIN — POTASSIUM CHLORIDE 40 MEQ: 20 TABLET, EXTENDED RELEASE ORAL at 11:12

## 2018-12-27 RX ADMIN — MEGESTROL ACETATE 200 MG: 40 SUSPENSION ORAL at 09:12

## 2018-12-27 RX ADMIN — CARVEDILOL 3.12 MG: 3.12 TABLET, FILM COATED ORAL at 09:12

## 2018-12-27 NOTE — PLAN OF CARE
12/26/18 1803   PRE-TX-O2-ETCO2   O2 Device (Oxygen Therapy) nasal cannula   $ Is the patient on Low Flow Oxygen? Yes   Flow (L/min) 2   Oxygen Concentration (%) 28   SpO2 (!) 94 %   Pulse Oximetry Type Intermittent   $ Pulse Oximetry - Multiple Charge Pulse Oximetry - Multiple   Ready to Wean/Extubation Screen   FIO2<=50 (chart decimal) 0.28

## 2018-12-27 NOTE — PROGRESS NOTES
Progress Note  Hospital Medicine  Patient Name:Adrian Osorio  MRN:  [id number]  Patient Class: IP- Inpatient  Admit Date: 12/21/2018  Length of Stay: 6 days  Expected Discharge Date:   Attending Physician: Pascale Nash MD  Primary Care Provider:  Martín Perales MD    SUBJECTIVE:     Principal Problem: Hip dislocation, left, initial encounter  Initial history of present illness: Mr. 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 He presents to the ED with c/o left hip pain. Patient had a left hip arthroplasty secondary to a femoral neck fracture in October 2018. He was evaluated on 12/17/2018 and yesterday for left hip dislocation. The dislocation was reduced in the ED and he was placed in a abduction brace. The plan was to have a repair for instability done after Zeny.  He returns today with again L hip dislocation.  He is admitted to the service of [facility] medicine and will be evaluated by the orthopedic surgeon.  Pt is a poor historian; information is obtained from ED and past medical records.    PMH/PSH/SH/FH/Meds: reviewed.    Symptoms/Review of Systems: s/p left hip arthroplasty revision. Patient is very [place]. Patient yesterday developed AF with RVR. Now in NSR.  No shortness of breath, cough, chest pain or headache, fever or abdominal pain.     Diet:  cardiac  Activity level: Up with assistance  Pain:  Patient reports no pain.       OBJECTIVE:   Vital Signs (Most Recent):      Temp: 97.7 °F (36.5 °C) (12/27/18 0801)  Pulse: 74 (12/27/18 0801)  Resp: 16 (12/27/18 0801)  BP: (!) 149/70 (12/27/18 0801)  SpO2: 99 % (12/27/18 1006)       Vital Signs Range (Last 24H):  Temp:  [96.2 °F (35.7 °C)-99.1 °F (37.3 °C)]   Pulse:  []   Resp:  [16-28]   BP: (111-158)/(57-95)   SpO2:  [94 %-99 %]     Weight: 94.3 kg (208 lb)  Body mass index is 28.21 kg/m².    Intake/Output Summary (Last 24 hours) at 12/27/2018 1101  Last data filed at 12/27/2018  0615  Gross per 24 hour   Intake 1490 ml   Output 1500 ml   Net -10 ml     Physical Examination:  Constitutional: He appears well-developed and well-nourished.   HENT:   Head: Normocephalic and atraumatic.   Eyes: Conjunctivae and EOM are normal. Pupils are equal, round, and reactive to light.   Neck: Normal range of motion. Neck supple. No JVD present.   Cardiovascular: Normal rate, regular rhythm, normal heart sounds and intact distal pulses.   Pulmonary/Chest: Effort normal and breath sounds normal. No stridor. No respiratory distress.   Abdominal: Soft. Bowel sounds are normal. He exhibits no distension. There is no tenderness.   Skin: Skin is warm and dry. Capillary refill takes 2 to 3 seconds.   Psychiatric: He has a normal mood and affect. His behavior is normal. Judgment normal.   Ext: Left hip dressing C/D/I. Hip brace in place.  Vitals reviewed.  CRANIAL NERVES   CN III, IV, VI   Pupils are equal, round, and reactive to light.  Extraocular motions are normal.      CBC:  Recent Labs   Lab 12/25/18  0917 12/26/18  0549 12/27/18  0617   WBC 7.40 7.30 6.20   RBC 3.07* 2.92* 3.09*   HGB 9.0* 8.7* 9.1*   HCT 27.3* 26.3* 28.0*    330 305   MCV 89 90 91   MCH 29.3 29.8 29.6   MCHC 32.9 33.1 32.6   BMP  Recent Labs   Lab 12/25/18  0952 12/26/18  0549 12/27/18  0617   * 94 87   * 137 140   K 3.9 3.6 3.3*    105 107   CO2 21* 27 25   BUN 18 15 13   CREATININE 1.0 0.8 0.8   CALCIUM 7.9* 7.8* 7.8*   MG  --   --  1.7      Diagnostic Results:  Microbiology Results (last 7 days)     Procedure Component Value Units Date/Time    Culture, Anaerobic [[id number]] Collected:  12/24/18 1235    Order Status:  Completed Specimen:  Body Fluid from Hip, Left Updated:  12/27/18 0949     Anaerobic Culture Culture in progress    Aerobic culture [[id number]] Collected:  12/24/18 1235    Order Status:  Completed Specimen:  Body Fluid from Hip, Left Updated:  12/27/18 0933     Aerobic Bacterial Culture No growth          Left hip x-ray: Left hip dislocation    Left hip x-ray: Interval reduction of the superolateral dislocation.    Assessment/Plan:     * s/p left total hip revision arthroplasty  Hip dislocation, left, initial encounter     Continue to follow Orthopedic recommendations.  Needs aggressive incentive spirometry.  Follow hemoglobin and hematocrit closely.  Pain control with PO narcotics and antiemetics as needed.  Physical therapy as per Orthopedics protocol with fall precautions.     Essential hypertension     Chronic/Controlled. Continue chronic medications, adjusting as needed.      Chronic systolic heart failure/ ICMO EF 40%     Stable.  Continue BB, diuretic, and ARB  Last Echo 10/24/18: The estimated LV ejection fraction is 40 %. LV systolic function is mildly to moderately reduced. Diastolic function is indeterminate.      Alzheimer's dementia without behavioral disturbance     Stable. Continue Namenda, Aricept, and Seroquel.  Delirium precautions.  Fall precautions     Moderate malnutrition  Nutrition consulted. Encourage maximal PO intake. Diet supplementation ordered per nutrition approval. Will encourage PO and monitor closely for weight changes.    Acute surgical blood loss anemia  Follow CBC and transfuse as needed.    Hypokalemia  Replete KCl. Follow BMP.    SNF placement pending.    VTE Risk Mitigation (From admission, onward)        Ordered     enoxaparin injection 90 mg  Every 12 hours      12/26/18 4668        aPscale Nash MD  Department of Hospital Medicine   [facility]

## 2018-12-27 NOTE — ASSESSMENT & PLAN NOTE
Contributing Nutrition Diagnosis  Moderate Chronic illness Related Malnutrition    Related to (etiology):   Decreased appetite and decreased ability to perform nutrition related ADLs/dementia    Signs and Symptoms (as evidenced by):   1) PO intakes < 75% of estimated needs x > 1 month 2) Mild muscle/fat loss @ temples, orbital area, clavicle, scapula, tricep, biceps, calves, and interosseous    Interventions/Recommendations (treatment strategy):  1) Boost Plus BID 2) Liberalized diet, texture modification only until appetite improves    Nutrition Diagnosis Status:  New

## 2018-12-27 NOTE — PLAN OF CARE
Problem: Adult Inpatient Plan of Care  Goal: Plan of Care Review  Outcome: Ongoing (interventions implemented as appropriate)  Plan of care reviewed with patient. Afib on telemetry most of shift,convrted to SR during night. L. Hip incision with dressing with moist drainage noted. Immobilizer in use. Remains free from falls/injury. Instructed to call for assistance as needed during night. Bed alarm set. Call light in reach. Daughter at bedside.

## 2018-12-27 NOTE — PLAN OF CARE
Problem: Occupational Therapy Goal  Goal: Occupational Therapy Goal  Goals to be met by: 1-4-19     Patient will increase functional independence with ADLs by performing:    UE Dressing with Stand-by Assistance.  LE Dressing with Maximum Assistance.  Grooming while seated with Stand-by Assistance.  Toileting from toilet with Moderate Assistance for hygiene and clothing management.   Stand pivot transfers with Contact Guard Assistance.  Toilet transfer to toilet with Contact Guard Assistance.    Outcome: Ongoing (interventions implemented as appropriate)  OT Evaluation completed and poc established with daughter's input.

## 2018-12-27 NOTE — PLAN OF CARE
"Problem: Adult Inpatient Plan of Care  Goal: Plan of Care Review  Outcome: Ongoing (interventions implemented as appropriate)  Plan of care review with pt, pt states "understanding," no redness/swelling noted to IV site, +2 edema palpated to left hip with immobilizer in place, + nimo palpable pedal pulses, bruise noted to right eye with lower eyelid swelling s/p fall at home, pt is [place], sinus rhythm with occasional PVC, PAC's on telemetry, will continue to monitor, observe and note any changes, safety maintain        "

## 2018-12-27 NOTE — PT/OT/SLP PROGRESS
"Physical Therapy Treatment    Patient Name:  Adrian Osorio   MRN:  [id number]    Recommendations:     Discharge Recommendations:      Discharge Equipment Recommendations:     Barriers to discharge: None    Assessment:     Adrian Osorio is a 85 y.o. male admitted with a medical diagnosis of Hip dislocation, left, initial encounter.  He presents with the following impairments/functional limitations:  weakness, impaired endurance, impaired self care skills, impaired functional mobilty, gait instability, decreased lower extremity function, pain, orthopedic precautions, impaired cardiopulmonary response to activity .    Rehab Prognosis: Good; patient would benefit from acute skilled PT services to address these deficits and reach maximum level of function.    Recent Surgery: Procedure(s) (LRB):  REVISION, TOTAL ARTHROPLASTY, HIP (Left) 3 Days Post-Op    Plan:     During this hospitalization, patient to be seen BID to address the identified rehab impairments via gait training, therapeutic activities, therapeutic exercises and progress toward the following goals:    · Plan of Care Expires:       Subjective     Chief Complaint:  Pain in L hip with movement. Agreeable to mobilize.  Patient/Family Comments/goals: to return home  Pain/Comfort:  · Pain Rating 1: other (see comments)(did not rate, stated " This hip hurts")  · Location - Side 1: Left  · Location - Orientation 1: generalized  · Location 1: hip  · Pain Addressed 1: Reposition, Nurse notified(nurse bringing oain meds upon request)      Objective:     Communicated with nurse Castaneda prior to session.  Patient found supine in bed with hip abduction brace, oxygen, SCD  upon PT entry to room.     General Precautions: Standard, fall   Orthopedic Precautions:LLE weight bearing as tolerated, LLE posterior precautions   Braces:       Functional Mobility:  · Bed Mobility:     · Rolling Left:  moderate assistance  · Rolling Right: moderate assistance  · Supine to " Sit: maximal assistance  · Sit to Supine: maximal assistance  · Transfers:     · Sit to Stand:  moderate assistance and of 2 persons with rolling walker  · Gait: 5 steps fwd/ bkwd with rw and Mod A ( 2 people for safety) with brace in place.      AM-PAC 6 CLICK MOBILITY          Therapeutic Activities and Exercises:   Transferred to sitting EOB. Extra time to adjust to change in position and increased pain with movement. Stood with rw and A x 2. Took few steps with c/o increased pain with activity. Performed 2 trials with seated rest between each. Patient very cooperative and willing to participate. Nurse bringing pain meds following per patient request.    Patient left supine with all lines intact, call button in reach, bed alarm on, nurse Tonya notified and daughter present..    GOALS:   Multidisciplinary Problems     Physical Therapy Goals        Problem: Physical Therapy Goal    Goal Priority Disciplines Outcome Goal Variances Interventions   Physical Therapy Goal     PT, PT/OT Ongoing (interventions implemented as appropriate)     Description:  Goals to be met by: 2019     Patient will increase functional independence with mobility by performin). Supine to sit with Stand-by Assistance  2). Sit to supine with Stand-by Assistance  3). Sit to stand transfer with Contact Guard Assistance  4). Bed to chair transfer with Contact Guard Assistance using Rolling Walker  5). Gait  x  > 50 feet with Contact Guard Assistance using Rolling Walker.                       Time Tracking:     PT Received On: 18  PT Start Time: 1100     PT Stop Time: 1115  PT Total Time (min): 15 min     Billable Minutes: Therapeutic Activity 15min    Treatment Type: Treatment  PT/PTA: PTA     PTA Visit Number: 1     Tammi Gonzalez PTA  2018

## 2018-12-27 NOTE — PT/OT/SLP PROGRESS
Physical Therapy Treatment    Patient Name:  Adrian Osorio   MRN:  [id number]    Recommendations:     Discharge Recommendations:      Discharge Equipment Recommendations:     Barriers to discharge: None    Assessment:     Adrian Osorio is a 85 y.o. male admitted with a medical diagnosis of Hip dislocation, left, initial encounter.  He presents with the following impairments/functional limitations:  weakness, impaired endurance, impaired self care skills, impaired functional mobilty, gait instability, decreased lower extremity function, pain, impaired cardiopulmonary response to activity, orthopedic precautions, impaired balance .    Rehab Prognosis: Good; patient would benefit from acute skilled PT services to address these deficits and reach maximum level of function.    Recent Surgery: Procedure(s) (LRB):  REVISION, TOTAL ARTHROPLASTY, HIP (Left) 3 Days Post-Op    Plan:     During this hospitalization, patient to be seen BID to address the identified rehab impairments via gait training, therapeutic activities, therapeutic exercises and progress toward the following goals:    · Plan of Care Expires:       Subjective     Chief Complaint: pain with movement L hip  Patient/Family Comments/goals: none stated  Pain/Comfort:  · Pain Rating 1: other (see comments)(did not rate)  · Location - Side 1: Left  · Location - Orientation 1: generalized  · Location 1: hip  · Pain Addressed 1: Reposition, Nurse notified(requqested to receive pain meds after therapy.)      Objective:     Communicated with nurse Castaneda prior to session.  Patient found supine with  oxygen, telemetry, hip abduction brace  upon PT entry to room.     General Precautions: Standard, fall, hearing impaired   Orthopedic Precautions:LLE weight bearing as tolerated, LLE posterior precautions   Braces: Hip abduction brace     Functional Mobility:  · Bed Mobility:     · Rolling Left:  moderate assistance  · Rolling Right: moderate  assistance  · Supine to Sit: maximal assistance  · Sit to Supine: maximal assistance  · Transfers:     · Sit to Stand:  maximal assistance with rolling walker  · Gait: 10' x 2 with rw and Max A , with Hip abd brace and O2 attached.      AM-PAC 6 CLICK MOBILITY          Therapeutic Activities and Exercises:   Returned supine with Max A and verbal cues for technique. Patient with fear of falling.    Patient left supine with all lines intact, call button in reach, nurse Tonya notified and family present..    GOALS:   Multidisciplinary Problems     Physical Therapy Goals        Problem: Physical Therapy Goal    Goal Priority Disciplines Outcome Goal Variances Interventions   Physical Therapy Goal     PT, PT/OT Ongoing (interventions implemented as appropriate)     Description:  Goals to be met by: 2019     Patient will increase functional independence with mobility by performin). Supine to sit with Stand-by Assistance  2). Sit to supine with Stand-by Assistance  3). Sit to stand transfer with Contact Guard Assistance  4). Bed to chair transfer with Contact Guard Assistance using Rolling Walker  5). Gait  x  > 50 feet with Contact Guard Assistance using Rolling Walker.                       Time Tracking:     PT Received On: 18  PT Start Time: 1410     PT Stop Time: 1420  PT Total Time (min): 10 min     Billable Minutes: Gait Training 10min    Treatment Type: Treatment  PT/PTA: PTA     PTA Visit Number: 1     Tammi Gonzalez PTA  2018

## 2018-12-27 NOTE — PT/OT/SLP EVAL
Occupational Therapy   Evaluation    Name: Adrian Osorio  MRN: [id number]  Admitting Diagnosis:  Hip dislocation, left, initial encounter 3 Days Post-Op    Recommendations:     Discharge Recommendations: nursing facility, skilled  Discharge Equipment Recommendations:  (tbd)  Barriers to discharge:  None    History:     Occupational Profile:  Living Environment: lives with daughter and son in law.  Previous level of function: mod I to min prior to hip fx  Roles and Routines: self care, tv, likes to be with family  Equipment Used at Home:  bedside commode, walker, rolling, grab bar, shower chair(has transfer tub bench but did not know how to use it)  Assistance upon Discharge: daughter and son in law    Past Medical History:   Diagnosis Date    CHF (congestive heart failure)     Coronary artery disease     Hypertension        Past Surgical History:   Procedure Laterality Date    CARDIAC SURGERY      cabg    HEMIARTHROPLASTY, HIP Left 10/29/2018    Performed by [facility] at [facility]    REVISION, TOTAL ARTHROPLASTY, HIP Left 12/24/2018    Performed by Brando Neves MD at [facility] OR       Subjective     Chief Complaint: pain  Patient/Family Comments/goals: SNF    Pain/Comfort:  · Pain Rating 1: 7/10  · Location - Side 1: Left  · Location - Orientation 1: generalized  · Location 1: hip  · Pain Addressed 1: Reposition  · Pain Rating Post-Intervention 1: 7/10    Patients cultural, spiritual, Tenriism conflicts given the current situation: no    Objective:     Communicated with: nurse prior to session.  Patient found with: all lines intact, call button in reach and daughter present and SCD, oxygen(hip abduction brace) upon OT entry to room.    General Precautions: Standard, hearing impaired, fall   Orthopedic Precautions:LLE weight bearing as tolerated, LLE posterior precautions   Braces: Hip abduction brace     Occupational Performance:    Bed Mobility:    · Patient completed Rolling/Turning to Left with   minimum assistance  · Patient completed Scooting/Bridging with minimum assistance  · Patient completed Supine to Sit with moderate assistance  · Patient completed Sit to Supine with maximal assistance    Functional Mobility/Transfers:  · Patient completed Sit <> Stand Transfer with moderate assistance  with  rolling walker   · Patient completed Toilet Transfer Stand Pivot technique with moderate assistance with  rolling walker and grab bars  · Functional Mobility: Ambulated to and from bed<>bathroom with min assist.    Activities of Daily Living:  · Feeding:  minimum assistance   · Upper Body Dressing: moderate assistance   · Lower Body Dressing: total assistance   · Toileting: total assistance     Cognitive/Visual Perceptual:  Cognitive/Psychosocial Skills:     -       Oriented to: Person   -       Follows Commands/attention:Follows one-step commands  -       Communication: patient is severely hearing impaired  -       Memory: Impaired STM  -       Safety awareness/insight to disability: impaired         AMPAC 6 Click ADL:  AMPAC Total Score: 11    Treatment & Education:  1. Educated family on role of OT in acute and skilled setting.  2. Demonstrated to daughter and patient to straighten L LE out before sitting or standing to lessen pain.  3. Safety precautions  4. Bed mobility.  Education:    Patient left HOB elevated with all lines intact, call button in reach and daughter present    Assessment:     Adrian Osorio is a 85 y.o. male with a medical diagnosis of Hip dislocation, left, initial encounter.  He presents with the following performance deficits affecting function: impaired self care skills, gait instability, weakness, impaired functional mobilty, impaired balance, decreased safety awareness, orthopedic precautions, pain, decreased lower extremity function.      Rehab Prognosis: Good; patient would benefit from acute skilled OT services to address these deficits and reach maximum level of function.  "        Clinical Decision Making:     3.  OT High:  "Pt evaluation falls under high complexity for evaluation category due to 5+ performance deficits identified with comprehensive assessments and significant modifications/assistance required. An expanded review of history and occupational profile completed in addition to expanded review of physical, cognitive and psychosocial history. Several treatment options considered in care."     Plan:     Patient to be seen 3 x/week to address the above listed problems via self-care/home management, therapeutic activities, community/work re-entry, therapeutic exercises, neuromuscular re-education, wheelchair management/training, sensory integration  · Plan of Care Expires: 01/04/19  · Plan of Care Reviewed with: patient, daughter    This Plan of care has been discussed with the patient who was involved in its development and understands and is in agreement with the identified goals and treatment plan    GOALS:   Multidisciplinary Problems     Occupational Therapy Goals        Problem: Occupational Therapy Goal    Goal Priority Disciplines Outcome Interventions   Occupational Therapy Goal     OT, PT/OT Ongoing (interventions implemented as appropriate)    Description:  Goals to be met by: 1-4-19     Patient will increase functional independence with ADLs by performing:    UE Dressing with Stand-by Assistance.  LE Dressing with Maximum Assistance.  Grooming while seated with Stand-by Assistance.  Toileting from toilet with Moderate Assistance for hygiene and clothing management.   Stand pivot transfers with Contact Guard Assistance.  Toilet transfer to toilet with Contact Guard Assistance.                      Time Tracking:     OT Date of Treatment: 12/27/18  OT Start Time: 1300  OT Stop Time: 1352  OT Total Time (min): 52 min    Billable Minutes:Evaluation 15  Self Care/Home Management 37    MYA Reyna  12/27/2018    "

## 2018-12-27 NOTE — PROGRESS NOTES
[facility]  Orthopedics  Progress Note    Patient Name: Adrian Osorio  MRN: [id number]  Admission Date: 12/21/2018  Hospital Length of Stay: 6 days  Attending Provider: Pascale Nash MD  Primary Care Provider: Martín Perales MD  Follow-up For: Procedure(s) (LRB):  REVISION, TOTAL ARTHROPLASTY, HIP (Left)    Post-Operative Day: 3 Days Post-Op  Subjective:     Principal Problem:Hip dislocation, left, initial encounter    Principal Orthopedic Problem: S/P L KAYLEE    Interval History: Poor mobility and insurance    Review of patient's allergies indicates:  No Known Allergies    Current Facility-Administered Medications   Medication    aspirin chewable tablet 81 mg    carvedilol tablet 3.125 mg    enoxaparin injection 90 mg    famotidine tablet 20 mg    HYDROcodone-acetaminophen 5-325 mg per tablet 1 tablet    megestrol 400 mg/10 mL (10 mL) suspension 200 mg    mupirocin 2 % ointment 1 g    sodium chloride 0.9% flush 5 mL    zolpidem tablet 5 mg     Objective:     Vital Signs (Most Recent):  Temp: 97.7 °F (36.5 °C) (12/27/18 0801)  Pulse: 74 (12/27/18 0801)  Resp: 16 (12/27/18 0801)  BP: (!) 149/70 (12/27/18 0801)  SpO2: 99 % (12/27/18 1006) Vital Signs (24h Range):  Temp:  [96.2 °F (35.7 °C)-99.1 °F (37.3 °C)] 97.7 °F (36.5 °C)  Pulse:  [] 74  Resp:  [16-28] 16  SpO2:  [94 %-99 %] 99 %  BP: (111-158)/(57-95) 149/70     Weight: 94.3 kg (208 lb)  Height: 6' (182.9 cm)  Body mass index is 28.21 kg/m².      Intake/Output Summary (Last 24 hours) at 12/27/2018 1056  Last data filed at 12/27/2018 0615  Gross per 24 hour   Intake 1490 ml   Output 1500 ml   Net -10 ml       General    Nursing note and vitals reviewed.  Constitutional: He is oriented to person, place, and time. He appears well-developed and well-nourished.   Pulmonary/Chest: Effort normal.   Abdominal: Soft.   Neurological: He is alert and oriented to person, place, and time.   Psychiatric: He has a normal mood and  affect. His behavior is normal.         Left Hip Exam     Comments:  L hip incision clean and dry. LLE DNVI            Significant Labs:   CBC:   Recent Labs   Lab 12/26/18  0549 12/27/18  0617   WBC 7.30 6.20   HGB 8.7* 9.1*   HCT 26.3* 28.0*    305     CMP:   Recent Labs   Lab 12/26/18  0549 12/27/18  0617    140   K 3.6 3.3*    107   CO2 27 25   GLU 94 87   BUN 15 13   CREATININE 0.8 0.8   CALCIUM 7.8* 7.8*   PROT 5.3* 5.1*   ALBUMIN 1.9* 1.9*   BILITOT 0.3 0.3   ALKPHOS 71 67   AST 21 21   ALT 10 12   ANIONGAP 5* 8   EGFRNONAA >60 >60     All pertinent labs within the past 24 hours have been reviewed.    Significant Imaging: None    Assessment/Plan:     * Hip dislocation, left, initial encounter    Stable from a postoperative perspective.  SNF has been consulted           MERCEDES GUEVARA  Orthopedics  [facility]

## 2018-12-27 NOTE — SUBJECTIVE & OBJECTIVE
Principal Problem:Hip dislocation, left, initial encounter    Principal Orthopedic Problem: S/P L KAYLEE    Interval History: Poor mobility and insurance    Review of patient's allergies indicates:  No Known Allergies    Current Facility-Administered Medications   Medication    aspirin chewable tablet 81 mg    carvedilol tablet 3.125 mg    enoxaparin injection 90 mg    famotidine tablet 20 mg    HYDROcodone-acetaminophen 5-325 mg per tablet 1 tablet    megestrol 400 mg/10 mL (10 mL) suspension 200 mg    mupirocin 2 % ointment 1 g    sodium chloride 0.9% flush 5 mL    zolpidem tablet 5 mg     Objective:     Vital Signs (Most Recent):  Temp: 97.7 °F (36.5 °C) (12/27/18 0801)  Pulse: 74 (12/27/18 0801)  Resp: 16 (12/27/18 0801)  BP: (!) 149/70 (12/27/18 0801)  SpO2: 99 % (12/27/18 1006) Vital Signs (24h Range):  Temp:  [96.2 °F (35.7 °C)-99.1 °F (37.3 °C)] 97.7 °F (36.5 °C)  Pulse:  [] 74  Resp:  [16-28] 16  SpO2:  [94 %-99 %] 99 %  BP: (111-158)/(57-95) 149/70     Weight: 94.3 kg (208 lb)  Height: 6' (182.9 cm)  Body mass index is 28.21 kg/m².      Intake/Output Summary (Last 24 hours) at 12/27/2018 1056  Last data filed at 12/27/2018 0615  Gross per 24 hour   Intake 1490 ml   Output 1500 ml   Net -10 ml       General    Nursing note and vitals reviewed.  Constitutional: He is oriented to person, place, and time. He appears well-developed and well-nourished.   Pulmonary/Chest: Effort normal.   Abdominal: Soft.   Neurological: He is alert and oriented to person, place, and time.   Psychiatric: He has a normal mood and affect. His behavior is normal.         Left Hip Exam     Comments:  L hip incision clean and dry. LLE DNVI            Significant Labs:   CBC:   Recent Labs   Lab 12/26/18  0549 12/27/18  0617   WBC 7.30 6.20   HGB 8.7* 9.1*   HCT 26.3* 28.0*    305     CMP:   Recent Labs   Lab 12/26/18  0549 12/27/18  0617    140   K 3.6 3.3*    107   CO2 27 25   GLU 94 87   BUN 15 13    CREATININE 0.8 0.8   CALCIUM 7.8* 7.8*   PROT 5.3* 5.1*   ALBUMIN 1.9* 1.9*   BILITOT 0.3 0.3   ALKPHOS 71 67   AST 21 21   ALT 10 12   ANIONGAP 5* 8   EGFRNONAA >60 >60     All pertinent labs within the past 24 hours have been reviewed.    Significant Imaging: None

## 2018-12-27 NOTE — CONSULTS
[facility]  Adult Nutrition  Consult Note    SUMMARY    Intervention: Nutrition Supplement Therapy- commercial beverage, Nutrition Education, and texture modified diet    Recommendation:   1) Continue Dental soft diet as needed, changes per SLP   2) Add Boost Plus BID   3) If pt's appetite improves consider cardiac restriction     Goals: 1) PO intakes > 50% most meals and supplements  Nutrition Goal Status: new  Communication of RD Recs: (POC, sticky note, second sign)    Reason for Assessment    Reason For Assessment: identified at risk by screening criteria  Diagnosis: (Hip dislocation)  Relevant Medical History: dementia, HTN, CAD, CABG 2008, CHF  Interdisciplinary Rounds: attended    General Information Comments: 86 y/o male admitted with hip dislocation. Noted to have fair-poor appetite and on appetite stimulant, pt and daughter state he has been eating 0-50% (one 100% today) of his meals. Pt was in and out of the hospital for the last few months with decreased appetite, hx. of subjective 5 lb loss, but chart indicates wt fluctuated 95-85 kg x 1.5 years.  NFPE done, Mild muscle wasting seen. Daughter has been providing Atkins shakes at home for pt. Educated them both on nutrition supplement therapy.    Nutrition Discharge Planning: To be determined- Cardiac, dental soft diet + Boost Plus or Ensure 2 x daily or as needed    Nutrition Risk Screen    Nutrition Risk Screen: other (see comments)(Decreased appetite)    Nutrition/Diet History    Patient Reported Diet/Restrictions/Preferences: general  Spiritual, Cultural Beliefs, Restoration Practices, Values that Affect Care: no  Supplemental Drinks or Food Habits: Atkins  Food Allergies: NKFA(or intolerances)    Anthropometrics    Temp: 98.7 °F (37.1 °C)  Height Method: Measured  Height: 6'(office 12/14)  Height (inches): 72 in  Weight Method: Bed Scale  Weight: 94.3 kg (207 lb 14.3 oz)  Weight (lb): 207.9 lb  Ideal Body Weight (IBW), Male: 178  lb  % Ideal Body Weight, Male (lb): 116.8 lb  BMI (Calculated): 28.3  BMI Grade: 25 - 29.9 - overweight  Usual Body Weight (UBW), k kg(17)  Weight Change Amount: (? etiology of wt fluctuations)  % Usual Body Weight: 107.38  % Weight Change From Usual Weight: 7.16 %       Lab/Procedures/Meds    Pertinent Labs Reviewed: reviewed  BMP  Lab Results   Component Value Date     2018    K 3.3 (L) 2018     2018    CO2 25 2018    BUN 13 2018    CREATININE 0.8 2018    CALCIUM 7.8 (L) 2018    ANIONGAP 8 2018    ESTGFRAFRICA >60 2018    EGFRNONAA >60 2018     Lab Results   Component Value Date    ALBUMIN 1.9 (L) 2018     Pertinent Medications Reviewed: reviewed  Pertinent Medications Comments: megestrol, KCl    Physical Findings/Assessment         Estimated/Assessed Needs    Weight Used For Calorie Calculations: 87.1 kg (192 lb 0.3 oz)(18 estimated dry weight)  Energy Calorie Requirements (kcal): [place] Jeor ( x 1.2) = 1915 kcal  Energy Need Method: [place] Jeor  Protein Requirements: 1.1 g protein/kg (age) = 96 g protein  Weight Used For Protein Calculations: 87.1 kg (192 lb 0.3 oz)  Fluid Requirements (mL): 1915 ml  Estimated Fluid Requirement Method: RDA Method  CHO Requirement: N/A      Nutrition Prescription Ordered    Current Diet Order: Dysphagia soft level 6    Evaluation of Received Nutrient/Fluid Intake    Energy Calories Required: not meeting needs  Protein Required: not meeting needs  Fluid Required: meeting needs  Tolerance: tolerating  % Intake of Estimated Energy Needs: 30-60% most meals  % Meal Intake: 0-100% (variable most 25-50%)    Nutrition Risk    Level of Risk/Frequency of Follow-up: moderate 2 x weekly until appetite improves    Assessment and Plan    Moderate malnutrition    Contributing Nutrition Diagnosis  Moderate Chronic illness Related Malnutrition    Related to (etiology):   Decreased appetite and  decreased ability to perform nutrition related ADLs/dementia    Signs and Symptoms (as evidenced by):   1) PO intakes < 75% of estimated needs x > 1 month 2) Mild muscle/fat loss @ temples, orbital area, clavicle, scapula, tricep, biceps, calves, and interosseous    Interventions/Recommendations (treatment strategy):  1) Boost Plus BID 2) Liberalized diet, texture modification only until appetite improves    Nutrition Diagnosis Status:  New            Monitor and Evaluation    Food and Nutrient Intake: energy intake, food and beverage intake  Food and Nutrient Adminstration: diet order  Physical Activity and Function: nutrition-related ADLs and IADLs  Anthropometric Measurements: weight  Biochemical Data, Medical Tests and Procedures: electrolyte and renal panel  Nutrition-Focused Physical Findings: overall appearance     Malnutrition Assessment  Energy Intake: severe energy intake  Skin (Micronutrient): (Yan = 15, + hip incision)  Teeth (Micronutrient): (Missing)   Micronutrient Evaluation: suspected deficiency  Micronutrient Evaluation Comments: Sebas benefit from MVI   Energy Intake (Malnutrition): less than 75% for greater than or equal to 1 month  Subcutaneous Fat (Malnutrition): mild depletion  Muscle Mass (Malnutrition): mild depletion   Orbital Region (Subcutaneous Fat Loss): mild depletion  Upper Arm Region (Subcutaneous Fat Loss): mild depletion   [place] (Muscle Loss): mild depletion  Clavicle Bone Region (Muscle Loss): mild depletion  Clavicle and Acromion Bone Region (Muscle Loss): mild depletion  Scapular Bone Region (Muscle Loss): mild depletion  Dorsal Hand (Muscle Loss): mild depletion  Posterior Calf Region (Muscle Loss): mild depletion   Edema (Fluid Accumulation): 0-->no edema present   Subcutaneous Fat Loss (Final Summary): mild protein-calorie malnutrition  Muscle Loss Evaluation (Final Summary): mild protein-calorie malnutrition         Nutrition Follow-Up    RD Follow-up?: Yes

## 2018-12-27 NOTE — PLAN OF CARE
Problem: Physical Therapy Goal  Goal: Physical Therapy Goal  Goals to be met by: 2019     Patient will increase functional independence with mobility by performin). Supine to sit with Stand-by Assistance  2). Sit to supine with Stand-by Assistance  3). Sit to stand transfer with Contact Guard Assistance  4). Bed to chair transfer with Contact Guard Assistance using Rolling Walker  5). Gait  x  > 50 feet with Contact Guard Assistance using Rolling Walker.      Outcome: Ongoing (interventions implemented as appropriate)  Therapeutic activity : bed mobility ,transfers, gait with rw and A for safety with brace in place.

## 2018-12-27 NOTE — PLAN OF CARE
12/27/18 1006   PRE-TX-O2-ETCO2   O2 Device (Oxygen Therapy) nasal cannula   $ Is the patient on Low Flow Oxygen? Yes   Flow (L/min) 2   Oxygen Concentration (%) 28   SpO2 99 %   Pulse Oximetry Type Intermittent   $ Pulse Oximetry - Multiple Charge Pulse Oximetry - Multiple   Ready to Wean/Extubation Screen   FIO2<=50 (chart decimal) 0.28

## 2018-12-27 NOTE — PLAN OF CARE
Problem: Malnutrition  Goal: Improved Nutritional Intake    Intervention: Promote and Optimize Oral Intake  Intervention: Nutrition Supplement Therapy- commercial beverage, Nutrition Education, and texture modified diet     Recommendation:   1) Continue Dental soft diet as needed, changes per SLP   2) Add Boost Plus BID   3) If pt's appetite improves consider cardiac restriction      Goals: 1) PO intakes > 50% most meals and supplements  Nutrition Goal Status: new  Communication of RD Recs: (POC, sticky note, second sign)

## 2018-12-28 LAB
ALBUMIN SERPL BCP-MCNC: 2 G/DL
ALP SERPL-CCNC: 75 U/L
ALT SERPL W/O P-5'-P-CCNC: 14 U/L
ANION GAP SERPL CALC-SCNC: 8 MMOL/L
AST SERPL-CCNC: 26 U/L
BASOPHILS # BLD AUTO: 0 K/UL
BASOPHILS NFR BLD: 0.4 %
BILIRUB SERPL-MCNC: 0.3 MG/DL
BNP SERPL-MCNC: 867 PG/ML
BUN SERPL-MCNC: 12 MG/DL
CALCIUM SERPL-MCNC: 8 MG/DL
CHLORIDE SERPL-SCNC: 106 MMOL/L
CO2 SERPL-SCNC: 24 MMOL/L
CREAT SERPL-MCNC: 0.8 MG/DL
DIFFERENTIAL METHOD: ABNORMAL
EOSINOPHIL # BLD AUTO: 0.4 K/UL
EOSINOPHIL NFR BLD: 5.6 %
ERYTHROCYTE [DISTWIDTH] IN BLOOD BY AUTOMATED COUNT: 15.6 %
EST. GFR  (AFRICAN AMERICAN): >60 ML/MIN/1.73 M^2
EST. GFR  (NON AFRICAN AMERICAN): >60 ML/MIN/1.73 M^2
GLUCOSE SERPL-MCNC: 97 MG/DL
HCT VFR BLD AUTO: 26.8 %
HGB BLD-MCNC: 8.8 G/DL
LYMPHOCYTES # BLD AUTO: 1.2 K/UL
LYMPHOCYTES NFR BLD: 16 %
MAGNESIUM SERPL-MCNC: 1.7 MG/DL
MCH RBC QN AUTO: 29.4 PG
MCHC RBC AUTO-ENTMCNC: 33 G/DL
MCV RBC AUTO: 89 FL
MONOCYTES # BLD AUTO: 0.4 K/UL
MONOCYTES NFR BLD: 5.4 %
NEUTROPHILS # BLD AUTO: 5.4 K/UL
NEUTROPHILS NFR BLD: 72.6 %
PLATELET # BLD AUTO: 324 K/UL
PMV BLD AUTO: 7 FL
POTASSIUM SERPL-SCNC: 3.5 MMOL/L
PROT SERPL-MCNC: 5.3 G/DL
RBC # BLD AUTO: 3.01 M/UL
SODIUM SERPL-SCNC: 138 MMOL/L
WBC # BLD AUTO: 7.5 K/UL

## 2018-12-28 PROCEDURE — 12000002 HC ACUTE/MED SURGE SEMI-PRIVATE ROOM

## 2018-12-28 PROCEDURE — 94761 N-INVAS EAR/PLS OXIMETRY MLT: CPT

## 2018-12-28 PROCEDURE — 25000003 PHARM REV CODE 250: Performed by: ORTHOPAEDIC SURGERY

## 2018-12-28 PROCEDURE — S0179 MEGESTROL 20 MG: HCPCS | Performed by: INTERNAL MEDICINE

## 2018-12-28 PROCEDURE — 63600175 PHARM REV CODE 636 W HCPCS: Performed by: INTERNAL MEDICINE

## 2018-12-28 PROCEDURE — 83880 ASSAY OF NATRIURETIC PEPTIDE: CPT

## 2018-12-28 PROCEDURE — 83735 ASSAY OF MAGNESIUM: CPT

## 2018-12-28 PROCEDURE — 97116 GAIT TRAINING THERAPY: CPT

## 2018-12-28 PROCEDURE — 36415 COLL VENOUS BLD VENIPUNCTURE: CPT

## 2018-12-28 PROCEDURE — 99233 SBSQ HOSP IP/OBS HIGH 50: CPT | Mod: ,,, | Performed by: INTERNAL MEDICINE

## 2018-12-28 PROCEDURE — 99233 PR SUBSEQUENT HOSPITAL CARE,LEVL III: ICD-10-PCS | Mod: ,,, | Performed by: INTERNAL MEDICINE

## 2018-12-28 PROCEDURE — 27000221 HC OXYGEN, UP TO 24 HOURS

## 2018-12-28 PROCEDURE — 85025 COMPLETE CBC W/AUTO DIFF WBC: CPT

## 2018-12-28 PROCEDURE — 97530 THERAPEUTIC ACTIVITIES: CPT

## 2018-12-28 PROCEDURE — 25000003 PHARM REV CODE 250: Performed by: SPECIALIST

## 2018-12-28 PROCEDURE — 80053 COMPREHEN METABOLIC PANEL: CPT

## 2018-12-28 PROCEDURE — 25000003 PHARM REV CODE 250: Performed by: INTERNAL MEDICINE

## 2018-12-28 RX ORDER — POTASSIUM CHLORIDE 750 MG/1
10 TABLET, EXTENDED RELEASE ORAL DAILY
Status: DISCONTINUED | OUTPATIENT
Start: 2018-12-28 | End: 2019-01-02 | Stop reason: HOSPADM

## 2018-12-28 RX ORDER — CARVEDILOL 6.25 MG/1
6.25 TABLET ORAL 2 TIMES DAILY WITH MEALS
Status: DISCONTINUED | OUTPATIENT
Start: 2018-12-28 | End: 2018-12-29

## 2018-12-28 RX ORDER — POTASSIUM CHLORIDE 20 MEQ/1
40 TABLET, EXTENDED RELEASE ORAL ONCE
Status: COMPLETED | OUTPATIENT
Start: 2018-12-28 | End: 2018-12-28

## 2018-12-28 RX ORDER — FUROSEMIDE 10 MG/ML
20 INJECTION INTRAMUSCULAR; INTRAVENOUS DAILY
Status: DISCONTINUED | OUTPATIENT
Start: 2018-12-28 | End: 2019-01-02 | Stop reason: HOSPADM

## 2018-12-28 RX ORDER — ATORVASTATIN CALCIUM 10 MG/1
10 TABLET, FILM COATED ORAL DAILY
Status: DISCONTINUED | OUTPATIENT
Start: 2018-12-28 | End: 2018-12-29

## 2018-12-28 RX ADMIN — POTASSIUM CHLORIDE 40 MEQ: 20 TABLET, EXTENDED RELEASE ORAL at 11:12

## 2018-12-28 RX ADMIN — FAMOTIDINE 20 MG: 20 TABLET ORAL at 08:12

## 2018-12-28 RX ADMIN — POTASSIUM CHLORIDE 10 MEQ: 750 TABLET, EXTENDED RELEASE ORAL at 11:12

## 2018-12-28 RX ADMIN — FUROSEMIDE 20 MG: 10 INJECTION, SOLUTION INTRAVENOUS at 11:12

## 2018-12-28 RX ADMIN — APIXABAN 5 MG: 2.5 TABLET, FILM COATED ORAL at 04:12

## 2018-12-28 RX ADMIN — HYDROCODONE BITARTRATE AND ACETAMINOPHEN 1 TABLET: 5; 325 TABLET ORAL at 05:12

## 2018-12-28 RX ADMIN — CARVEDILOL 6.25 MG: 6.25 TABLET, FILM COATED ORAL at 04:12

## 2018-12-28 RX ADMIN — MUPIROCIN 1 G: 20 OINTMENT TOPICAL at 09:12

## 2018-12-28 RX ADMIN — VALSARTAN 160 MG: 80 TABLET ORAL at 09:12

## 2018-12-28 RX ADMIN — HYDROCODONE BITARTRATE AND ACETAMINOPHEN 1 TABLET: 5; 325 TABLET ORAL at 09:12

## 2018-12-28 RX ADMIN — ATORVASTATIN CALCIUM 10 MG: 10 TABLET, FILM COATED ORAL at 11:12

## 2018-12-28 RX ADMIN — FAMOTIDINE 20 MG: 20 TABLET ORAL at 09:12

## 2018-12-28 RX ADMIN — VALSARTAN 160 MG: 80 TABLET ORAL at 08:12

## 2018-12-28 RX ADMIN — CARVEDILOL 3.12 MG: 3.12 TABLET, FILM COATED ORAL at 08:12

## 2018-12-28 RX ADMIN — HYDROCODONE BITARTRATE AND ACETAMINOPHEN 1 TABLET: 5; 325 TABLET ORAL at 08:12

## 2018-12-28 RX ADMIN — MEGESTROL ACETATE 200 MG: 40 SUSPENSION ORAL at 08:12

## 2018-12-28 RX ADMIN — ENOXAPARIN SODIUM 90 MG: 100 INJECTION SUBCUTANEOUS at 09:12

## 2018-12-28 RX ADMIN — ASPIRIN 81 MG CHEWABLE TABLET 81 MG: 81 TABLET CHEWABLE at 08:12

## 2018-12-28 NOTE — PROGRESS NOTES
Progress Note  Hospital Medicine  Patient Name:Adrian Osorio  MRN:  [id number]  Patient Class: IP- Inpatient  Admit Date: 12/21/2018  Length of Stay: 7 days  Expected Discharge Date:   Attending Physician: Pascale Nash MD  Primary Care Provider:  Martín Perales MD    SUBJECTIVE:     Principal Problem: Hip dislocation, left, initial encounter  Initial history of present illness: Mr. Osorio is an 84yo M with a PMH of Dementia, HTN, CAD/CABG 2008, CHF, ICMO with EF 35%. He presents to the ED with c/o left hip pain. Patient had a left hip arthroplasty secondary to a femoral neck fracture in October 2018. He was evaluated on 12/17/2018 and yesterday for left hip dislocation. The dislocation was reduced in the ED and he was placed in a abduction brace. The plan was to have a repair for instability done after Zeny.  He returns today with again L hip dislocation.  He is admitted to the service of hospital medicine and will be evaluated by the orthopedic surgeon.  Pt is a poor historian; information is obtained from ED and past medical records.    PMH/PSH/SH/FH/Meds: reviewed.    Symptoms/Review of Systems: s/p left hip arthroplasty revision. Patient is very [place]. Patient yesterday developed AF with RVR. Now in NSR.  No shortness of breath, cough, chest pain or headache, fever or abdominal pain.     Diet:  cardiac  Activity level: Up with assistance  Pain:  Patient reports no pain.       OBJECTIVE:   Vital Signs (Most Recent):      Temp: 98 °F (36.7 °C) (12/28/18 0723)  Pulse: 87 (12/28/18 0726)  Resp: (!) 22 (12/28/18 0723)  BP: (!) 169/86 (12/28/18 0726)  SpO2: 95 % (12/28/18 0726)       Vital Signs Range (Last 24H):  Temp:  [96 °F (35.6 °C)-99.4 °F (37.4 °C)]   Pulse:  [69-98]   Resp:  [18-22]   BP: (138-214)/()   SpO2:  [91 %-100 %]     Weight: 94.3 kg (207 lb 14.3 oz)  Body mass index is 28.2 kg/m².    Intake/Output Summary (Last 24 hours) at 12/28/2018 1022  Last data filed at 12/28/2018  0514  Gross per 24 hour   Intake 480 ml   Output 550 ml   Net -70 ml     Physical Examination:  Constitutional: He appears well-developed and well-nourished.   HENT:   Head: Normocephalic and atraumatic.   Eyes: Conjunctivae and EOM are normal. Pupils are equal, round, and reactive to light.   Neck: Normal range of motion. Neck supple. No JVD present.   Cardiovascular: Normal rate, regular rhythm, normal heart sounds and intact distal pulses.   Pulmonary/Chest: Effort normal and breath sounds normal. No stridor. No respiratory distress.   Abdominal: Soft. Bowel sounds are normal. He exhibits no distension. There is no tenderness.   Skin: Skin is warm and dry. Capillary refill takes 2 to 3 seconds.   Psychiatric: He has a normal mood and affect. His behavior is normal. Judgment normal.   Ext: Left hip dressing C/D/I. Hip brace in place.  Vitals reviewed.  CRANIAL NERVES   CN III, IV, VI   Pupils are equal, round, and reactive to light.  Extraocular motions are normal.      CBC:  Recent Labs   Lab 12/26/18  0549 12/27/18  0617 12/28/18  0538   WBC 7.30 6.20 7.50   RBC 2.92* 3.09* 3.01*   HGB 8.7* 9.1* 8.8*   HCT 26.3* 28.0* 26.8*    305 324   MCV 90 91 89   MCH 29.8 29.6 29.4   MCHC 33.1 32.6 33.0   BMP  Recent Labs   Lab 12/26/18  0549 12/27/18  0617 12/28/18  0538   GLU 94 87 97    140 138   K 3.6 3.3* 3.5    107 106   CO2 27 25 24   BUN 15 13 12   CREATININE 0.8 0.8 0.8   CALCIUM 7.8* 7.8* 8.0*   MG  --  1.7 1.7      Diagnostic Results:  Microbiology Results (last 7 days)     Procedure Component Value Units Date/Time    Culture, Anaerobic [[id number]] Collected:  12/24/18 1235    Order Status:  Completed Specimen:  Body Fluid from Hip, Left Updated:  12/28/18 0943     Anaerobic Culture Culture in progress    Aerobic culture [[id number]] Collected:  12/24/18 1235    Order Status:  Completed Specimen:  Body Fluid from Hip, Left Updated:  12/27/18 0933     Aerobic Bacterial Culture No growth          Left hip x-ray: Left hip dislocation    Left hip x-ray: Interval reduction of the superolateral dislocation.    ECHO:  · Moderate left ventricular enlargement.  · Decreased left ventricular systolic function. The estimated ejection fraction is 18%  · Global hypokinetic wall motion.  · Grade I (mild) left ventricular diastolic dysfunction consistent with impaired relaxation.  · Normal left atrial pressure.  · Severe left atrial enlargement.  · Mild right atrial enlargement.  · Mild aortic regurgitation.  · Mild-to-moderate mitral regurgitation.  · Indeterminate central venous pressure. Estimated PA pressure is at least 21.16 mmHg.    Assessment/Plan:     * s/p left total hip revision arthroplasty  Hip dislocation, left, initial encounter     Continue to follow Orthopedic recommendations.  Needs aggressive incentive spirometry.  Follow hemoglobin and hematocrit closely.  Pain control with PO narcotics and antiemetics as needed.  Physical therapy as per Orthopedics protocol with fall precautions.     Essential hypertension     Chronic/Controlled. Continue chronic medications, adjusting as needed.      Acute on chronic systolic heart failure/ ICMO   PAF     Stable.  Marked reduction in EF noted from prior ECHO. Follow Dr. Le's recommendations. May need AICD.  Start lasix 20 mg daily.  Start lipitor 10 mg daily.  Increase Coreg 6.25 mg PO BID. Continue ARB.      Alzheimer's dementia without behavioral disturbance     Stable. Continue Namenda, Aricept, and Seroquel.  Delirium precautions.  Fall precautions     Moderate malnutrition  Nutrition consulted. Encourage maximal PO intake. Diet supplementation ordered per nutrition approval. Will encourage PO and monitor closely for weight changes.    Acute surgical blood loss anemia  Follow CBC and transfuse as needed.    Hypokalemia  Replete KCl. Follow BMP. Start maintenance potassium supplementation.    SNF placement pending. I had a long discussion with patient's son  and updated him regarding markeldy reduced EF and CMP. He is agreeable to start Eliquis and aware of risks of NOAC including bleeding precautions and patient to observe fall precautions. Time spent in care of the patient, counseling and coordination of care (Greater than 50% spent in direct face to face contact): 35 min.    VTE Risk Mitigation (From admission, onward)        Ordered     apixaban tablet 5 mg  2 times daily      12/28/18 0557        Pascale Nash MD  Department of Hospital Medicine   [facility]

## 2018-12-28 NOTE — PROGRESS NOTES
[facility]  Orthopedics  Progress Note    Patient Name: Adrian Osorio  MRN: [id number]  Admission Date: 12/21/2018  Hospital Length of Stay: 7 days  Attending Provider: Pascale Nash MD  Primary Care Provider: Martín Perales MD  Follow-up For: Procedure(s) (LRB):  REVISION, TOTAL ARTHROPLASTY, HIP (Left)    Post-Operative Day: 4 Days Post-Op  Subjective:     Principal Problem:Hip dislocation, left, initial encounter    Principal Orthopedic Problem: S/P L KAYLEE    Interval History: fell last night and hit his head.    Review of patient's allergies indicates:  No Known Allergies    Current Facility-Administered Medications   Medication    aspirin chewable tablet 81 mg    carvedilol tablet 3.125 mg    enoxaparin injection 90 mg    famotidine tablet 20 mg    HYDROcodone-acetaminophen 5-325 mg per tablet 1 tablet    megestrol 400 mg/10 mL (10 mL) suspension 200 mg    mupirocin 2 % ointment 1 g    sodium chloride 0.9% flush 5 mL    valsartan tablet 160 mg     Objective:     Vital Signs (Most Recent):  Temp: 98 °F (36.7 °C) (12/28/18 0723)  Pulse: 87 (12/28/18 0726)  Resp: (!) 22 (12/28/18 0723)  BP: (!) 169/86 (12/28/18 0726)  SpO2: 95 % (12/28/18 0726) Vital Signs (24h Range):  Temp:  [96 °F (35.6 °C)-99.4 °F (37.4 °C)] 98 °F (36.7 °C)  Pulse:  [69-98] 87  Resp:  [18-22] 22  SpO2:  [91 %-100 %] 95 %  BP: (138-214)/() 169/86     Weight: 94.3 kg (207 lb 14.3 oz)  Height: 6' (182.9 cm)(office 12/14)  Body mass index is 28.2 kg/m².      Intake/Output Summary (Last 24 hours) at 12/28/2018 0858  Last data filed at 12/28/2018 0514  Gross per 24 hour   Intake 480 ml   Output 550 ml   Net -70 ml       General    Nursing note and vitals reviewed.  Constitutional: He appears well-developed and well-nourished.   Pulmonary/Chest: Effort normal.   Abdominal: Soft. Bowel sounds are normal.   Neurological: He is alert.   Psychiatric: He has a normal mood and affect.         Left Hip Exam      Comments:  Brace in place. Dressing/ incision looks good. LLE DNVI.            Significant Labs:   CBC:   Recent Labs   Lab 12/27/18  0617 12/28/18  0538   WBC 6.20 7.50   HGB 9.1* 8.8*   HCT 28.0* 26.8*    324     CMP:   Recent Labs   Lab 12/27/18  0617 12/28/18  0538    138   K 3.3* 3.5    106   CO2 25 24   GLU 87 97   BUN 13 12   CREATININE 0.8 0.8   CALCIUM 7.8* 8.0*   PROT 5.1* 5.3*   ALBUMIN 1.9* 2.0*   BILITOT 0.3 0.3   ALKPHOS 67 75   AST 21 26   ALT 12 14   ANIONGAP 8 8   EGFRNONAA >60 >60     All pertinent labs within the past 24 hours have been reviewed.    Significant Imaging: None    Assessment/Plan:     * Hip dislocation, left, initial encounter    Stable from a postoperative perspective.  SNF has been consulted  Because of fall last night I will order L hip X-rays           MERCEDES GUEVARA  Orthopedics  [facility]

## 2018-12-28 NOTE — PLAN OF CARE
Problem: Adult Inpatient Plan of Care  Goal: Plan of Care Review  Outcome: Ongoing (interventions implemented as appropriate)  Plan of care reviewed with patient & daughter. A-fib on telemetry. Dressing to L. Hip intact with minimal amount of drainage noted. L. Hip immobilizer intact. Scd/ foot pump in use. O2-2L per NC in use. No complaints of pain during night. Healing bruise noted to R. Eye. Contusion noted to R.posterior lower head. Remains free from falls/injury. avasys in use, bed alarm set. Bed in low & locked position. Instructed to call for assistance as needed during night. Verbalized understanding. Call light in reach. Daughter at bedside.

## 2018-12-28 NOTE — PT/OT/SLP PROGRESS
Physical Therapy Treatment    Patient Name:  Adrian Osorio   MRN:  [id number]    Recommendations:     Discharge Recommendations:      Discharge Equipment Recommendations:     Barriers to discharge: None    Assessment:     Adrian Osorio is a 85 y.o. male admitted with a medical diagnosis of Hip dislocation, left, initial encounter.  He presents with the following impairments/functional limitations:  weakness, impaired endurance, impaired self care skills, impaired functional mobilty, gait instability, impaired balance, decreased lower extremity function, pain, decreased safety awareness, impaired cardiopulmonary response to activity, orthopedic precautions .    Rehab Prognosis: Good; patient would benefit from acute skilled PT services to address these deficits and reach maximum level of function.    Recent Surgery: Procedure(s) (LRB):  REVISION, TOTAL ARTHROPLASTY, HIP (Left) 4 Days Post-Op    Plan:     During this hospitalization, patient to be seen BID to address the identified rehab impairments via gait training, therapeutic activities, therapeutic exercises and progress toward the following goals:    · Plan of Care Expires:       Subjective     Chief Complaint: pain with movement L hip  Patient/Family Comments/goals: to return home  Pain/Comfort:  · Pain Rating 1: other (see comments)(did not rate.)  · Location - Side 1: Left  · Location - Orientation 1: generalized  · Location 1: hip  · Pain Addressed 1: Reposition, Nurse notified      Objective:     Communicated with nurse Castaneda prior to session.  Patient found seated in chair with oxygen, telemetry, hip abduction brace([employer], chair alarm)  upon PT entry to room.     General Precautions: Standard, fall, hearing impaired   Orthopedic Precautions:LLE weight bearing as tolerated, LLE posterior precautions   Braces: Hip abduction brace     Functional Mobility:  · Bed Mobility:     · Rolling Right: minimum assistance  · Sit to Supine: maximal  assistance  · Transfers:     · Sit to Stand:  maximal assistance with rolling walker  · Gait: 15' with rw and Mod A, WBAT LLE with hio abd brace in place      AM-PAC 6 CLICK MOBILITY          Therapeutic Activities and Exercises:       Patient left supine with all lines intact, call button in reach, bed alarm on, nurse Tonya notified and daughter and Carol[employer]  present..    GOALS:   Multidisciplinary Problems     Physical Therapy Goals        Problem: Physical Therapy Goal    Goal Priority Disciplines Outcome Goal Variances Interventions   Physical Therapy Goal     PT, PT/OT Ongoing (interventions implemented as appropriate)     Description:  Goals to be met by: 2019     Patient will increase functional independence with mobility by performin). Supine to sit with Stand-by Assistance  2). Sit to supine with Stand-by Assistance  3). Sit to stand transfer with Contact Guard Assistance  4). Bed to chair transfer with Contact Guard Assistance using Rolling Walker  5). Gait  x  > 50 feet with Contact Guard Assistance using Rolling Walker.                       Time Tracking:     PT Received On: 18  PT Start Time: 1145     PT Stop Time: 1155  PT Total Time (min): 10 min     Billable Minutes: Gait Training 10min    Treatment Type: Treatment  PT/PTA: PTA     PTA Visit Number: 2     Tammi Gonzalez, PTA  2018

## 2018-12-28 NOTE — CONSULTS
[]Hide copied text    []Alan for details      Cardiology Consult  Patient Name:Adrian Osorio  MRN:  [id number]  Patient Class: IP- Inpatient  Admit Date: 12/21/2018  Length of Stay: 3 days  Expected Discharge Date:   Attending Physician: Pascale Nash MD  Primary Care Provider:  Martín Perales MD     SUBJECTIVE:      Principal Problem: Hip dislocation, left, initial encounter ; PAF with RVR.  Initial history of present illness: Mr. Osorio is an 86yo M with a PMH of Dementia,severe [place], HTN, CAD/CABG 2008 in [place], [place], CHF, ICMO with EF 35%. He presents to the ED with c/o left hip pain. Patient had a left hip arthroplasty secondary to a femoral neck fracture in October 2018. He was evaluated on 12/17/2018 and yesterday for left hip dislocation. The dislocation was reduced in the ED and he was placed in a abduction brace. The plan was to have a repair for instability done after Zeny.  He returns today with again L hip dislocation.  He underwent L total hip revision arthroplasty on 12/24/18.  Pt is a poor historian; information is obtained from ED and past medical records. He developed AF with RVR earlier today and has since converted to NSR.     PMH/PSH/SH/FH/Meds: reviewed.     Symptoms/Review of Systems:  No shortness of breath, cough, chest pain or headache, fever or abdominal pain.  No palpitation or dizziness.  Activity level: Up with assistance  Pain:  Patient reports no pain.        OBJECTIVE:   Vital Signs (Most Recent):                                         Temp: 98.6 °F (37 °C) (12/24/18 0945)  Pulse: 71 (12/24/18 0945)  Resp: 18 (12/24/18 0945)  BP: (!) 162/75 (12/24/18 0945)  SpO2: 95 % (12/24/18 0945)                               Vital Signs Range (Last 24H):  Temp:  [96.4 °F (35.8 °C)-98.6 °F (37 °C)]   Pulse:  [71-79]   Resp:  [18-20]   BP: (137-171)/(58-75)   SpO2:  [92 %-96 %]      Weight: 94.3 kg (208 lb)  Body mass index is 28.21  kg/m².     Intake/Output Summary (Last 24 hours) at 12/24/2018 1029  Last data filed at 12/24/2018 0500      Gross per 24 hour   Intake 495 ml   Output 925 ml   Net -430 ml      Physical Examination:  Constitutional: He appears well-developed and well-nourished.   HENT: severe [place].  Head: Normocephalic and atraumatic. Near edentulous.  Eyes: Conjunctivae and EOM are normal. Pupils are equal, round, and reactive to light. Echymosis R orbit  Neck: Normal range of motion. Neck supple. No JVD present.   Cardiovascular: Normal rate, regular rhythm, normal heart sounds and intact distal pulses.   Pulmonary/Chest: Effort normal and breath sounds normal. No stridor. No respiratory distress.   Abdominal: Soft. Bowel sounds are normal. He exhibits no distension. There is no tenderness.   Genitourinary:   Genitourinary Comments: deferred   Immobilizer in use to L hip   Neurological: He is alert.   Skin: Skin is warm and dry. Capillary refill takes 2 to 3 seconds.   Psychiatric: He has a normal mood and affect. His behavior is normal. Judgment normal.   Vitals reviewed.  CRANIAL NERVES   CN III, IV, VI   Pupils are equal, round, and reactive to light.  Extraocular motions are normal.      CBC:        Recent Labs   Lab 12/17/18  1246 12/21/18  1608 12/24/18  0528   WBC 8.60 6.10 5.80   RBC 4.12* 3.39* 3.56*   HGB 12.2* 9.8* 10.6*   HCT 37.6* 30.5* 31.8*   * 343 319   MCV 91 90 89   MCH 29.7 29.0 29.7   MCHC 32.5 32.3 33.2   BMP        Recent Labs   Lab 12/17/18  1246 12/21/18  1608 12/24/18  0528   * 77 83    138 138   K 4.1 4.2 4.0    109 105   CO2 24 22* 25   BUN 14 13 10   CREATININE 0.9 0.8 0.8   CALCIUM 9.3 8.2* 8.5*      Diagnostic Results:      Microbiology Results (last 7 days)      ** No results found for the last 168 hours. **          Left hip x-ray: Left hip dislocation     Left hip x-ray: Interval reduction of the superolateral dislocation.    CXR :  Elevated R diaphragm. Ascending aortic  ectasia. No pulm edema.    EKG  :   AF with RVR ; RBBB, LAHB. Possible inferior wall scar. Poor R progression; Mild IVCD    Echo   : EF 20 - 25 %; global LV hypokinesia.     Assessment/Plan:          * Hip dislocation, left, initial encounter     Revision hip arthroplasty 12/24/18      Essential hypertension     Chronic/Controlled. Continue chronic medications, adjusting as needed.      Chronic systolic heart failure/ ICMO EF 40%     Stable.  Continue BB, diuretic, and ARB - increase valsartan to 160 mg bid.  Last Echo 10/24/18: The estimated LV ejection fraction is 40 %. LV systolic function is mildly to moderately reduced. Diastolic function is indeterminate.  Prior CABG.    PAF ; now back in NSR.   Ideally will need anticoagulation with Coumadin or NOACs,if rec AF. Consider amio if rec AF.  Flonase; humidify O2.      Alzheimer's dementia without behavioral disturbance     Stable. Continue Namenda, Aricept, and Seroquel.  Delirium precautions. Severe [place].  Fall precautions             VTE Risk Mitigation (From admission, onward)         Ordered       IP VTE HIGH RISK PATIENT  Once      12/21/18 1938       enoxaparin injection 40 mg  Every 24 hours (non-standard times)      12/21/18 2043          JANAY Hammond  Cardiology   [facility]

## 2018-12-28 NOTE — SUBJECTIVE & OBJECTIVE
Principal Problem:Hip dislocation, left, initial encounter    Principal Orthopedic Problem: S/P L KAYLEE    Interval History: fell last night and hit his head.    Review of patient's allergies indicates:  No Known Allergies    Current Facility-Administered Medications   Medication    aspirin chewable tablet 81 mg    carvedilol tablet 3.125 mg    enoxaparin injection 90 mg    famotidine tablet 20 mg    HYDROcodone-acetaminophen 5-325 mg per tablet 1 tablet    megestrol 400 mg/10 mL (10 mL) suspension 200 mg    mupirocin 2 % ointment 1 g    sodium chloride 0.9% flush 5 mL    valsartan tablet 160 mg     Objective:     Vital Signs (Most Recent):  Temp: 98 °F (36.7 °C) (12/28/18 0723)  Pulse: 87 (12/28/18 0726)  Resp: (!) 22 (12/28/18 0723)  BP: (!) 169/86 (12/28/18 0726)  SpO2: 95 % (12/28/18 0726) Vital Signs (24h Range):  Temp:  [96 °F (35.6 °C)-99.4 °F (37.4 °C)] 98 °F (36.7 °C)  Pulse:  [69-98] 87  Resp:  [18-22] 22  SpO2:  [91 %-100 %] 95 %  BP: (138-214)/() 169/86     Weight: 94.3 kg (207 lb 14.3 oz)  Height: 6' (182.9 cm)(office 12/14)  Body mass index is 28.2 kg/m².      Intake/Output Summary (Last 24 hours) at 12/28/2018 0858  Last data filed at 12/28/2018 0514  Gross per 24 hour   Intake 480 ml   Output 550 ml   Net -70 ml       General    Nursing note and vitals reviewed.  Constitutional: He appears well-developed and well-nourished.   Pulmonary/Chest: Effort normal.   Abdominal: Soft. Bowel sounds are normal.   Neurological: He is alert.   Psychiatric: He has a normal mood and affect.         Left Hip Exam     Comments:  Brace in place. Dressing/ incision looks good. LLE DNVI.            Significant Labs:   CBC:   Recent Labs   Lab 12/27/18  0617 12/28/18  0538   WBC 6.20 7.50   HGB 9.1* 8.8*   HCT 28.0* 26.8*    324     CMP:   Recent Labs   Lab 12/27/18  0617 12/28/18  0538    138   K 3.3* 3.5    106   CO2 25 24   GLU 87 97   BUN 13 12   CREATININE 0.8 0.8   CALCIUM 7.8* 8.0*    PROT 5.1* 5.3*   ALBUMIN 1.9* 2.0*   BILITOT 0.3 0.3   ALKPHOS 67 75   AST 21 26   ALT 12 14   ANIONGAP 8 8   EGFRNONAA >60 >60     All pertinent labs within the past 24 hours have been reviewed.    Significant Imaging: None

## 2018-12-28 NOTE — NURSING
To CT via bed with CT tech. 2000- returned from CT via bed . No distress noted. Bed alarm on , Avasys in use.

## 2018-12-28 NOTE — PLAN OF CARE
I sent the pts PPD placement and chest xray to [facility] and rehab via [facility]. Chelsey Bolaños LMSW     12/28/18 0855   Post-Acute Status   Post-Acute Authorization Placement   Post-Acute Placement Status Pending Clinical Review

## 2018-12-28 NOTE — NURSING
"Call to room by daughter at 1900, pt found pt sitting on butt with head resting against armrest of pull out bed, yanirather states "the bump woke my up, daughter assumed pt hit his head on end table in room, hematoma noted, body assessment performed, no other injuries present at this time, side rails up x 4, pt appear to have climb over side rails, pt denies HA, joint pain/discomfort at this time, Dr Nash made aware, new order CT without contrast of head, continue neuro checks and observe for falls, avasyst place in pt room for extra safety and new bed provided, due to bed alarm set but did not sound at time of fall, pt states "I was trying to use the bathroom," will continue to monitor, observe and note any changes, safety maintain  "

## 2018-12-28 NOTE — PLAN OF CARE
"Problem: Adult Inpatient Plan of Care  Goal: Plan of Care Review  Outcome: Ongoing (interventions implemented as appropriate)  Plan of care review with pt, pt states "understanding," no redness/swelling noted to IV site, +2 edema palpated to left hip with immobilizer in place, + nimo palpable pedal pulses, bruise noted to right eye with lower eyelid swelling s/p fall at home, pt is [place], perrla, strong and equal hand grasp, hematoma posterior head due to fall on yesterday, pt denies HA at this time, pt exhibits cognitive deficit AEB pt unable to recall recent information just told to him a short while ago, telemonitoring in progress, will continue to monitor, observe and note any changes, safety maintain    1740- PPD to left forearm is negative/0mm  "

## 2018-12-28 NOTE — PLAN OF CARE
Problem: Physical Therapy Goal  Goal: Physical Therapy Goal  Goals to be met by: 2019     Patient will increase functional independence with mobility by performin). Supine to sit with Stand-by Assistance  2). Sit to supine with Stand-by Assistance  3). Sit to stand transfer with Contact Guard Assistance  4). Bed to chair transfer with Contact Guard Assistance using Rolling Walker  5). Gait  x  > 50 feet with Contact Guard Assistance using Rolling Walker.      Outcome: Ongoing (interventions implemented as appropriate)  Ambulated with rw and A for safety , WBAT LLE with Hip abd brace in place.

## 2018-12-28 NOTE — PT/OT/SLP PROGRESS
Physical Therapy Treatment    Patient Name:  Adrian Osorio   MRN:  [id number]    Recommendations:     Discharge Recommendations:      Discharge Equipment Recommendations:     Barriers to discharge: None    Assessment:     Adrian Osorio is a 85 y.o. male admitted with a medical diagnosis of Hip dislocation, left, initial encounter.  He presents with the following impairments/functional limitations:  weakness, impaired endurance, impaired self care skills, impaired functional mobilty, gait instability, decreased safety awareness, pain, decreased lower extremity function, orthopedic precautions .     Rehab Prognosis: Good; patient would benefit from acute skilled PT services to address these deficits and reach maximum level of function.    Recent Surgery: Procedure(s) (LRB):  REVISION, TOTAL ARTHROPLASTY, HIP (Left) 4 Days Post-Op    Plan:     During this hospitalization, patient to be seen BID to address the identified rehab impairments via gait training, therapeutic activities, therapeutic exercises and progress toward the following goals:    · Plan of Care Expires:       Subjective     Chief Complaint: pain with movement L hip  Patient/Family Comments/goals: to return home  Pain/Comfort:  · Pain Rating 1: 7/10  · Location - Side 1: Left  · Location - Orientation 1: generalized  · Location 1: hip  · Pain Addressed 1: Reposition, Nurse notified      Objective:     Communicated with nurse Castaneda prior to session.  Patient found supine in bed with oxygen, telemetry, SCD, hip abduction brace, bed alarm([employer] at bedside)  upon PT entry to room.     General Precautions: Standard, fall, hearing impaired   Orthopedic Precautions:LLE weight bearing as tolerated, LLE posterior precautions   Braces: Hip abduction brace     Functional Mobility:  · Bed Mobility:     · Rolling Left:  maximal assistance  · Supine to Sit: maximal assistance  · Transfers:     · Sit to Stand:  maximal assistance with rolling  walker  · Gait: 15' with rw and Mod A, with O2 attached, WBAT LLE.      AM-PAC 6 CLICK MOBILITY          Therapeutic Activities and Exercises:   Transferred to sitting EOB with A for safety. Reviewed hip precautions with patient with activity, very [place]. Sat EOB ,then SPT to chair at bedside with Mod A . Sat up in chair with chair alarm in place and daughter at bedside.    Patient left up in chair with all lines intact, call button in reach, chair alarm on, jett Castaneda notified and daughter and [employer] present..    GOALS:   Multidisciplinary Problems     Physical Therapy Goals        Problem: Physical Therapy Goal    Goal Priority Disciplines Outcome Goal Variances Interventions   Physical Therapy Goal     PT, PT/OT Ongoing (interventions implemented as appropriate)     Description:  Goals to be met by: 2019     Patient will increase functional independence with mobility by performin). Supine to sit with Stand-by Assistance  2). Sit to supine with Stand-by Assistance  3). Sit to stand transfer with Contact Guard Assistance  4). Bed to chair transfer with Contact Guard Assistance using Rolling Walker  5). Gait  x  > 50 feet with Contact Guard Assistance using Rolling Walker.                       Time Tracking:     PT Received On: 18  PT Start Time: 0950     PT Stop Time: 1015  PT Total Time (min): 25 min     Billable Minutes: Gait Training 12min and Therapeutic Activity 13min    Treatment Type: Treatment  PT/PTA: PTA     PTA Visit Number: 2     Tammi Gonzalez, RICCI  2018

## 2018-12-28 NOTE — ASSESSMENT & PLAN NOTE
Stable from a postoperative perspective.  SNF has been consulted  Because of fall last night I will order L hip X-rays

## 2018-12-28 NOTE — PLAN OF CARE
12/28/18 1100   PRE-TX-O2-ETCO2   O2 Device (Oxygen Therapy) nasal cannula   $ Is the patient on Low Flow Oxygen? Yes   Flow (L/min) 2   SpO2 95 %   Pulse Oximetry Type Intermittent   $ Pulse Oximetry - Multiple Charge Pulse Oximetry - Multiple   Pulse 88   Resp 18

## 2018-12-29 LAB
ALBUMIN SERPL BCP-MCNC: 2.1 G/DL
ALLENS TEST: ABNORMAL
ALP SERPL-CCNC: 78 U/L
ALT SERPL W/O P-5'-P-CCNC: 17 U/L
ANION GAP SERPL CALC-SCNC: 6 MMOL/L
AST SERPL-CCNC: 21 U/L
BASOPHILS # BLD AUTO: 0 K/UL
BASOPHILS NFR BLD: 0.4 %
BILIRUB SERPL-MCNC: 0.4 MG/DL
BUN SERPL-MCNC: 12 MG/DL
CALCIUM SERPL-MCNC: 8.3 MG/DL
CHLORIDE SERPL-SCNC: 106 MMOL/L
CO2 SERPL-SCNC: 26 MMOL/L
CREAT SERPL-MCNC: 0.8 MG/DL
D DIMER PPP IA.FEU-MCNC: 7.27 MG/L FEU
DELSYS: ABNORMAL
DIFFERENTIAL METHOD: ABNORMAL
EOSINOPHIL # BLD AUTO: 0.6 K/UL
EOSINOPHIL NFR BLD: 8.2 %
ERYTHROCYTE [DISTWIDTH] IN BLOOD BY AUTOMATED COUNT: 15.5 %
EST. GFR  (AFRICAN AMERICAN): >60 ML/MIN/1.73 M^2
EST. GFR  (NON AFRICAN AMERICAN): >60 ML/MIN/1.73 M^2
FIO2: 40
FLOW: 5
GLUCOSE SERPL-MCNC: 95 MG/DL
HCO3 UR-SCNC: 21.8 MMOL/L (ref 24–28)
HCT VFR BLD AUTO: 27.4 %
HGB BLD-MCNC: 9.1 G/DL
LYMPHOCYTES # BLD AUTO: 1.4 K/UL
LYMPHOCYTES NFR BLD: 20.3 %
MAGNESIUM SERPL-MCNC: 1.8 MG/DL
MCH RBC QN AUTO: 29.5 PG
MCHC RBC AUTO-ENTMCNC: 33.1 G/DL
MCV RBC AUTO: 89 FL
MODE: ABNORMAL
MONOCYTES # BLD AUTO: 0.5 K/UL
MONOCYTES NFR BLD: 7.7 %
NEUTROPHILS # BLD AUTO: 4.3 K/UL
NEUTROPHILS NFR BLD: 63.4 %
PCO2 BLDA: 31.6 MMHG (ref 35–45)
PH SMN: 7.45 [PH] (ref 7.35–7.45)
PLATELET # BLD AUTO: 357 K/UL
PMV BLD AUTO: 7.1 FL
PO2 BLDA: 79 MMHG (ref 80–100)
POC BE: -2 MMOL/L
POC SATURATED O2: 96 % (ref 95–100)
POC TCO2: 23 MMOL/L (ref 23–27)
POCT GLUCOSE: 109 MG/DL (ref 70–110)
POTASSIUM SERPL-SCNC: 3.8 MMOL/L
PROT SERPL-MCNC: 5.7 G/DL
RBC # BLD AUTO: 3.08 M/UL
SAMPLE: ABNORMAL
SITE: ABNORMAL
SODIUM SERPL-SCNC: 138 MMOL/L
TB INDURATION 48 - 72 HR READ: 0 MM
TROPONIN I SERPL DL<=0.01 NG/ML-MCNC: 0.03 NG/ML
WBC # BLD AUTO: 6.9 K/UL

## 2018-12-29 PROCEDURE — 36415 COLL VENOUS BLD VENIPUNCTURE: CPT

## 2018-12-29 PROCEDURE — 25000003 PHARM REV CODE 250: Performed by: SPECIALIST

## 2018-12-29 PROCEDURE — 63600175 PHARM REV CODE 636 W HCPCS: Performed by: INTERNAL MEDICINE

## 2018-12-29 PROCEDURE — 25000003 PHARM REV CODE 250: Performed by: INTERNAL MEDICINE

## 2018-12-29 PROCEDURE — 36600 WITHDRAWAL OF ARTERIAL BLOOD: CPT

## 2018-12-29 PROCEDURE — S0179 MEGESTROL 20 MG: HCPCS | Performed by: INTERNAL MEDICINE

## 2018-12-29 PROCEDURE — 97116 GAIT TRAINING THERAPY: CPT

## 2018-12-29 PROCEDURE — 25500020 PHARM REV CODE 255

## 2018-12-29 PROCEDURE — 97530 THERAPEUTIC ACTIVITIES: CPT

## 2018-12-29 PROCEDURE — 25000003 PHARM REV CODE 250: Performed by: ORTHOPAEDIC SURGERY

## 2018-12-29 PROCEDURE — 80053 COMPREHEN METABOLIC PANEL: CPT

## 2018-12-29 PROCEDURE — 85025 COMPLETE CBC W/AUTO DIFF WBC: CPT

## 2018-12-29 PROCEDURE — 25000003 PHARM REV CODE 250: Performed by: HOSPITALIST

## 2018-12-29 PROCEDURE — 99900035 HC TECH TIME PER 15 MIN (STAT)

## 2018-12-29 PROCEDURE — 83735 ASSAY OF MAGNESIUM: CPT

## 2018-12-29 PROCEDURE — 93005 ELECTROCARDIOGRAM TRACING: CPT

## 2018-12-29 PROCEDURE — 85379 FIBRIN DEGRADATION QUANT: CPT

## 2018-12-29 PROCEDURE — 82803 BLOOD GASES ANY COMBINATION: CPT

## 2018-12-29 PROCEDURE — 12000002 HC ACUTE/MED SURGE SEMI-PRIVATE ROOM

## 2018-12-29 PROCEDURE — 94761 N-INVAS EAR/PLS OXIMETRY MLT: CPT

## 2018-12-29 PROCEDURE — 84484 ASSAY OF TROPONIN QUANT: CPT

## 2018-12-29 RX ORDER — ONDANSETRON 2 MG/ML
4 INJECTION INTRAMUSCULAR; INTRAVENOUS EVERY 6 HOURS PRN
Status: DISCONTINUED | OUTPATIENT
Start: 2018-12-29 | End: 2019-01-02 | Stop reason: HOSPADM

## 2018-12-29 RX ORDER — ATORVASTATIN CALCIUM 40 MG/1
40 TABLET, FILM COATED ORAL DAILY
Status: DISCONTINUED | OUTPATIENT
Start: 2018-12-29 | End: 2019-01-02 | Stop reason: HOSPADM

## 2018-12-29 RX ORDER — CARVEDILOL 3.12 MG/1
3.12 TABLET ORAL 2 TIMES DAILY WITH MEALS
Status: DISCONTINUED | OUTPATIENT
Start: 2018-12-29 | End: 2019-01-02 | Stop reason: HOSPADM

## 2018-12-29 RX ADMIN — HYDROCODONE BITARTRATE AND ACETAMINOPHEN 1 TABLET: 5; 325 TABLET ORAL at 02:12

## 2018-12-29 RX ADMIN — APIXABAN 5 MG: 2.5 TABLET, FILM COATED ORAL at 09:12

## 2018-12-29 RX ADMIN — FUROSEMIDE 20 MG: 10 INJECTION, SOLUTION INTRAVENOUS at 09:12

## 2018-12-29 RX ADMIN — CARVEDILOL 3.12 MG: 3.12 TABLET, FILM COATED ORAL at 05:12

## 2018-12-29 RX ADMIN — MEGESTROL ACETATE 200 MG: 40 SUSPENSION ORAL at 09:12

## 2018-12-29 RX ADMIN — LIDOCAINE HYDROCHLORIDE: 20 SOLUTION ORAL; TOPICAL at 12:12

## 2018-12-29 RX ADMIN — ONDANSETRON 4 MG: 2 INJECTION INTRAMUSCULAR; INTRAVENOUS at 12:12

## 2018-12-29 RX ADMIN — APIXABAN 5 MG: 2.5 TABLET, FILM COATED ORAL at 08:12

## 2018-12-29 RX ADMIN — IOHEXOL 75 ML: 350 INJECTION, SOLUTION INTRAVENOUS at 02:12

## 2018-12-29 RX ADMIN — ATORVASTATIN CALCIUM 40 MG: 40 TABLET, FILM COATED ORAL at 05:12

## 2018-12-29 RX ADMIN — POTASSIUM CHLORIDE 10 MEQ: 750 TABLET, EXTENDED RELEASE ORAL at 09:12

## 2018-12-29 RX ADMIN — SODIUM CHLORIDE 500 ML: 0.9 INJECTION, SOLUTION INTRAVENOUS at 02:12

## 2018-12-29 RX ADMIN — CARVEDILOL 6.25 MG: 6.25 TABLET, FILM COATED ORAL at 09:12

## 2018-12-29 RX ADMIN — ATORVASTATIN CALCIUM 10 MG: 10 TABLET, FILM COATED ORAL at 09:12

## 2018-12-29 RX ADMIN — FAMOTIDINE 20 MG: 20 TABLET ORAL at 09:12

## 2018-12-29 RX ADMIN — FAMOTIDINE 20 MG: 20 TABLET ORAL at 08:12

## 2018-12-29 RX ADMIN — ASPIRIN 81 MG CHEWABLE TABLET 81 MG: 81 TABLET CHEWABLE at 09:12

## 2018-12-29 RX ADMIN — VALSARTAN 160 MG: 80 TABLET ORAL at 09:12

## 2018-12-29 NOTE — SIGNIFICANT EVENT
"PCT notified nurse 313 son called stating, "patient was unconscious"  Upon arrival in room patient in chair, slumped over not responding, weak thready pulse, 02 81% via NC.     1400: RRT called.   1402: Transferred to bed x4 via Ann Marie-RN, Uriah-RN, Alexandria-RN, and CASSANDRA Valentin.  1405- Arelis-house sup, Dr. Segovia, Lakesha-ICU, Faviola Jacobsen, and April-Resp present.   1405:   1407: Pt. Became responsive, L facial drop noticed with slurred speech.   1409: orders ABG, EKG, troponin, D-Dimer, CT of head and 500 cc bolus NS placed  1410: Bolus started  1410: Dr. Mortensen at bedside.  1411: Shaquille Patel at bedside. Orders for CT non-contract, and CT of head and neck with contrast ordered.   1415: EKG completed and handed to Dr. Mortensen   1418: ABG done/EKG done.   1420: Lab at bedside.  1421: Pt. Resting in bed with son at side.       "

## 2018-12-29 NOTE — PLAN OF CARE
12/29/18 1131   PRE-TX-O2-ETCO2   O2 Device (Oxygen Therapy) room air   SpO2 97 %   Pulse Oximetry Type Intermittent   $ Pulse Oximetry - Multiple Charge Pulse Oximetry - Multiple   Pulse 85   Resp 18

## 2018-12-29 NOTE — PLAN OF CARE
Problem: Physical Therapy Goal  Goal: Physical Therapy Goal  Goals to be met by: 2019     Patient will increase functional independence with mobility by performin). Supine to sit with Stand-by Assistance  2). Sit to supine with Stand-by Assistance  3). Sit to stand transfer with Contact Guard Assistance  4). Bed to chair transfer with Contact Guard Assistance using Rolling Walker  5). Gait  x  > 50 feet with Contact Guard Assistance using Rolling Walker.      Outcome: Outcome(s) achieved Date Met: 18  PT for bed mobility, transfer and gait training

## 2018-12-29 NOTE — PLAN OF CARE
12/28/18 2055   PRE-TX-O2-ETCO2   O2 Device (Oxygen Therapy) nasal cannula   $ Is the patient on Low Flow Oxygen? Yes   Flow (L/min) 2   Oxygen Concentration (%) 28   SpO2 96 %   Pulse Oximetry Type Intermittent   $ Pulse Oximetry - Multiple Charge Pulse Oximetry - Multiple   Ready to Wean/Extubation Screen   FIO2<=50 (chart decimal) 0.28

## 2018-12-29 NOTE — PLAN OF CARE
Problem: Adult Inpatient Plan of Care  Goal: Plan of Care Review  Outcome: Ongoing (interventions implemented as appropriate)  Plan of care reviewed, patient needs reinforcement with understanding. Alert to self/place, disoriented to time and situation. Urinal at bedside.  PIV infusing per order. Dressing to L hip, CDI. Immobilizer on L leg at all times per orders. VS addresssed. O2@2L NC. Tele monitored, NSR. Neurovascular checks Q4H maintined. Neurological checks Q2H maintained per orders. SEDS in place. Wt. Bearing as tolerated. PT/OT. Telesitter at bedside. Remain afebrile throughout shift. Moderate pain control with PRN meds. Family at bedside at all times. [place]. Bed in lowest position, SR x2, brakes locked, call light within reach. Patient remains free from fall and injury, no new skin breakdown noted. Will continue to monitor.

## 2018-12-29 NOTE — PT/OT/SLP PROGRESS
Physical Therapy Treatment    Patient Name:  Adrian Osorio   MRN:  [id number]    Recommendations:     Discharge Recommendations:  nursing facility, skilled       Assessment:     Adrian Osorio is a 85 y.o. male admitted with a medical diagnosis of Hip dislocation, left, initial encounter.  He presents with the following impairments/functional limitations:  weakness, impaired endurance, impaired self care skills, impaired functional mobilty, gait instability, impaired balance, decreased coordination, decreased lower extremity function, decreased safety awareness, pain, impaired cardiopulmonary response to activity, orthopedic precautions .    Rehab Prognosis: Good; patient would benefit from acute skilled PT services to address these deficits and reach maximum level of function.    Recent Surgery: Procedure(s) (LRB):  REVISION, TOTAL ARTHROPLASTY, HIP (Left) 5 Days Post-Op    Plan:     During this hospitalization, patient to be seen BID to address the identified rehab impairments via gait training, therapeutic activities, therapeutic exercises and progress toward the following goals:    · Plan of Care Expires:       Subjective     Chief Complaint: expressing pain with mobility  Patient/Family Comments/goals: eager to work with therapy. Daughter present encouraging pt  Pain/Comfort:  · Pain Rating 1: (did not rate)  · Location - Side 1: Left  · Location 1: hip  · Pain Addressed 1: Reposition, Distraction, Cessation of Activity, Nurse notified      Objective:     Communicated with nurse Kruse prior to session.  Patient found supine with oxygen, telemetry, hip abduction brace, bed alarm([employer])  upon PT entry to room.     General Precautions: Standard, fall, hearing impaired   Orthopedic Precautions:LLE weight bearing as tolerated, LLE posterior precautions   Braces: Hip abduction brace     Functional Mobility:  · Bed Mobility:     · Scooting: moderate assistance  · Supine to Sit: maximal  assistance    · Transfers:     · Sit to Stand:  moderate assistance with rolling walker  · Bed to Chair: moderate assistance with  rolling walker  using  Stand Pivot    · Gait: 15' with mod A using RW, O2 and hip abd brace donned         Therapeutic Activities and Exercises:   pt assisted to chair with mod A     Patient left up in chair with all lines intact, call button in reach, chair alarm on, nurse notified and daughter present..    GOALS:   Multidisciplinary Problems     Physical Therapy Goals        Problem: Physical Therapy Goal    Goal Priority Disciplines Outcome Goal Variances Interventions   Physical Therapy Goal     PT, PT/OT Ongoing (interventions implemented as appropriate)     Description:  Goals to be met by: 2019     Patient will increase functional independence with mobility by performin). Supine to sit with Stand-by Assistance  2). Sit to supine with Stand-by Assistance  3). Sit to stand transfer with Contact Guard Assistance  4). Bed to chair transfer with Contact Guard Assistance using Rolling Walker  5). Gait  x  > 50 feet with Contact Guard Assistance using Rolling Walker.                       Time Tracking:     PT Received On: 18  PT Start Time: 911     PT Stop Time: 933  PT Total Time (min): 22 min     Billable Minutes: Gait Training 10 and Therapeutic Activity 10    Treatment Type: Treatment  PT/PTA: PTA     PTA Visit Number: 3     Indira Dougherty, PTA  2018

## 2018-12-29 NOTE — SIGNIFICANT EVENT
RRT called due to alteration in consciousness associated with new facial droop and slurred speech.  Mild hypotension but otherwise stable vitals and glucose.  Neuro present and requested CTA head and neck and CT head.  Patient already anticoagulated. Will continue to monitor.

## 2018-12-29 NOTE — PLAN OF CARE
Problem: Adult Inpatient Plan of Care  Goal: Plan of Care Review  Outcome: Revised  Pt is AAOx2, to person and place, reoriented throughout shift.  AVASYS in use.  Pt c/o pain, prn medication given, pt tolerated well.  IV saline locked, no redness or swelling at site.  Dressing to L hip CDI.  Family remains at bedside.  VSS, in NAD, pt remains afebrile.  Pt remains free from injury.  Bed in low position, wheels locked, call light within reach.  Pt verbalized understanding of POC.  Will continue to monitor.

## 2018-12-29 NOTE — PROGRESS NOTES
Progress Note  Hospital Medicine  Patient Name:Adrian Osorio  MRN:  [id number]  Patient Class: IP- Inpatient  Admit Date: 12/21/2018  Length of Stay: 8 days  Expected Discharge Date:   Attending Physician: Pascale Nash MD  Primary Care Provider:  Martín Perales MD    SUBJECTIVE:     Principal Problem: Hip dislocation, left, initial encounter  Initial history of present illness: Mr. Osorio is an 84yo M with a PMH of Dementia, HTN, CAD/CABG 2008, CHF, ICMO with EF 35%. He presents to the ED with c/o left hip pain. Patient had a left hip arthroplasty secondary to a femoral neck fracture in October 2018. He was evaluated on 12/17/2018 and yesterday for left hip dislocation. The dislocation was reduced in the ED and he was placed in a abduction brace. The plan was to have a repair for instability done after [place].  He returns today with again L hip dislocation.  He is admitted to the service of hospital medicine and will be evaluated by the orthopedic surgeon.  Pt is a poor historian; information is obtained from ED and past medical records.    PMH/PSH/SH/FH/Meds: reviewed.    Symptoms/Review of Systems: s/p left hip arthroplasty revision. Patient is very [place]. Seen and examined with no acute events overnight. Feeling better.    Diet:  cardiac  Activity level: Up with assistance  Pain:  Patient reports no pain.       OBJECTIVE:   Vital Signs (Most Recent):      Temp: 98.2 °F (36.8 °C) (12/29/18 1156)  Pulse: 85 (12/29/18 1156)  Resp: 18 (12/29/18 1156)  BP: (!) 113/58 (12/29/18 1156)  SpO2: 95 % (12/29/18 1156)       Vital Signs Range (Last 24H):  Temp:  [96 °F (35.6 °C)-98.5 °F (36.9 °C)]   Pulse:  [70-85]   Resp:  [14-20]   BP: (113-169)/(58-83)   SpO2:  [95 %-97 %]     Weight: 94.3 kg (207 lb 14.3 oz)  Body mass index is 28.2 kg/m².    Intake/Output Summary (Last 24 hours) at 12/29/2018 1258  Last data filed at 12/29/2018 0600  Gross per 24 hour   Intake 720 ml   Output 250 ml   Net 470 ml      Physical Examination:  Constitutional: He appears well-developed and well-nourished.   HENT:   Head: Normocephalic and atraumatic.   Eyes: Conjunctivae and EOM are normal. Pupils are equal, round, and reactive to light.   Neck: Normal range of motion. Neck supple. No JVD present.   Cardiovascular: Normal rate, regular rhythm, normal heart sounds and intact distal pulses.   Pulmonary/Chest: Effort normal and breath sounds normal. No stridor. No respiratory distress.   Abdominal: Soft. Bowel sounds are normal. He exhibits no distension. There is no tenderness.   Skin: Skin is warm and dry. Capillary refill takes 2 to 3 seconds.   Psychiatric: He has a normal mood and affect. His behavior is normal. Judgment normal.   Ext: Left hip dressing C/D/I. Hip brace in place.  Vitals reviewed.  CRANIAL NERVES   CN III, IV, VI   Pupils are equal, round, and reactive to light.  Extraocular motions are normal.      CBC:  Recent Labs   Lab 12/27/18  0617 12/28/18  0538 12/29/18  0600   WBC 6.20 7.50 6.90   RBC 3.09* 3.01* 3.08*   HGB 9.1* 8.8* 9.1*   HCT 28.0* 26.8* 27.4*    324 357*   MCV 91 89 89   MCH 29.6 29.4 29.5   MCHC 32.6 33.0 33.1   BMP  Recent Labs   Lab 12/27/18  0617 12/28/18  0538 12/29/18  0600   GLU 87 97 95    138 138   K 3.3* 3.5 3.8    106 106   CO2 25 24 26   BUN 13 12 12   CREATININE 0.8 0.8 0.8   CALCIUM 7.8* 8.0* 8.3*   MG 1.7 1.7 1.8      Diagnostic Results:  Microbiology Results (last 7 days)     Procedure Component Value Units Date/Time    Culture, Anaerobic [[id number]] Collected:  12/24/18 1235    Order Status:  Completed Specimen:  Body Fluid from Hip, Left Updated:  12/28/18 0943     Anaerobic Culture Culture in progress    Aerobic culture [[id number]] Collected:  12/24/18 1235    Order Status:  Completed Specimen:  Body Fluid from Hip, Left Updated:  12/27/18 0933     Aerobic Bacterial Culture No growth         Left hip x-ray: Left hip dislocation    Left hip x-ray: Interval  reduction of the superolateral dislocation.    ECHO:  · Moderate left ventricular enlargement.  · Decreased left ventricular systolic function. The estimated ejection fraction is 18%  · Global hypokinetic wall motion.  · Grade I (mild) left ventricular diastolic dysfunction consistent with impaired relaxation.  · Normal left atrial pressure.  · Severe left atrial enlargement.  · Mild right atrial enlargement.  · Mild aortic regurgitation.  · Mild-to-moderate mitral regurgitation.  · Indeterminate central venous pressure. Estimated PA pressure is at least 21.16 mmHg.    Assessment/Plan:     * s/p left total hip revision arthroplasty  Hip dislocation, left, initial encounter     Continue to follow Orthopedic recommendations.  Needs aggressive incentive spirometry.  Follow hemoglobin and hematocrit closely.  Pain control with PO narcotics and antiemetics as needed.  Physical therapy as per Orthopedics protocol with fall precautions.     Essential hypertension     Chronic/Controlled. Continue chronic medications, adjusting as needed.      Acute on chronic systolic heart failure/ ICMO   PAF     Stable.  Marked reduction in EF noted from prior ECHO. Follow Dr. Le's recommendations. May need AICD.  On Lasix and Lipitor  Increase Coreg 6.25 mg PO BID. Continue ARB.      Alzheimer's dementia without behavioral disturbance     Stable. Continue Namenda, Aricept, and Seroquel.  Delirium precautions.  Fall precautions     Moderate malnutrition  Nutrition consulted. Encourage maximal PO intake. Diet supplementation ordered per nutrition approval. Will encourage PO and monitor closely for weight changes.    Acute surgical blood loss anemia  Follow CBC and transfuse as needed.    Hypokalemia  Replete KCl. Follow BMP. Start maintenance potassium supplementation.    SNF placement pending.     VTE Risk Mitigation (From admission, onward)        Ordered     apixaban tablet 5 mg  2 times daily      12/28/18 4385        Fady Mortensen  MD  Department of Hospital Medicine   [facility]

## 2018-12-29 NOTE — SIGNIFICANT EVENT
RRT called around 2 pm so I went to see patient-Nursing reports -new left facial droop. Hypoxia, bp was low, pulse weak and thready.  Had reported  afib a few days ago.   Son reports he was complainng of wanting to get up to the bathroom and then became unresponsive. No chest pain or sob was reported.  He was noted to be hypoxic. Oxygen was placed. He was transferred from chair to bed.  Then became more responsive -followed commands and left facial droop improved and he was interactive. Denied pain   Heent-perrla, did not follow eom.   Neck -supplem no JVD.   Lungs-decreased breathsounds. -no wheeze  Cor -RRR no murmur   Abd-thin, soft  Left hip-in immobilizer   gu -nl male, no berman   Ext -no edema/calf tenderness    Bmp, trop, d dimer, cxr, ekg anc CT head ordered-pending. And Dr Mortensen -hospitalist on for day arrived fo take over.   Neurology conulted-and arrived after pt was more alert.       Time spent seeing patient( greater than 1/2 spent in direct contact) : 25 min

## 2018-12-30 LAB
ALBUMIN SERPL BCP-MCNC: 2.1 G/DL
ALP SERPL-CCNC: 75 U/L
ALT SERPL W/O P-5'-P-CCNC: 13 U/L
ANION GAP SERPL CALC-SCNC: 6 MMOL/L
AST SERPL-CCNC: 18 U/L
BASOPHILS # BLD AUTO: 0 K/UL
BASOPHILS NFR BLD: 0.7 %
BILIRUB SERPL-MCNC: 0.4 MG/DL
BUN SERPL-MCNC: 18 MG/DL
CALCIUM SERPL-MCNC: 8.1 MG/DL
CHLORIDE SERPL-SCNC: 109 MMOL/L
CO2 SERPL-SCNC: 27 MMOL/L
CREAT SERPL-MCNC: 0.9 MG/DL
DIFFERENTIAL METHOD: ABNORMAL
EOSINOPHIL # BLD AUTO: 0.4 K/UL
EOSINOPHIL NFR BLD: 7.1 %
ERYTHROCYTE [DISTWIDTH] IN BLOOD BY AUTOMATED COUNT: 15.7 %
EST. GFR  (AFRICAN AMERICAN): >60 ML/MIN/1.73 M^2
EST. GFR  (NON AFRICAN AMERICAN): >60 ML/MIN/1.73 M^2
GLUCOSE SERPL-MCNC: 85 MG/DL
HCT VFR BLD AUTO: 27 %
HGB BLD-MCNC: 8.8 G/DL
LYMPHOCYTES # BLD AUTO: 1.2 K/UL
LYMPHOCYTES NFR BLD: 19.1 %
MAGNESIUM SERPL-MCNC: 2 MG/DL
MCH RBC QN AUTO: 29.5 PG
MCHC RBC AUTO-ENTMCNC: 32.7 G/DL
MCV RBC AUTO: 90 FL
MONOCYTES # BLD AUTO: 0.5 K/UL
MONOCYTES NFR BLD: 8.5 %
NEUTROPHILS # BLD AUTO: 4 K/UL
NEUTROPHILS NFR BLD: 64.6 %
PLATELET # BLD AUTO: 330 K/UL
PMV BLD AUTO: 6.9 FL
POTASSIUM SERPL-SCNC: 4.2 MMOL/L
PROT SERPL-MCNC: 5.6 G/DL
RBC # BLD AUTO: 2.99 M/UL
SODIUM SERPL-SCNC: 142 MMOL/L
WBC # BLD AUTO: 6.2 K/UL

## 2018-12-30 PROCEDURE — 85025 COMPLETE CBC W/AUTO DIFF WBC: CPT

## 2018-12-30 PROCEDURE — 25000003 PHARM REV CODE 250: Performed by: INTERNAL MEDICINE

## 2018-12-30 PROCEDURE — 63600175 PHARM REV CODE 636 W HCPCS: Performed by: INTERNAL MEDICINE

## 2018-12-30 PROCEDURE — 83735 ASSAY OF MAGNESIUM: CPT

## 2018-12-30 PROCEDURE — 27000221 HC OXYGEN, UP TO 24 HOURS

## 2018-12-30 PROCEDURE — 12000002 HC ACUTE/MED SURGE SEMI-PRIVATE ROOM

## 2018-12-30 PROCEDURE — 94761 N-INVAS EAR/PLS OXIMETRY MLT: CPT

## 2018-12-30 PROCEDURE — S0179 MEGESTROL 20 MG: HCPCS | Performed by: INTERNAL MEDICINE

## 2018-12-30 PROCEDURE — 80053 COMPREHEN METABOLIC PANEL: CPT

## 2018-12-30 PROCEDURE — 36415 COLL VENOUS BLD VENIPUNCTURE: CPT

## 2018-12-30 PROCEDURE — 25000003 PHARM REV CODE 250: Performed by: ORTHOPAEDIC SURGERY

## 2018-12-30 RX ORDER — POLYETHYLENE GLYCOL 3350 17 G/17G
17 POWDER, FOR SOLUTION ORAL 3 TIMES DAILY
Status: DISCONTINUED | OUTPATIENT
Start: 2018-12-30 | End: 2019-01-02 | Stop reason: HOSPADM

## 2018-12-30 RX ADMIN — POTASSIUM CHLORIDE 10 MEQ: 750 TABLET, EXTENDED RELEASE ORAL at 08:12

## 2018-12-30 RX ADMIN — POLYETHYLENE GLYCOL 3350 17 G: 17 POWDER, FOR SOLUTION ORAL at 08:12

## 2018-12-30 RX ADMIN — APIXABAN 5 MG: 2.5 TABLET, FILM COATED ORAL at 08:12

## 2018-12-30 RX ADMIN — CARVEDILOL 3.12 MG: 3.12 TABLET, FILM COATED ORAL at 08:12

## 2018-12-30 RX ADMIN — CARVEDILOL 3.12 MG: 3.12 TABLET, FILM COATED ORAL at 04:12

## 2018-12-30 RX ADMIN — FAMOTIDINE 20 MG: 20 TABLET ORAL at 08:12

## 2018-12-30 RX ADMIN — MEGESTROL ACETATE 200 MG: 40 SUSPENSION ORAL at 08:12

## 2018-12-30 RX ADMIN — FUROSEMIDE 20 MG: 10 INJECTION, SOLUTION INTRAVENOUS at 08:12

## 2018-12-30 RX ADMIN — POLYETHYLENE GLYCOL 3350 17 G: 17 POWDER, FOR SOLUTION ORAL at 03:12

## 2018-12-30 RX ADMIN — ASPIRIN 81 MG CHEWABLE TABLET 81 MG: 81 TABLET CHEWABLE at 08:12

## 2018-12-30 RX ADMIN — HYDROCODONE BITARTRATE AND ACETAMINOPHEN 1 TABLET: 5; 325 TABLET ORAL at 11:12

## 2018-12-30 RX ADMIN — ATORVASTATIN CALCIUM 40 MG: 40 TABLET, FILM COATED ORAL at 08:12

## 2018-12-30 NOTE — PROGRESS NOTES
I was called acutely at around 3 pm for patient with acute dysarthria and left facial droop, LSN at around 2 pm.  Exam: Left facial droop, No hemiparesis, moderate dysathria.  Impression:  Clinical stroke. NIHSS 3.  CT   CTA head stat  Patient with h/o of Afib on full anticoagulation  CT negative acute, CTA no LVO.  Contraindication for tPA: Recent surgery - hip, and anticuoagulation.  Obtain stroke work up. Echo/MRI brain non con.  PT/OT/ST  Q2hrs neurochecks.  Family and Primary MD updated. (I personally updated the son)  CC 45 mins.

## 2018-12-30 NOTE — PLAN OF CARE
Problem: Adult Inpatient Plan of Care  Goal: Plan of Care Review  Outcome: Ongoing (interventions implemented as appropriate)  Plan of care reviewed, patient needs reinforcement with understanding. Alert to self/place, disoriented to time and situation. Urinal at bedside.  PIV infusing per order. Dressing to L hip, CDI. Immobilizer on L leg at all times per orders. VS addresssed. O2@2L NC. Tele monitored, NSR. Neurovascular checks Q4H maintined. SEDS in place. Wt. Bearing as tolerated. PT/OT. Telesitter at bedside. Remain afebrile throughout shift. Moderate pain control with PRN meds. Imelda lax given per orders for patient to have BM. Family at bedside at all times. [place]. Bed in lowest position, SR x2, brakes locked, call light within reach. Patient remains free from fall and injury, no new skin breakdown noted. Will continue to monitor.

## 2018-12-30 NOTE — PLAN OF CARE
Problem: Adult Inpatient Plan of Care  Goal: Plan of Care Review  Outcome: Revised  Pt is AAOx1 to self only, reoriented throughout shift.  IV saline locked, no redness or swelling at site.  AVASYS in use.  Family remains at bedside.  Cardiac monitoring in place, NSR.  Dressing to L hip CDI, immobolizer ion use.    Neuro checks Q 4 hr.  VSS, in NAD, pt remains afebrile.  Pt remains free from injury.  Bed in low position, wheels locked, call light within reach.  Pt/ family verbalized understanding of POC.  Will continue to monitor.

## 2018-12-30 NOTE — PROGRESS NOTES
[facility]  Neurology  Consult Note    Patient Name: Adrian Osorio  MRN: [id number]  Admission Date: 12/21/2018  Hospital Length of Stay: 10 days  Code Status: Prior   Attending Provider: Pascale Nash MD   Consulting Provider: MERCEDES Fernandez  Primary Care Physician: Martín Perales MD  Principal Problem:Hip dislocation, left, initial encounter    Consults  Subjective:     Chief Complaint: stroke     HPI:called acutely yesterday around 3 pm for patient with acute dysarthria and left facial droop, LSN at around 2 pm.  Exam: Left facial droop, No hemiparesis, moderate dysathria.  Impression:  Clinical stroke. NIHSS 3.  Patient with h/o of Afib on full anticoagulation  CT negative acute, CTA no LVO.  Contraindication for tPA: Recent surgery - hip, and anticuoagulation.    CTA H/N: developmental anomalies, no acute findings.Old right MCA stroke.   CT: no acute findings.   MRI: Pending  TTE: EF 18%    Patient looks much better and seen with n      Past Medical History:   Diagnosis Date    CHF (congestive heart failure)     Coronary artery disease     Hypertension        Past Surgical History:   Procedure Laterality Date    CARDIAC SURGERY      cabg    HEMIARTHROPLASTY, HIP Left 10/29/2018    Performed by [facility] at [facility] OR    REVISION, TOTAL ARTHROPLASTY, HIP Left 12/24/2018    Performed by [facility]es [facility] at [facility] OR       Review of patient's allergies indicates:  No Known Allergies    Current Neurological Medications: n/a    No current facility-administered medications on file prior to encounter.      Current Outpatient Medications on File Prior to Encounter   Medication Sig    amLODIPine (NORVASC) 5 MG tablet Take 5 mg by mouth 2 (two) times daily.    aspirin 325 MG tablet Take 1 tablet (325 mg total) by mouth 2 (two) times daily. for 21 days    carvedilol (COREG) 6.25 MG tablet Take 6.25 mg by mouth 2 (two) times daily with meals.      donepezil (ARICEPT) 5  MG tablet Take 5 mg by mouth every evening.    ergocalciferol (ERGOCALCIFEROL) 50,000 unit Cap Take 50,000 Units by mouth every 14 (fourteen) days.    memantine (NAMENDA) 10 MG tablet Take 10 mg by mouth 2 (two) times daily.      potassium chloride SA (K-DUR,KLOR-CON) 20 MEQ tablet Take 20 mEq by mouth once daily.     QUEtiapine (SEROQUEL) 25 MG Tab Take 25 mg by mouth 2 (two) times daily. Take 1-2 tablets by mouth 2 times daily    tamsulosin (FLOMAX) 0.4 mg Cap Take 0.4 mg by mouth once daily.    valsartan (DIOVAN) 160 MG tablet Take 160 mg by mouth once daily.      Family History     Problem Relation (Age of Onset)    Heart attack Sister    Heart disease Mother, Father    Hypertension Brother    Stroke Brother        Tobacco Use    Smoking status: Never Smoker    Smokeless tobacco: Never Used   Substance and Sexual Activity    Alcohol use: No    Drug use: No    Sexual activity: Not Currently     Review of Systems  Objective:     Vital Signs (Most Recent):  Temp: 99.3 °F (37.4 °C) (12/30/18 2333)  Pulse: 76 (12/30/18 2333)  Resp: 18 (12/30/18 2333)  BP: 137/62 (12/30/18 2333)  SpO2: (!) 93 % (12/30/18 2333) Vital Signs (24h Range):  Temp:  [96.8 °F (36 °C)-99.3 °F (37.4 °C)] 99.3 °F (37.4 °C)  Pulse:  [65-76] 76  Resp:  [16-18] 18  SpO2:  [93 %-99 %] 93 %  BP: (117-142)/(59-64) 137/62     Weight: 94.3 kg (207 lb 14.3 oz)  Body mass index is 28.2 kg/m².    Physical Exam   Constitutional: He appears well-developed and well-nourished.   HENT:   Head: Normocephalic and atraumatic.   Eyes: Pupils are equal, round, and reactive to light.   Neck: Normal range of motion.   Musculoskeletal: Normal range of motion.   Neurological:   Dysarthria, left facial droop.   Otherwise WNL.   Skin: Skin is warm and dry.       NEUROLOGICAL EXAMINATION:     CRANIAL NERVES     CN III, IV, VI   Pupils are equal, round, and reactive to light.      Significant Labs:   Hemoglobin A1c: No results for input(s): HGBA1C in the last 720  hours.  CBC:   Recent Labs   Lab 12/29/18  0600 12/30/18  0602   WBC 6.90 6.20   HGB 9.1* 8.8*   HCT 27.4* 27.0*   * 330     CMP:   Recent Labs   Lab 12/29/18  0600 12/30/18  0602   GLU 95 85    142   K 3.8 4.2    109   CO2 26 27   BUN 12 18   CREATININE 0.8 0.9   CALCIUM 8.3* 8.1*   MG 1.8 2.0   PROT 5.7* 5.6*   ALBUMIN 2.1* 2.1*   BILITOT 0.4 0.4   ALKPHOS 78 75   AST 21 18   ALT 17 13   ANIONGAP 6* 6*   EGFRNONAA >60 >60       Significant Imaging: I have reviewed and interpreted all pertinent imaging results/findings within the past 24 hours.    Assessment and Plan:     Active Diagnoses:    Diagnosis Date Noted POA    PRINCIPAL PROBLEM:  Hip dislocation, left, initial encounter [S73.005A] 12/21/2018 Yes    Moderate malnutrition [E44.0] 12/27/2018 Yes    Alzheimer's dementia without behavioral disturbance [G30.9, F02.80] 10/28/2018 Yes    Chronic systolic heart failure/ ICMO EF 40% [I50.22] 10/28/2018 Yes    Essential hypertension [I10] 10/28/2018 Yes      Problems Resolved During this Admission:       VTE Risk Mitigation (From admission, onward)        Ordered     apixaban tablet 5 mg  2 times daily      12/28/18 1533      Right MCA stroke - not tPA candidate due to recent surgery and on blood thinners  Chronic R MCA CVA  CT head w/o acute findings. NO MRI due to pacemaker.   Neurologically improved  A-fib on Eliquis and ASA.   PT/OT/ST  EF 18%  Resume dementia meds          Thank you for your consult. I will follow-up with patient. Please contact us if you have any additional questions.    MERCEDES Fernandez  Neurology  [facility]    I, Dr. Shaquille Patel, have personally seen and examined the patient with my advanced provider and agree with above. I personally did a focused exam, and reviewed all necessary clinical information. I discussed my management plan with my NP and agree with above.

## 2018-12-30 NOTE — PLAN OF CARE
12/30/18 0850   Patient Assessment/Suction   Level of Consciousness (AVPU) responds to voice   PRE-TX-O2-ETCO2   O2 Device (Oxygen Therapy) nasal cannula   $ Is the patient on Low Flow Oxygen? Yes   Flow (L/min) 2   SpO2 98 %   Pulse Oximetry Type Intermittent   $ Pulse Oximetry - Multiple Charge Pulse Oximetry - Multiple   Pulse 70   Resp 18

## 2018-12-30 NOTE — PROGRESS NOTES
Progress Note  Hospital Medicine  Patient Name:Adrian Osorio  MRN:  [id number]  Patient Class: IP- Inpatient  Admit Date: 12/21/2018  Length of Stay: 9 days  Expected Discharge Date:   Attending Physician: Pascale Nash MD  Primary Care Provider:  Martín Perales MD    SUBJECTIVE:     Principal Problem: Hip dislocation, left, initial encounter  Initial history of present illness: Mr. 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 He presents to the ED with c/o left hip pain. Patient had a left hip arthroplasty secondary to a femoral neck fracture in October 2018. He was evaluated on 12/17/2018 and yesterday for left hip dislocation. The dislocation was reduced in the ED and he was placed in a abduction brace. The plan was to have a repair for instability done after Zeny.  He returns today with again L hip dislocation.  He is admitted to the service of hospital medicine and will be evaluated by the orthopedic surgeon.  Pt is a poor historian; information is obtained from ED and past medical records.    PMH/PSH/SH/FH/Meds: reviewed.    Symptoms/Review of Systems: s/p left hip arthroplasty revision. Seen and examined. No further acute events after yesterday. Doing well this morning.    Diet:  cardiac  Activity level: Up with assistance  Pain:  Patient reports no pain.       OBJECTIVE:   Vital Signs (Most Recent):      Temp: 96.8 °F (36 °C) (12/30/18 1101)  Pulse: 65 (12/30/18 1101)  Resp: 18 (12/30/18 1101)  BP: (!) 142/63 (12/30/18 1101)  SpO2: 96 % (12/30/18 1101)       Vital Signs Range (Last 24H):  Temp:  [96.1 °F (35.6 °C)-98.2 °F (36.8 °C)]   Pulse:  [62-85]   Resp:  [18-20]   BP: ()/(53-71)   SpO2:  [81 %-100 %]     Weight: 94.3 kg (207 lb 14.3 oz)  Body mass index is 28.2 kg/m².    Intake/Output Summary (Last 24 hours) at 12/30/2018 1153  Last data filed at 12/30/2018 0600  Gross per 24 hour   Intake 1160 ml   Output --   Net 1160 ml      Physical Examination:  Constitutional: He appears well-developed and well-nourished.   HENT:   Head: Normocephalic and atraumatic.   Eyes: Conjunctivae and EOM are normal. Pupils are equal, round, and reactive to light.   Neck: Normal range of motion. Neck supple. No JVD present.   Cardiovascular: Normal rate, regular rhythm, normal heart sounds and intact distal pulses.   Pulmonary/Chest: Effort normal and breath sounds normal. No stridor. No respiratory distress.   Abdominal: Soft. Bowel sounds are normal. He exhibits no distension. There is no tenderness.   Skin: Skin is warm and dry. Capillary refill takes 2 to 3 seconds.   Psychiatric: He has a normal mood and affect. His behavior is normal. Judgment normal.   Ext: Left hip dressing C/D/I. Hip brace in place.  Vitals reviewed.  CRANIAL NERVES   CN III, IV, VI   Pupils are equal, round, and reactive to light.  Extraocular motions are normal.      CBC:  Recent Labs   Lab 12/28/18  0538 12/29/18  0600 12/30/18  0602   WBC 7.50 6.90 6.20   RBC 3.01* 3.08* 2.99*   HGB 8.8* 9.1* 8.8*   HCT 26.8* 27.4* 27.0*    357* 330   MCV 89 89 90   MCH 29.4 29.5 29.5   MCHC 33.0 33.1 32.7   BMP  Recent Labs   Lab 12/28/18  0538 12/29/18  0600 12/30/18  0602   GLU 97 95 85    138 142   K 3.5 3.8 4.2    106 109   CO2 24 26 27   BUN 12 12 18   CREATININE 0.8 0.8 0.9   CALCIUM 8.0* 8.3* 8.1*   MG 1.7 1.8 2.0      Diagnostic Results:  Microbiology Results (last 7 days)     Procedure Component Value Units Date/Time    Culture, Anaerobic [[id number]] Collected:  12/24/18 1235    Order Status:  Completed Specimen:  Body Fluid from Hip, Left Updated:  12/28/18 0943     Anaerobic Culture Culture in progress    Aerobic culture [[id number]] Collected:  12/24/18 1235    Order Status:  Completed Specimen:  Body Fluid from Hip, Left Updated:  12/27/18 0933     Aerobic Bacterial Culture No growth         Left hip x-ray: Left hip dislocation    Left hip x-ray: Interval  reduction of the superolateral dislocation.    ECHO:  · Moderate left ventricular enlargement.  · Decreased left ventricular systolic function. The estimated ejection fraction is 18%  · Global hypokinetic wall motion.  · Grade I (mild) left ventricular diastolic dysfunction consistent with impaired relaxation.  · Normal left atrial pressure.  · Severe left atrial enlargement.  · Mild right atrial enlargement.  · Mild aortic regurgitation.  · Mild-to-moderate mitral regurgitation.  · Indeterminate central venous pressure. Estimated PA pressure is at least 21.16 mmHg.    Assessment/Plan:     * s/p left total hip revision arthroplasty  Hip dislocation, left, initial encounter     Continue to follow Orthopedic recommendations.  Needs aggressive incentive spirometry.  Follow hemoglobin and hematocrit closely.  Pain control with PO narcotics and antiemetics as needed.  Physical therapy as per Orthopedics protocol with fall precautions.     Essential hypertension     Chronic/Controlled. Continue chronic medications, adjusting as needed.      Acute on chronic systolic heart failure/ ICMO   PAF     Stable.  Marked reduction in EF noted from prior ECHO. Follow Dr. Le's recommendations. May need AICD.  On Lasix and Lipitor  Continue Coreg 3.125      Alzheimer's dementia without behavioral disturbance     Stable. Continue Namenda, Aricept, and Seroquel.  Delirium precautions.  Fall precautions     Moderate malnutrition  Nutrition consulted. Encourage maximal PO intake. Diet supplementation ordered per nutrition approval. Will encourage PO and monitor closely for weight changes.    Acute surgical blood loss anemia  Follow CBC and transfuse as needed.    Hypokalemia  Replete KCl. Follow BMP. Start maintenance potassium supplementation.    Altered mental Status  Suspect mild CVA.  Neuro on board  CT with no bleed or acute findings  +/- MRI will discuss with neuro  Continue current medications and appreciate neuro  recommendations    SNF placement pending.     VTE Risk Mitigation (From admission, onward)        Ordered     apixaban tablet 5 mg  2 times daily      12/28/18 3749        Fady Mortensen MD  Department of Hospital Medicine   [facility]

## 2018-12-31 PROBLEM — Z86.73 CHRONIC ISCHEMIC RIGHT MCA STROKE: Status: ACTIVE | Noted: 2018-12-31

## 2018-12-31 LAB
ALBUMIN SERPL BCP-MCNC: 2.1 G/DL
ALP SERPL-CCNC: 76 U/L
ALT SERPL W/O P-5'-P-CCNC: 16 U/L
ANION GAP SERPL CALC-SCNC: 8 MMOL/L
AST SERPL-CCNC: 20 U/L
BACTERIA SPEC ANAEROBE CULT: NORMAL
BASOPHILS # BLD AUTO: 0 K/UL
BASOPHILS NFR BLD: 0.7 %
BILIRUB SERPL-MCNC: 0.4 MG/DL
BUN SERPL-MCNC: 21 MG/DL
CALCIUM SERPL-MCNC: 8.1 MG/DL
CHLORIDE SERPL-SCNC: 108 MMOL/L
CO2 SERPL-SCNC: 24 MMOL/L
CREAT SERPL-MCNC: 1.2 MG/DL
DIFFERENTIAL METHOD: ABNORMAL
EOSINOPHIL # BLD AUTO: 0.5 K/UL
EOSINOPHIL NFR BLD: 7.9 %
ERYTHROCYTE [DISTWIDTH] IN BLOOD BY AUTOMATED COUNT: 15.5 %
EST. GFR  (AFRICAN AMERICAN): >60 ML/MIN/1.73 M^2
EST. GFR  (NON AFRICAN AMERICAN): 55 ML/MIN/1.73 M^2
GLUCOSE SERPL-MCNC: 91 MG/DL
HCT VFR BLD AUTO: 25.6 %
HGB BLD-MCNC: 8.4 G/DL
LYMPHOCYTES # BLD AUTO: 1.4 K/UL
LYMPHOCYTES NFR BLD: 20.9 %
MAGNESIUM SERPL-MCNC: 2 MG/DL
MCH RBC QN AUTO: 29.5 PG
MCHC RBC AUTO-ENTMCNC: 32.7 G/DL
MCV RBC AUTO: 90 FL
MONOCYTES # BLD AUTO: 0.6 K/UL
MONOCYTES NFR BLD: 9.1 %
NEUTROPHILS # BLD AUTO: 4.1 K/UL
NEUTROPHILS NFR BLD: 61.4 %
PLATELET # BLD AUTO: 344 K/UL
PMV BLD AUTO: 6.9 FL
POTASSIUM SERPL-SCNC: 4 MMOL/L
PROT SERPL-MCNC: 5.3 G/DL
RBC # BLD AUTO: 2.84 M/UL
SODIUM SERPL-SCNC: 140 MMOL/L
WBC # BLD AUTO: 6.7 K/UL

## 2018-12-31 PROCEDURE — 12000002 HC ACUTE/MED SURGE SEMI-PRIVATE ROOM

## 2018-12-31 PROCEDURE — 36415 COLL VENOUS BLD VENIPUNCTURE: CPT

## 2018-12-31 PROCEDURE — 97116 GAIT TRAINING THERAPY: CPT

## 2018-12-31 PROCEDURE — 25000003 PHARM REV CODE 250: Performed by: INTERNAL MEDICINE

## 2018-12-31 PROCEDURE — 85025 COMPLETE CBC W/AUTO DIFF WBC: CPT

## 2018-12-31 PROCEDURE — 94761 N-INVAS EAR/PLS OXIMETRY MLT: CPT

## 2018-12-31 PROCEDURE — 99232 SBSQ HOSP IP/OBS MODERATE 35: CPT | Mod: ,,, | Performed by: INTERNAL MEDICINE

## 2018-12-31 PROCEDURE — 80053 COMPREHEN METABOLIC PANEL: CPT

## 2018-12-31 PROCEDURE — S0179 MEGESTROL 20 MG: HCPCS | Performed by: INTERNAL MEDICINE

## 2018-12-31 PROCEDURE — 63600175 PHARM REV CODE 636 W HCPCS: Performed by: INTERNAL MEDICINE

## 2018-12-31 PROCEDURE — 27000221 HC OXYGEN, UP TO 24 HOURS

## 2018-12-31 PROCEDURE — 83735 ASSAY OF MAGNESIUM: CPT

## 2018-12-31 PROCEDURE — 25000003 PHARM REV CODE 250: Performed by: ORTHOPAEDIC SURGERY

## 2018-12-31 PROCEDURE — 99232 PR SUBSEQUENT HOSPITAL CARE,LEVL II: ICD-10-PCS | Mod: ,,, | Performed by: INTERNAL MEDICINE

## 2018-12-31 RX ADMIN — CARVEDILOL 3.12 MG: 3.12 TABLET, FILM COATED ORAL at 09:12

## 2018-12-31 RX ADMIN — POTASSIUM CHLORIDE 10 MEQ: 750 TABLET, EXTENDED RELEASE ORAL at 09:12

## 2018-12-31 RX ADMIN — POLYETHYLENE GLYCOL 3350 17 G: 17 POWDER, FOR SOLUTION ORAL at 08:12

## 2018-12-31 RX ADMIN — POLYETHYLENE GLYCOL 3350 17 G: 17 POWDER, FOR SOLUTION ORAL at 03:12

## 2018-12-31 RX ADMIN — CARVEDILOL 3.12 MG: 3.12 TABLET, FILM COATED ORAL at 04:12

## 2018-12-31 RX ADMIN — ASPIRIN 81 MG CHEWABLE TABLET 81 MG: 81 TABLET CHEWABLE at 09:12

## 2018-12-31 RX ADMIN — FUROSEMIDE 20 MG: 10 INJECTION, SOLUTION INTRAVENOUS at 10:12

## 2018-12-31 RX ADMIN — HYDROCODONE BITARTRATE AND ACETAMINOPHEN 1 TABLET: 5; 325 TABLET ORAL at 07:12

## 2018-12-31 RX ADMIN — HYDROCODONE BITARTRATE AND ACETAMINOPHEN 1 TABLET: 5; 325 TABLET ORAL at 08:12

## 2018-12-31 RX ADMIN — ATORVASTATIN CALCIUM 40 MG: 40 TABLET, FILM COATED ORAL at 09:12

## 2018-12-31 RX ADMIN — POLYETHYLENE GLYCOL 3350 17 G: 17 POWDER, FOR SOLUTION ORAL at 09:12

## 2018-12-31 RX ADMIN — FAMOTIDINE 20 MG: 20 TABLET ORAL at 08:12

## 2018-12-31 RX ADMIN — APIXABAN 5 MG: 2.5 TABLET, FILM COATED ORAL at 08:12

## 2018-12-31 RX ADMIN — FAMOTIDINE 20 MG: 20 TABLET ORAL at 09:12

## 2018-12-31 RX ADMIN — MEGESTROL ACETATE 200 MG: 40 SUSPENSION ORAL at 09:12

## 2018-12-31 RX ADMIN — APIXABAN 5 MG: 2.5 TABLET, FILM COATED ORAL at 09:12

## 2018-12-31 NOTE — PT/OT/SLP PROGRESS
Physical Therapy      Patient Name:  Adrian Osorio   MRN:  [id number]    Patient not seen today secondary to Unavailable (Comment)(patient preparing to go for CT scan. Out of room x 2 attempts following.). Will follow-up later today..    Tammi Gonzalez, RICCI

## 2018-12-31 NOTE — PLAN OF CARE
Problem: Adult Inpatient Plan of Care  Goal: Plan of Care Review  Outcome: Ongoing (interventions implemented as appropriate)  Pt oriented to self. Plan of care reviewed with patient and patient's daughter. Patient's daughter staying at bedside. Safety maintained throughout shift. Bed in low and locked position, side rails up x2, call light within reach, bed alarm set. Avasys in use. Urinal at bedside. Telemetry monitoring maintained. O2 per NC in use. Dressing to L hip changed this shift, CDI. L hip immobilizer in place. Neurovascular checks q4. PRN medication given for c/o pain. Hourly rounding for pt safety. Will continue to monitor.

## 2018-12-31 NOTE — PLAN OF CARE
Problem: Adult Inpatient Plan of Care  Goal: Plan of Care Review  Outcome: Ongoing (interventions implemented as appropriate)  02 saturation 98% on nc at 2lpm.

## 2018-12-31 NOTE — PT/OT/SLP PROGRESS
Physical Therapy Treatment    Patient Name:  Adrian Osorio   MRN:  [id number]    Recommendations:     Discharge Recommendations:  nursing facility, skilled   Discharge Equipment Recommendations:     Barriers to discharge: None    Assessment:     Adrian Osorio is a 85 y.o. male admitted with a medical diagnosis of Hip dislocation, left, initial encounter.  He presents with the following impairments/functional limitations:  weakness, impaired endurance, impaired self care skills, impaired functional mobilty, gait instability, decreased lower extremity function, pain, orthopedic precautions, impaired cardiopulmonary response to activity .    Rehab Prognosis: Fair; patient would benefit from acute skilled PT services to address these deficits and reach maximum level of function.    Recent Surgery: Procedure(s) (LRB):  REVISION, TOTAL ARTHROPLASTY, HIP (Left) 7 Days Post-Op    Plan:     During this hospitalization, patient to be seen BID to address the identified rehab impairments via gait training, therapeutic activities, therapeutic exercises and progress toward the following goals:    · Plan of Care Expires:       Subjective     Chief Complaint: pain with movement L hip  Patient/Family Comments/goals: to return to facility  Pain/Comfort:  · Pain Rating 1: (did not rate)  · Location - Side 1: Left  · Location - Orientation 1: generalized  · Location 1: hip  · Pain Addressed 1: Reposition, Nurse notified, Cessation of Activity      Objective:     Communicated with nurse Castaneda prior to session.  Patient found supine in bed with oxygen, telemetry, hip abduction brace(Carolynn Sys at bedside with daughter )  upon PT entry to room.     General Precautions: Standard, fall, hearing impaired   Orthopedic Precautions:LLE weight bearing as tolerated, LLE posterior precautions   Braces: Hip abduction brace     Functional Mobility:  · Bed Mobility:     · Rolling Left:  contact guard assistance  · Rolling Right: contact  guard assistance  · Supine to Sit: minimum assistance  · Sit to Supine: minimum assistance  · Transfers:     · Sit to Stand:  minimum assistance with rolling walker  · Gait: 40' with rw and Min A , WBAT LLE with hip abduction brace in place and O2 attached.      AM-PAC 6 CLICK MOBILITY          Therapeutic Activities and Exercises:   Transferred to sitting EOB. Reviewed hip precautions. Ambulated in hallway with rw and A with  Hip abd brace in place, WBAT LLE. Returned to bed following with A for LE's onto bed and A to scoot to HOB.    Patient left supine with all lines intact, call button in reach, bed alarm on, nurse Tonya notified and daughter and Carolynn Sys present..    GOALS:   Multidisciplinary Problems     Physical Therapy Goals     Not on file          Multidisciplinary Problems (Resolved)        Problem: Physical Therapy Goal    Goal Priority Disciplines Outcome Goal Variances Interventions   Physical Therapy Goal   (Resolved)     PT, PT/OT Outcome(s) achieved     Description:  Goals to be met by: 2019     Patient will increase functional independence with mobility by performin). Supine to sit with Stand-by Assistance  2). Sit to supine with Stand-by Assistance  3). Sit to stand transfer with Contact Guard Assistance  4). Bed to chair transfer with Contact Guard Assistance using Rolling Walker  5). Gait  x  > 50 feet with Contact Guard Assistance using Rolling Walker.                       Time Tracking:     PT Received On: 18  PT Start Time: 1505     PT Stop Time: 1520  PT Total Time (min): 15 min     Billable Minutes: Gait Training 15min    Treatment Type: Treatment  PT/PTA: PTA     PTA Visit Number: 4     Tammi Gonzalez PTA  2018

## 2018-12-31 NOTE — PROGRESS NOTES
Progress Note  Hospital Medicine  Patient Name:Adrian Osorio  MRN:  [id number]  Patient Class: IP- Inpatient  Admit Date: 12/21/2018  Length of Stay: 10 days  Expected Discharge Date:   Attending Physician: Pascale Nash MD  Primary Care Provider:  Martín Perales MD    SUBJECTIVE:     Principal Problem: Hip dislocation, left, initial encounter  Initial history of present illness: Mr. 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 He presents to the ED with c/o left hip pain. Patient had a left hip arthroplasty secondary to a femoral neck fracture in October 2018. He was evaluated on 12/17/2018 and yesterday for left hip dislocation. The dislocation was reduced in the ED and he was placed in a abduction brace. The plan was to have a repair for instability done after [place].  He returns today with again L hip dislocation.  He is admitted to the service of hospital medicine and will be evaluated by the orthopedic surgeon.  Pt is a poor historian; information is obtained from ED and past medical records.    PMH/PSH/SH/FH/Meds: reviewed.    Symptoms/Review of Systems: s/p left hip arthroplasty revision. Seen and examined. No further acute events since weekend. No new complaints in am.  Diet:  cardiac  Activity level: Up with assistance  Pain:  Patient reports no pain.       OBJECTIVE:   Vital Signs (Most Recent):      Temp: 98.8 °F (37.1 °C) (12/31/18 0813)  Pulse: 73 (12/31/18 0813)  Resp: 18 (12/31/18 0813)  BP: 133/62 (12/31/18 0813)  SpO2: 99 % (12/31/18 0813)       Vital Signs Range (Last 24H):  Temp:  [96.8 °F (36 °C)-99.3 °F (37.4 °C)]   Pulse:  [65-76]   Resp:  [16-18]   BP: (117-142)/(59-63)   SpO2:  [93 %-99 %]     Weight: 94.3 kg (207 lb 14.3 oz)  Body mass index is 28.2 kg/m².    Intake/Output Summary (Last 24 hours) at 12/31/2018 0929  Last data filed at 12/31/2018 0600  Gross per 24 hour   Intake 465 ml   Output --   Net 465 ml     Physical  Examination:  Constitutional: He appears well-developed and well-nourished.   HENT:   Head: Normocephalic and atraumatic.   Eyes: Conjunctivae and EOM are normal. Pupils are equal, round, and reactive to light.   Neck: Normal range of motion. Neck supple. No JVD present.   Cardiovascular: Normal rate, regular rhythm, normal heart sounds and intact distal pulses.   Pulmonary/Chest: Effort normal and breath sounds normal. No stridor. No respiratory distress.   Abdominal: Soft. Bowel sounds are normal. He exhibits no distension. There is no tenderness.   Skin: Skin is warm and dry. Capillary refill takes 2 to 3 seconds.   Psychiatric: He has a normal mood and affect. His behavior is normal. Judgment normal.   Ext: Left hip dressing C/D/I. Hip brace in place.  Vitals reviewed.  CRANIAL NERVES   CN III, IV, VI   Pupils are equal, round, and reactive to light.  Extraocular motions are normal.      CBC:  Recent Labs   Lab 12/29/18  0600 12/30/18  0602 12/31/18  0611   WBC 6.90 6.20 6.70   RBC 3.08* 2.99* 2.84*   HGB 9.1* 8.8* 8.4*   HCT 27.4* 27.0* 25.6*   * 330 344   MCV 89 90 90   MCH 29.5 29.5 29.5   MCHC 33.1 32.7 32.7   BMP  Recent Labs   Lab 12/29/18  0600 12/30/18  0602 12/31/18  0611   GLU 95 85 91    142 140   K 3.8 4.2 4.0    109 108   CO2 26 27 24   BUN 12 18 21   CREATININE 0.8 0.9 1.2   CALCIUM 8.3* 8.1* 8.1*   MG 1.8 2.0 2.0      Diagnostic Results:  Microbiology Results (last 7 days)     Procedure Component Value Units Date/Time    Culture, Anaerobic [[id number]] Collected:  12/24/18 1235    Order Status:  Completed Specimen:  Body Fluid from Hip, Left Updated:  12/31/18 0713     Anaerobic Culture No anaerobes isolated    Aerobic culture [[id number]] Collected:  12/24/18 1235    Order Status:  Completed Specimen:  Body Fluid from Hip, Left Updated:  12/27/18 0933     Aerobic Bacterial Culture No growth         Left hip x-ray: Left hip dislocation    Left hip x-ray: Interval reduction of the  superolateral dislocation.    ECHO:  · Moderate left ventricular enlargement.  · Decreased left ventricular systolic function. The estimated ejection fraction is 18%  · Global hypokinetic wall motion.  · Grade I (mild) left ventricular diastolic dysfunction consistent with impaired relaxation.  · Normal left atrial pressure.  · Severe left atrial enlargement.  · Mild right atrial enlargement.  · Mild aortic regurgitation.  · Mild-to-moderate mitral regurgitation.  · Indeterminate central venous pressure. Estimated PA pressure is at least 21.16 mmHg.    CTA head and neck:  Although there is atherosclerotic plaque at the origin of the left carotid artery and both subclavian arteries and at the carotid bifurcations bilaterally significant stenosis is not demonstrated.  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  The right vertebral artery does not join the left vertebral artery to form the basilar artery which is supplied from the left vertebral artery. On this study an abrupt cut off of a trifurcation vessel on either side or peripheral AVM or mass effect or aneurysm at the [id number]-ca-[id number] is not seen..  A small old right middle cerebral artery distribution infarct is noted in the white matter of the right parietal lobe and a small lacunar infarct is seen in the left internal capsule.    MRI brain: Pending.    Assessment/Plan:     * s/p left total hip revision arthroplasty  Hip dislocation, left, initial encounter     Continue to follow Orthopedic recommendations.  Needs aggressive incentive spirometry.  Follow hemoglobin and hematocrit closely.  Pain control with PO narcotics and antiemetics as needed.  Physical therapy as per Orthopedics protocol with fall precautions.     Essential hypertension     Chronic/Controlled. Continue chronic  medications, adjusting as needed.      Acute on chronic systolic heart failure/ ICMO   PAF     Stable.  Marked reduction in EF noted from prior ECHO. Follow Dr. Le's recommendations. May need AICD.  On Lasix and Lipitor  Continue Coreg 3.125 and Eliquis use.      Alzheimer's dementia without behavioral disturbance     Stable. Continue Namenda, Aricept, and Seroquel.  Delirium precautions.  Fall precautions     Moderate malnutrition  Nutrition consulted. Encourage maximal PO intake. Diet supplementation ordered per nutrition approval. Will encourage PO and monitor closely for weight changes.    Acute surgical blood loss anemia  Follow CBC and transfuse as needed.    Hypokalemia  Follow BMP. Continue maintenance potassium supplementation.    Altered mental Status  Suspect mild CVA.  Neuro on board  CT with no bleed or acute findings. Follow MRI brain and neurology recommendations.  Continue current medications and appreciate neuro recommendations    SNF placement pending.     VTE Risk Mitigation (From admission, onward)        Ordered     apixaban tablet 5 mg  2 times daily      12/28/18 5214        Pascale Nash MD  Department of Hospital Medicine   [facility]

## 2018-12-31 NOTE — PLAN OF CARE
Waiting for pt to be medically cleared for discharge to [facility]; CT and MRI of the head pending...SUMIT Ly       12/31/18 6214   Post-Acute Status   Post-Acute Authorization Placement

## 2018-12-31 NOTE — PLAN OF CARE
Spoke with Anna at [facility] (ph#[phone]) regarding them accepting the pt SNF; she states the [employer] auth# is good until tomorrow and they CAN ACCEPT the pt if he is medically cleared.   CM can contact Anna directly to make arrangements if he is cleared for discharge tomorrow....SUMIT Ly CM      12/31/18 7087   Post-Acute Status   Post-Acute Authorization Placement

## 2018-12-31 NOTE — PLAN OF CARE
Spoke with pt's nurse, Tonya who states per Louis in MRI the pt can not have the MRI due to having a hip replacement.   I spoke with Radha in MRI who verified the pt COULD have the MRI and clarified that the pt does NOT have a pacemaker (per Dr Nash and the CXR) as documented in neuro's note.   Radha will speak with the pt's nurse and get the pt down for MRI today....SUMIT Ly CM

## 2018-12-31 NOTE — PROGRESS NOTES
[facility]  Orthopedics  Progress Note    Patient Name: Adrian Osorio  MRN: [id number]  Admission Date: 12/21/2018  Hospital Length of Stay: 10 days  Attending Provider: Pascale Nash MD  Primary Care Provider: Martín Perales MD  Follow-up For: Procedure(s) (LRB):  REVISION, TOTAL ARTHROPLASTY, HIP (Left)    Post-Operative Day: 7 Days Post-Op  Subjective:     Principal Problem:Hip dislocation, left, initial encounter    Principal Orthopedic Problem: S/P L KAYLEE    Interval History: multiple    Review of patient's allergies indicates:  No Known Allergies    Current Facility-Administered Medications   Medication    apixaban tablet 5 mg    aspirin chewable tablet 81 mg    atorvastatin tablet 40 mg    carvedilol tablet 3.125 mg    famotidine tablet 20 mg    furosemide injection 20 mg    HYDROcodone-acetaminophen 5-325 mg per tablet 1 tablet    megestrol 400 mg/10 mL (10 mL) suspension 200 mg    ondansetron injection 4 mg    polyethylene glycol packet 17 g    potassium chloride SA CR tablet 10 mEq    sodium chloride 0.9% flush 5 mL     Objective:     Vital Signs (Most Recent):  Temp: 98.8 °F (37.1 °C) (12/31/18 0813)  Pulse: 73 (12/31/18 0813)  Resp: 18 (12/31/18 0813)  BP: 133/62 (12/31/18 0813)  SpO2: 99 % (12/31/18 0813) Vital Signs (24h Range):  Temp:  [96.8 °F (36 °C)-99.3 °F (37.4 °C)] 98.8 °F (37.1 °C)  Pulse:  [65-76] 73  Resp:  [16-18] 18  SpO2:  [93 %-99 %] 99 %  BP: (117-142)/(59-63) 133/62     Weight: 94.3 kg (207 lb 14.3 oz)  Height: 6' (182.9 cm)(office 12/14)  Body mass index is 28.2 kg/m².      Intake/Output Summary (Last 24 hours) at 12/31/2018 0901  Last data filed at 12/31/2018 0600  Gross per 24 hour   Intake 465 ml   Output --   Net 465 ml       General    Nursing note and vitals reviewed.  Constitutional: He appears well-developed and well-nourished.   Pulmonary/Chest: Effort normal.   Abdominal: Soft.   Neurological: He is alert.   Psychiatric: He has a  normal mood and affect. His behavior is normal.         Left Hip Exam     Comments:  Dressings C/D/I. LLE DNVI            Significant Labs:   CBC:   Recent Labs   Lab 12/30/18  0602 12/31/18  0611   WBC 6.20 6.70   HGB 8.8* 8.4*   HCT 27.0* 25.6*    344     CMP:   Recent Labs   Lab 12/30/18  0602 12/31/18  0611    140   K 4.2 4.0    108   CO2 27 24   GLU 85 91   BUN 18 21   CREATININE 0.9 1.2   CALCIUM 8.1* 8.1*   PROT 5.6* 5.3*   ALBUMIN 2.1* 2.1*   BILITOT 0.4 0.4   ALKPHOS 75 76   AST 18 20   ALT 13 16   ANIONGAP 6* 8   EGFRNONAA >60 55*     All pertinent labs within the past 24 hours have been reviewed.    Significant Imaging: X-Ray: I have reviewed all pertinent results/findings and my personal findings are:  L hip X-rays from the other day are negative for acute injury    Assessment/Plan:     * Hip dislocation, left, initial encounter    Stable from a postoperative perspective.  Pt is ready to go home  CVA has been r/o           MERCEDES GUEVARA  Orthopedics  [facility]

## 2018-12-31 NOTE — PLAN OF CARE
"Problem: Adult Inpatient Plan of Care  Goal: Plan of Care Review  Outcome: Ongoing (interventions implemented as appropriate)  Plan of care review with pt, pt states "understanding," no redness/swelling noted to IV site, small hematoma posterior head, right lower eye dark red in color, s/p fall,dressing remains dry/intact to left hip, immobilizer in place, + nimo pedal pulses, pt is very [place], confused at times, easily redirected, pain is control with current regimen, daughter at bedside, avasyst in progress, will continue to monitor, observe and note any changes, safety maintain        "

## 2018-12-31 NOTE — SUBJECTIVE & OBJECTIVE
Principal Problem:Hip dislocation, left, initial encounter    Principal Orthopedic Problem: S/P L KAYLEE    Interval History: multiple    Review of patient's allergies indicates:  No Known Allergies    Current Facility-Administered Medications   Medication    apixaban tablet 5 mg    aspirin chewable tablet 81 mg    atorvastatin tablet 40 mg    carvedilol tablet 3.125 mg    famotidine tablet 20 mg    furosemide injection 20 mg    HYDROcodone-acetaminophen 5-325 mg per tablet 1 tablet    megestrol 400 mg/10 mL (10 mL) suspension 200 mg    ondansetron injection 4 mg    polyethylene glycol packet 17 g    potassium chloride SA CR tablet 10 mEq    sodium chloride 0.9% flush 5 mL     Objective:     Vital Signs (Most Recent):  Temp: 98.8 °F (37.1 °C) (12/31/18 0813)  Pulse: 73 (12/31/18 0813)  Resp: 18 (12/31/18 0813)  BP: 133/62 (12/31/18 0813)  SpO2: 99 % (12/31/18 0813) Vital Signs (24h Range):  Temp:  [96.8 °F (36 °C)-99.3 °F (37.4 °C)] 98.8 °F (37.1 °C)  Pulse:  [65-76] 73  Resp:  [16-18] 18  SpO2:  [93 %-99 %] 99 %  BP: (117-142)/(59-63) 133/62     Weight: 94.3 kg (207 lb 14.3 oz)  Height: 6' (182.9 cm)(office 12/14)  Body mass index is 28.2 kg/m².      Intake/Output Summary (Last 24 hours) at 12/31/2018 0901  Last data filed at 12/31/2018 0600  Gross per 24 hour   Intake 465 ml   Output --   Net 465 ml       General    Nursing note and vitals reviewed.  Constitutional: He appears well-developed and well-nourished.   Pulmonary/Chest: Effort normal.   Abdominal: Soft.   Neurological: He is alert.   Psychiatric: He has a normal mood and affect. His behavior is normal.         Left Hip Exam     Comments:  Dressings C/D/I. LLE DNVI            Significant Labs:   CBC:   Recent Labs   Lab 12/30/18  0602 12/31/18  0611   WBC 6.20 6.70   HGB 8.8* 8.4*   HCT 27.0* 25.6*    344     CMP:   Recent Labs   Lab 12/30/18  0602 12/31/18  0611    140   K 4.2 4.0    108   CO2 27 24   GLU 85 91   BUN 18 21    CREATININE 0.9 1.2   CALCIUM 8.1* 8.1*   PROT 5.6* 5.3*   ALBUMIN 2.1* 2.1*   BILITOT 0.4 0.4   ALKPHOS 75 76   AST 18 20   ALT 13 16   ANIONGAP 6* 8   EGFRNONAA >60 55*     All pertinent labs within the past 24 hours have been reviewed.    Significant Imaging: X-Ray: I have reviewed all pertinent results/findings and my personal findings are:  L hip X-rays from the other day are negative for acute injury

## 2019-01-01 LAB
ALBUMIN SERPL BCP-MCNC: 2.2 G/DL
ALP SERPL-CCNC: 88 U/L
ALT SERPL W/O P-5'-P-CCNC: 15 U/L
ANION GAP SERPL CALC-SCNC: 8 MMOL/L
AST SERPL-CCNC: 17 U/L
BASOPHILS # BLD AUTO: 0 K/UL
BASOPHILS NFR BLD: 0.7 %
BILIRUB SERPL-MCNC: 0.4 MG/DL
BUN SERPL-MCNC: 18 MG/DL
CALCIUM SERPL-MCNC: 8.2 MG/DL
CHLORIDE SERPL-SCNC: 107 MMOL/L
CO2 SERPL-SCNC: 23 MMOL/L
CREAT SERPL-MCNC: 0.9 MG/DL
DIFFERENTIAL METHOD: ABNORMAL
EOSINOPHIL # BLD AUTO: 0.5 K/UL
EOSINOPHIL NFR BLD: 7 %
ERYTHROCYTE [DISTWIDTH] IN BLOOD BY AUTOMATED COUNT: 16.1 %
EST. GFR  (AFRICAN AMERICAN): >60 ML/MIN/1.73 M^2
EST. GFR  (NON AFRICAN AMERICAN): >60 ML/MIN/1.73 M^2
GLUCOSE SERPL-MCNC: 85 MG/DL
HCT VFR BLD AUTO: 27 %
HGB BLD-MCNC: 8.5 G/DL
LYMPHOCYTES # BLD AUTO: 1.5 K/UL
LYMPHOCYTES NFR BLD: 22.8 %
MAGNESIUM SERPL-MCNC: 1.9 MG/DL
MCH RBC QN AUTO: 28.4 PG
MCHC RBC AUTO-ENTMCNC: 31.4 G/DL
MCV RBC AUTO: 90 FL
MONOCYTES # BLD AUTO: 0.8 K/UL
MONOCYTES NFR BLD: 12 %
NEUTROPHILS # BLD AUTO: 3.8 K/UL
NEUTROPHILS NFR BLD: 57.5 %
PLATELET # BLD AUTO: 341 K/UL
PMV BLD AUTO: 7 FL
POTASSIUM SERPL-SCNC: 3.8 MMOL/L
PROT SERPL-MCNC: 5.4 G/DL
RBC # BLD AUTO: 2.99 M/UL
SODIUM SERPL-SCNC: 138 MMOL/L
WBC # BLD AUTO: 6.6 K/UL

## 2019-01-01 PROCEDURE — 97116 GAIT TRAINING THERAPY: CPT

## 2019-01-01 PROCEDURE — 85025 COMPLETE CBC W/AUTO DIFF WBC: CPT

## 2019-01-01 PROCEDURE — S0179 MEGESTROL 20 MG: HCPCS | Performed by: INTERNAL MEDICINE

## 2019-01-01 PROCEDURE — 25000003 PHARM REV CODE 250: Performed by: INTERNAL MEDICINE

## 2019-01-01 PROCEDURE — 36415 COLL VENOUS BLD VENIPUNCTURE: CPT

## 2019-01-01 PROCEDURE — 80053 COMPREHEN METABOLIC PANEL: CPT

## 2019-01-01 PROCEDURE — 94761 N-INVAS EAR/PLS OXIMETRY MLT: CPT

## 2019-01-01 PROCEDURE — 83735 ASSAY OF MAGNESIUM: CPT

## 2019-01-01 PROCEDURE — 63600175 PHARM REV CODE 636 W HCPCS: Performed by: INTERNAL MEDICINE

## 2019-01-01 PROCEDURE — 97803 MED NUTRITION INDIV SUBSEQ: CPT

## 2019-01-01 PROCEDURE — 12000002 HC ACUTE/MED SURGE SEMI-PRIVATE ROOM

## 2019-01-01 PROCEDURE — 25000003 PHARM REV CODE 250: Performed by: ORTHOPAEDIC SURGERY

## 2019-01-01 RX ORDER — DONEPEZIL HYDROCHLORIDE 5 MG/1
5 TABLET, FILM COATED ORAL NIGHTLY
Status: DISCONTINUED | OUTPATIENT
Start: 2019-01-01 | End: 2019-01-02 | Stop reason: HOSPADM

## 2019-01-01 RX ORDER — SENNOSIDES 8.6 MG/1
8.6 TABLET ORAL DAILY PRN
Status: DISCONTINUED | OUTPATIENT
Start: 2019-01-01 | End: 2019-01-02 | Stop reason: HOSPADM

## 2019-01-01 RX ORDER — MEMANTINE HYDROCHLORIDE 5 MG/1
10 TABLET ORAL 2 TIMES DAILY
Status: DISCONTINUED | OUTPATIENT
Start: 2019-01-01 | End: 2019-01-02 | Stop reason: HOSPADM

## 2019-01-01 RX ADMIN — CARVEDILOL 3.12 MG: 3.12 TABLET, FILM COATED ORAL at 05:01

## 2019-01-01 RX ADMIN — POLYETHYLENE GLYCOL 3350 17 G: 17 POWDER, FOR SOLUTION ORAL at 09:01

## 2019-01-01 RX ADMIN — FAMOTIDINE 20 MG: 20 TABLET ORAL at 09:01

## 2019-01-01 RX ADMIN — APIXABAN 5 MG: 2.5 TABLET, FILM COATED ORAL at 09:01

## 2019-01-01 RX ADMIN — CARVEDILOL 3.12 MG: 3.12 TABLET, FILM COATED ORAL at 09:01

## 2019-01-01 RX ADMIN — MEGESTROL ACETATE 200 MG: 40 SUSPENSION ORAL at 09:01

## 2019-01-01 RX ADMIN — SENNOSIDES 8.6 MG: 8.6 TABLET, FILM COATED ORAL at 09:01

## 2019-01-01 RX ADMIN — ATORVASTATIN CALCIUM 40 MG: 40 TABLET, FILM COATED ORAL at 09:01

## 2019-01-01 RX ADMIN — FUROSEMIDE 20 MG: 10 INJECTION, SOLUTION INTRAVENOUS at 09:01

## 2019-01-01 RX ADMIN — HYDROCODONE BITARTRATE AND ACETAMINOPHEN 1 TABLET: 5; 325 TABLET ORAL at 08:01

## 2019-01-01 RX ADMIN — ASPIRIN 81 MG CHEWABLE TABLET 81 MG: 81 TABLET CHEWABLE at 09:01

## 2019-01-01 RX ADMIN — DONEPEZIL HYDROCHLORIDE 5 MG: 5 TABLET, FILM COATED ORAL at 08:01

## 2019-01-01 RX ADMIN — POTASSIUM CHLORIDE 10 MEQ: 750 TABLET, EXTENDED RELEASE ORAL at 09:01

## 2019-01-01 RX ADMIN — MEMANTINE 10 MG: 5 TABLET ORAL at 09:01

## 2019-01-01 RX ADMIN — HYDROCODONE BITARTRATE AND ACETAMINOPHEN 1 TABLET: 5; 325 TABLET ORAL at 10:01

## 2019-01-01 RX ADMIN — MEMANTINE 10 MG: 5 TABLET ORAL at 08:01

## 2019-01-01 RX ADMIN — FAMOTIDINE 20 MG: 20 TABLET ORAL at 08:01

## 2019-01-01 RX ADMIN — POLYETHYLENE GLYCOL 3350 17 G: 17 POWDER, FOR SOLUTION ORAL at 03:01

## 2019-01-01 RX ADMIN — APIXABAN 5 MG: 2.5 TABLET, FILM COATED ORAL at 08:01

## 2019-01-01 NOTE — ASSESSMENT & PLAN NOTE
Right MCA stroke - not tPA candidate due to recent surgery and on blood thinners  Chronic R MCA CVA  CT head w/o acute findings.  MRI shows no acute infarct  Neurologically improved  A-fib on Eliquis and ASA.   PT/OT/ST  EF 18%

## 2019-01-01 NOTE — CONSULTS
[facility]  Adult Nutrition  Consult Note    SUMMARY    Intervention: Nutrition Supplement Therapy- commercial beverage, Nutrition Education, and texture modified diet    Recommendation:   1) Continue Dental soft diet as needed, changes per SLP   2) Continue Boost Plus BID   3) If pt's appetite improves consider cardiac restriction   4) Request weight update.     Goals: 1) PO intakes > 50% most meals and supplements by RD f/u 2) PO intake >=75% EEN during admit  Nutrition Goal Status: 1) met/discontinued 2) new  Communication of RD Recs: (POC, reviewed with RN)    Reason for Assessment    Reason For Assessment: RD follow up  Diagnosis: (Hip dislocation)  Relevant Medical History: dementia, HTN, CAD, CABG 2008, CHF  Interdisciplinary Rounds: did not attend    General Information Comments: 86 y/o male admitted with hip dislocation. Noted to have fair-poor appetite and on appetite stimulant, pt and daughter state he has been eating 0-50% (one 100% today) of his meals. Pt was in and out of the hospital for the last few months with decreased appetite, hx. of subjective 5 lb loss, but chart indicates wt fluctuated 95-85 kg x 1.5 years.  NFPE done, Mild muscle wasting seen. Daughter has been providing Atkins shakes at home for pt. Educated them both on nutrition supplement therapy.  1/1/18: Pt sleeping soundly. Nursing asked not to wake pt. Reports pt is eating well and times and uses Boost when po intake is less.  Notes he is receiving Boost.   Nutrition Discharge Planning: To be determined- Cardiac, dental soft diet + Boost Plus or Ensure 2 x daily or as needed    Nutrition Risk Screen    Nutrition Risk Screen: other (see comments)(Decreased appetite)    Nutrition/Diet History    Patient Reported Diet/Restrictions/Preferences: general  Spiritual, Cultural Beliefs, Hinduism Practices, Values that Affect Care: no  Supplemental Drinks or Food Habits: Atkins  Food Allergies: NKFA(or  intolerances)    Anthropometrics    Temp: 98.1 °F (36.7 °C)  Height Method: Measured  Height: 6'(office )  Height (inches): 72 in  Weight Method: Bed Scale  Weight: 94.3 kg (207 lb 14.3 oz)  Weight (lb): 207.9 lb  Ideal Body Weight (IBW), Male: 178 lb  % Ideal Body Weight, Male (lb): 116.8 lb  BMI (Calculated): 28.3  BMI Grade: 25 - 29.9 - overweight  Usual Body Weight (UBW), k kg(17)  Weight Change Amount: (? etiology of wt fluctuations)  % Usual Body Weight: 107.38  % Weight Change From Usual Weight: 7.16 %       Lab/Procedures/Meds    Pertinent Labs Reviewed: reviewed  BMP  Lab Results   Component Value Date     2019    K 3.8 2019     2019    CO2 23 2019    BUN 18 2019    CREATININE 0.9 2019    CALCIUM 8.2 (L) 2019    ANIONGAP 8 2019    ESTGFRAFRICA >60 2019    EGFRNONAA >60 2019     Lab Results   Component Value Date    ALBUMIN 2.2 (L) 2019     Pertinent Medications Reviewed: reviewed  Pertinent Medications Comments: megestrol, KCl    Physical Findings/Assessment         Estimated/Assessed Needs    Weight Used For Calorie Calculations: 87.1 kg (192 lb 0.3 oz)(18 estimated dry weight)  Energy Calorie Requirements (kcal): [place] St Jeor ( x 1.2) = 1915 kcal  Energy Need Method: [place]-St Jeor  Protein Requirements: 1.1 g protein/kg (age) = 96 g protein  Weight Used For Protein Calculations: 87.1 kg (192 lb 0.3 oz)  Fluid Requirements (mL): 1915 ml  Estimated Fluid Requirement Method: RDA Method  CHO Requirement: N/A      Nutrition Prescription Ordered    Current Diet Order: Dysphagia soft level 6    Evaluation of Received Nutrient/Fluid Intake    Energy Calories Required: not meeting needs  Protein Required: not meeting needs  Fluid Required: meeting needs  Tolerance: tolerating  % Intake of Estimated Energy Needs: 50-75% with use of at least one Boost daily.   % Meal Intake: 0-100% (Average 38% intake over last 8  documented meals)    Nutrition Risk    Level of Risk/Frequency of Follow-up: moderate 2 x weekly until appetite improves    Assessment and Plan    Moderate malnutrition    Contributing Nutrition Diagnosis  Moderate Chronic illness Related Malnutrition    Related to (etiology):   Decreased appetite and decreased ability to perform nutrition related ADLs/dementia    Signs and Symptoms (as evidenced by):   1) PO intakes < 75% of estimated needs x > 1 month 2) Mild muscle/fat loss @ temples, orbital area, clavicle, scapula, tricep, biceps, calves, and interosseous    Interventions/Recommendations (treatment strategy):  1) Boost Plus BID 2) Liberalized diet, texture modification only until appetite improves    Nutrition Diagnosis Status:  Improving            Monitor and Evaluation    Food and Nutrient Intake: energy intake, food and beverage intake  Food and Nutrient Adminstration: diet order  Physical Activity and Function: nutrition-related ADLs and IADLs  Anthropometric Measurements: weight  Biochemical Data, Medical Tests and Procedures: electrolyte and renal panel  Nutrition-Focused Physical Findings: overall appearance     Malnutrition Assessment  Energy Intake: severe energy intake  Skin (Micronutrient): (Yan = 15, + hip incision)  Teeth (Micronutrient): (Missing)   Micronutrient Evaluation: suspected deficiency  Micronutrient Evaluation Comments: Sebas benefit from MVI   Energy Intake (Malnutrition): less than 75% for greater than or equal to 1 month  Subcutaneous Fat (Malnutrition): mild depletion  Muscle Mass (Malnutrition): mild depletion   Orbital Region (Subcutaneous Fat Loss): mild depletion  Upper Arm Region (Subcutaneous Fat Loss): mild depletion   Mormon Region (Muscle Loss): mild depletion  Clavicle Bone Region (Muscle Loss): mild depletion  Clavicle and Acromion Bone Region (Muscle Loss): mild depletion  Scapular Bone Region (Muscle Loss): mild depletion  Dorsal Hand (Muscle Loss): mild  depletion  Posterior Calf Region (Muscle Loss): mild depletion   Edema (Fluid Accumulation): 0-->no edema present   Subcutaneous Fat Loss (Final Summary): mild protein-calorie malnutrition  Muscle Loss Evaluation (Final Summary): mild protein-calorie malnutrition         Nutrition Follow-Up    RD Follow-up?: Yes

## 2019-01-01 NOTE — PT/OT/SLP PROGRESS
"Physical Therapy Treatment    Patient Name:  Adrian Osorio   MRN:  [id number]    Recommendations:     Discharge Recommendations:  nursing facility, skilled   Discharge Equipment Recommendations:     Barriers to discharge: None    Assessment:     Adrian Osorio is a 85 y.o. male admitted with a medical diagnosis of Hip dislocation, left, initial encounter.  He presents with the following impairments/functional limitations:  weakness, impaired self care skills, impaired balance, impaired endurance, impaired functional mobilty, decreased safety awareness, decreased ROM, pain, orthopedic precautions, impaired cardiopulmonary response to activity, gait instability, decreased lower extremity function .    Rehab Prognosis: Good; patient would benefit from acute skilled PT services to address these deficits and reach maximum level of function.    Recent Surgery: Procedure(s) (LRB):  REVISION, TOTAL ARTHROPLASTY, HIP (Left) 8 Days Post-Op    Plan:     During this hospitalization, patient to be seen BID to address the identified rehab impairments via gait training, therapeutic activities, therapeutic exercises and progress toward the following goals:    · Plan of Care Expires:       Subjective     Chief Complaint: "I'm just so cold."  Patient/Family Comments/goals: Daughter reports pt cannot remember his precautions, such as crossing his legs.  Pain/Comfort:  · Pain Rating 1: 5/10  · Location - Side 1: Left  · Location - Orientation 1: generalized  · Location 1: hip  · Pain Addressed 1: Pre-medicate for activity, Nurse notified, Reposition      Objective:     Communicated with primary nurse Anu prior to session.  Patient found supine with oxygen, telemetry, bed alarm, hip abduction brace, (Carolynn Odin, daughter at bedside) upon PT entry to room.     General Precautions: Standard, hearing impaired, fall   Orthopedic Precautions:LLE weight bearing as tolerated, LLE posterior precautions   Braces: Hip abduction " brace     Functional Mobility:  · Bed Mobility:     · Rolling Left:  stand by assistance  · Rolling Right: contact guard assistance  · Scooting: stand by assistance  · Supine to Sit: minimum assistance  · Sit to Supine: minimum assistance  · Transfers:     · Sit to Stand:  minimum assistance with rolling walker  · Gait: 55 ft with RW at CGA/min A, WBAT LLE with hip ABD in place, verbal and tactile cues for upright posture and increased step width      AM-PAC 6 CLICK MOBILITY          Therapeutic Activities and Exercises:     Transferred to sitting EOB. Ambulated in hallway as noted and returned to bed with A for LLE's and cues for scooting towards HOB. Reviewed orthopedic precautions with patient. Patient left with dtr at bedside.       Patient left supine with all lines intact, call button in reach, bed alarm on, primary nurse notified and daughter present..    GOALS:   Multidisciplinary Problems     Physical Therapy Goals        Problem: Physical Therapy Goal    Goal Priority Disciplines Outcome Goal Variances Interventions   Physical Therapy Goal     PT, PT/OT      Description:  Goals to be met by: 2019     Patient will increase functional independence with mobility by performin). Supine to sit with Stand-by Assistance  2). Sit to supine with Stand-by Assistance  3). Sit to stand transfer with Contact Guard Assistance  4). Bed to chair transfer with Contact Guard Assistance using Rolling Walker  5). Gait  x  > 50 feet with Contact Guard Assistance using Rolling Walker.                        Time Tracking:     PT Received On: 19  PT Start Time: 1230     PT Stop Time: 1255  PT Total Time (min): 25 min     Billable Minutes: Gait Training 10    Treatment Type: Treatment  PT/PTA: PT         Stephanie Ceja, PT  2019

## 2019-01-01 NOTE — PROGRESS NOTES
[facility]  Orthopedics  Progress Note    Patient Name: Adrian Osorio  MRN: [id number]  Admission Date: 12/21/2018  Hospital Length of Stay: 11 days  Attending Provider: Pascale Nash MD  Primary Care Provider: Martín Perales MD  Follow-up For: Procedure(s) (LRB):  REVISION, TOTAL ARTHROPLASTY, HIP (Left)    Post-Operative Day: 8 Days Post-Op  Subjective:     Principal Problem:Hip dislocation, left, initial encounter    Principal Orthopedic Problem: S/P L KAYLEE    Interval History: multiple    Review of patient's allergies indicates:  No Known Allergies    Current Facility-Administered Medications   Medication    apixaban tablet 5 mg    aspirin chewable tablet 81 mg    atorvastatin tablet 40 mg    carvedilol tablet 3.125 mg    donepezil tablet 5 mg    famotidine tablet 20 mg    furosemide injection 20 mg    HYDROcodone-acetaminophen 5-325 mg per tablet 1 tablet    megestrol 400 mg/10 mL (10 mL) suspension 200 mg    memantine tablet 10 mg    ondansetron injection 4 mg    polyethylene glycol packet 17 g    potassium chloride SA CR tablet 10 mEq    senna tablet 8.6 mg    sodium chloride 0.9% flush 5 mL     Objective:     Vital Signs (Most Recent):  Temp: 98.1 °F (36.7 °C) (01/01/19 1254)  Pulse: 78 (01/01/19 1254)  Resp: 20 (01/01/19 1254)  BP: (!) 113/55 (01/01/19 1254)  SpO2: 97 % (01/01/19 1254) Vital Signs (24h Range):  Temp:  [96 °F (35.6 °C)-99.3 °F (37.4 °C)] 98.1 °F (36.7 °C)  Pulse:  [69-81] 78  Resp:  [16-20] 20  SpO2:  [94 %-100 %] 97 %  BP: (113-173)/(55-82) 113/55     Weight: 94.3 kg (207 lb 14.3 oz)  Height: 6' (182.9 cm)(office 12/14)  Body mass index is 28.2 kg/m².      Intake/Output Summary (Last 24 hours) at 1/1/2019 1332  Last data filed at 1/1/2019 0500  Gross per 24 hour   Intake 360 ml   Output --   Net 360 ml       General    Nursing note and vitals reviewed.  Constitutional: He appears well-developed and well-nourished.   Pulmonary/Chest: Effort  normal.   Abdominal: Soft.   Neurological: He is alert.   Psychiatric: He has a normal mood and affect. His behavior is normal.         Left Hip Exam     Comments:  Dressings C/D/I. LLE DNVI            Significant Labs:   CBC:   Recent Labs   Lab 12/31/18  0611 01/01/19  0531   WBC 6.70 6.60   HGB 8.4* 8.5*   HCT 25.6* 27.0*    341     CMP:   Recent Labs   Lab 12/31/18  0611 01/01/19  0531    138   K 4.0 3.8    107   CO2 24 23   GLU 91 85   BUN 21 18   CREATININE 1.2 0.9   CALCIUM 8.1* 8.2*   PROT 5.3* 5.4*   ALBUMIN 2.1* 2.2*   BILITOT 0.4 0.4   ALKPHOS 76 88   AST 20 17   ALT 16 15   ANIONGAP 8 8   EGFRNONAA 55* >60     All pertinent labs within the past 24 hours have been reviewed.    Significant Imaging: X-Ray: I have reviewed all pertinent results/findings and my personal findings are:  L hip X-rays from the other day are negative for acute injury    Assessment/Plan:     * Hip dislocation, left, initial encounter    Stable from a postoperative perspective.  Awaiting SNF transfer           MERCEDES GUEVARA  Orthopedics  [facility]

## 2019-01-01 NOTE — SUBJECTIVE & OBJECTIVE
Subjective:     Interval History: Pt seen and examined with wife at bedside. He has pain due to recent surgery but neurologically stable. Will need continued rehab.     Current Neurological Medications: reviewed     Current Facility-Administered Medications   Medication Dose Route Frequency Provider Last Rate Last Dose    apixaban tablet 5 mg  5 mg Oral BID Pascale Nash MD   5 mg at 12/31/18 0922    aspirin chewable tablet 81 mg  81 mg Oral Daily Pascale Nash MD   81 mg at 12/31/18 0922    atorvastatin tablet 40 mg  40 mg Oral Daily Fady Mortensen MD   40 mg at 12/31/18 0922    carvedilol tablet 3.125 mg  3.125 mg Oral BID  Fady Mortensen MD   3.125 mg at 12/31/18 1637    famotidine tablet 20 mg  20 mg Oral BID Brando Neves MD   20 mg at 12/31/18 0922    furosemide injection 20 mg  20 mg Intravenous Daily Pascale Nash MD   20 mg at 12/31/18 1028    HYDROcodone-acetaminophen 5-325 mg per tablet 1 tablet  1 tablet Oral Q4H PRN Brando Neves MD   1 tablet at 12/31/18 0719    megestrol 400 mg/10 mL (10 mL) suspension 200 mg  200 mg Oral Daily Pascale Nash MD   200 mg at 12/31/18 0922    ondansetron injection 4 mg  4 mg Intravenous Q6H PRN Fady Mortensen MD   4 mg at 12/29/18 1228    polyethylene glycol packet 17 g  17 g Oral TID Fady Mortesnen MD   17 g at 12/31/18 1523    potassium chloride SA CR tablet 10 mEq  10 mEq Oral Daily Pascale Nash MD   10 mEq at 12/31/18 0922    sodium chloride 0.9% flush 5 mL  5 mL Intravenous PRN Brando Neves MD           Review of Systems   Constitutional: Negative.    HENT: Negative.    Eyes: Negative.    Respiratory: Negative.    Cardiovascular: Negative.    Gastrointestinal: Negative.    Endocrine: Negative.    Genitourinary: Negative.    Musculoskeletal: Negative.    Skin: Negative.    Allergic/Immunologic: Negative.    Neurological: Negative.    Hematological: Negative.    Psychiatric/Behavioral: Negative.      Objective:     Vital Signs (Most  Recent):  Temp: 98.7 °F (37.1 °C) (12/31/18 1523)  Pulse: 75 (12/31/18 1600)  Resp: 18 (12/31/18 1523)  BP: 131/70 (12/31/18 1523)  SpO2: 99 % (12/31/18 1613) Vital Signs (24h Range):  Temp:  [97.3 °F (36.3 °C)-99.3 °F (37.4 °C)] 98.7 °F (37.1 °C)  Pulse:  [69-76] 75  Resp:  [16-18] 18  SpO2:  [93 %-99 %] 99 %  BP: (117-141)/(59-70) 131/70     Weight: 94.3 kg (207 lb 14.3 oz)  Body mass index is 28.2 kg/m².    Physical Exam   Constitutional: He is oriented to person, place, and time. He appears well-developed and well-nourished.   HENT:   Head: Normocephalic and atraumatic.   Eyes: Pupils are equal, round, and reactive to light.   Cardiovascular: Normal rate and regular rhythm.   Pulmonary/Chest: Effort normal and breath sounds normal.   Abdominal: Soft. Bowel sounds are normal.   Musculoskeletal: Normal range of motion.   Neurological: He is alert and oriented to person, place, and time. He has normal reflexes. He has a normal Finger-Nose-Finger Test.   Skin: Skin is warm and dry.       NEUROLOGICAL EXAMINATION:     MENTAL STATUS   Oriented to person, place, and time.   Level of consciousness: alert    CRANIAL NERVES   Cranial nerves II through XII intact.     CN III, IV, VI   Pupils are equal, round, and reactive to light.    GAIT AND COORDINATION      Coordination   Finger to nose coordination: normal    Tremor   Resting tremor: absent  Intention tremor: absent      Significant Labs: reviewed     Significant Imaging: reviewed

## 2019-01-01 NOTE — HOSPITAL COURSE
12/31/18: Pt seen and examined lying in bed with wife at bedside. States he is having pain and difficulty with getting up.     CTA H/N: developmental anomalies, no acute findings.Old right MCA stroke.   CT: no acute findings.   MRI: 1. There is no acute abnormality.  There is generalized volume loss with marked nonspecific white matter change.  Additionally, there are chronic infarctions in the right cerebellum, right centrum semiovale, medial left basal ganglia.  There is no acute hemorrhage or mass effect.  There is no acute infarction.    TTE: EF 18%

## 2019-01-01 NOTE — ASSESSMENT & PLAN NOTE
Contributing Nutrition Diagnosis  Moderate Chronic illness Related Malnutrition    Related to (etiology):   Decreased appetite and decreased ability to perform nutrition related ADLs/dementia    Signs and Symptoms (as evidenced by):   1) PO intakes < 75% of estimated needs x > 1 month 2) Mild muscle/fat loss @ temples, orbital area, clavicle, scapula, tricep, biceps, calves, and interosseous    Interventions/Recommendations (treatment strategy):  1) Boost Plus BID 2) Liberalized diet, texture modification only until appetite improves    Nutrition Diagnosis Status:  Improving

## 2019-01-01 NOTE — PROGRESS NOTES
[facility]  Neurology  Progress Note    Patient Name: Adrian Osorio  MRN: [id number]  Admission Date: 12/21/2018  Hospital Length of Stay: 10 days  Code Status: Prior   Attending Provider: Pascale Nash MD  Primary Care Physician: Martín Perales MD   Principal Problem:Hip dislocation, left, initial encounter      Subjective:     Interval History: Pt seen and examined with wife at bedside. He has pain due to recent surgery but neurologically stable. Will need continued rehab.     Current Neurological Medications: reviewed     Current Facility-Administered Medications   Medication Dose Route Frequency Provider Last Rate Last Dose    apixaban tablet 5 mg  5 mg Oral BID Pascale Nash MD   5 mg at 12/31/18 0922    aspirin chewable tablet 81 mg  81 mg Oral Daily Pascale Nash MD   81 mg at 12/31/18 0922    atorvastatin tablet 40 mg  40 mg Oral Daily Fady Mortensen MD   40 mg at 12/31/18 0922    carvedilol tablet 3.125 mg  3.125 mg Oral BID WM Fady Mortensen MD   3.125 mg at 12/31/18 1637    famotidine tablet 20 mg  20 mg Oral BID Brando Neves MD   20 mg at 12/31/18 0922    furosemide injection 20 mg  20 mg Intravenous Daily Pascale Nash MD   20 mg at 12/31/18 1028    HYDROcodone-acetaminophen 5-325 mg per tablet 1 tablet  1 tablet Oral Q4H PRN Brando Neves MD   1 tablet at 12/31/18 0719    megestrol 400 mg/10 mL (10 mL) suspension 200 mg  200 mg Oral Daily Pascale Nash MD   200 mg at 12/31/18 0922    ondansetron injection 4 mg  4 mg Intravenous Q6H PRN Fady Mortensen MD   4 mg at 12/29/18 1228    polyethylene glycol packet 17 g  17 g Oral TID Fady Mortensen MD   17 g at 12/31/18 1523    potassium chloride SA CR tablet 10 mEq  10 mEq Oral Daily Pascale Nash MD   10 mEq at 12/31/18 0922    sodium chloride 0.9% flush 5 mL  5 mL Intravenous PRN Brando Neves MD           Review of Systems   Constitutional: Negative.    HENT: Negative.    Eyes: Negative.     Respiratory: Negative.    Cardiovascular: Negative.    Gastrointestinal: Negative.    Endocrine: Negative.    Genitourinary: Negative.    Musculoskeletal: Negative.    Skin: Negative.    Allergic/Immunologic: Negative.    Neurological: Negative.    Hematological: Negative.    Psychiatric/Behavioral: Negative.      Objective:     Vital Signs (Most Recent):  Temp: 98.7 °F (37.1 °C) (12/31/18 1523)  Pulse: 75 (12/31/18 1600)  Resp: 18 (12/31/18 1523)  BP: 131/70 (12/31/18 1523)  SpO2: 99 % (12/31/18 1613) Vital Signs (24h Range):  Temp:  [97.3 °F (36.3 °C)-99.3 °F (37.4 °C)] 98.7 °F (37.1 °C)  Pulse:  [69-76] 75  Resp:  [16-18] 18  SpO2:  [93 %-99 %] 99 %  BP: (117-141)/(59-70) 131/70     Weight: 94.3 kg (207 lb 14.3 oz)  Body mass index is 28.2 kg/m².    Physical Exam   Constitutional: He is oriented to person, place, and time. He appears well-developed and well-nourished.   HENT:   Head: Normocephalic and atraumatic.   Eyes: Pupils are equal, round, and reactive to light.   Cardiovascular: Normal rate and regular rhythm.   Pulmonary/Chest: Effort normal and breath sounds normal.   Abdominal: Soft. Bowel sounds are normal.   Musculoskeletal: Normal range of motion.   Neurological: He is alert and oriented to person, place, and time. He has normal reflexes. He has a normal Finger-Nose-Finger Test.   Skin: Skin is warm and dry.       NEUROLOGICAL EXAMINATION:     MENTAL STATUS   Oriented to person, place, and time.   Level of consciousness: alert    CRANIAL NERVES   Cranial nerves II through XII intact.     CN III, IV, VI   Pupils are equal, round, and reactive to light.    GAIT AND COORDINATION      Coordination   Finger to nose coordination: normal    Tremor   Resting tremor: absent  Intention tremor: absent      Significant Labs: reviewed     Significant Imaging: reviewed     Assessment and Plan:     Chronic ischemic right MCA stroke       Right MCA stroke - not tPA candidate due to recent surgery and on blood  thinners  Chronic R MCA CVA  CT head w/o acute findings.  MRI shows no acute infarct  Neurologically improved  A-fib on Eliquis and ASA.   PT/OT/ST  EF 18%                 Alzheimer's dementia without behavioral disturbance    OK to resume dementia medicaitons         VTE Risk Mitigation (From admission, onward)        Ordered     apixaban tablet 5 mg  2 times daily      12/28/18 4309          MICHAEL Santoyo  [facility]    I, Dr. Shaquille Patel, discussed care with my advanced practitioner and agree with above. I have reviewed patient clinical presentation, work up, impression and plan. Patient much improved. Continue current care.

## 2019-01-01 NOTE — PROGRESS NOTES
01/01/19 0835   Patient Assessment/Suction   Level of Consciousness (AVPU) alert   Respiratory Effort Normal;Unlabored   PRE-TX-O2-ETCO2   O2 Device (Oxygen Therapy) room air   SpO2 98 %   Pulse Oximetry Type Intermittent   $ Pulse Oximetry - Multiple Charge Pulse Oximetry - Multiple   Pulse 72   Resp 18

## 2019-01-01 NOTE — PLAN OF CARE
Problem: Adult Inpatient Plan of Care  Goal: Plan of Care Review  Outcome: Ongoing (interventions implemented as appropriate)  Plan of care reviewed with patient & daughter. Patient oriented to self. SR on telemetry. L. Hip dressing clean, dry & intact. L. Hip immobilizer in use. Remains free from falls/injury. Instructed patient & family to call for assistance as needed during night. Verbalized understanding.  Call light in reach.  Bed in low & locked position. Bed alarm & avasys in use. Daughter at bed side.

## 2019-01-01 NOTE — PLAN OF CARE
Problem: Physical Therapy Goal  Goal: Physical Therapy Goal  Goals to be met by: 2019     Patient will increase functional independence with mobility by performin). Supine to sit with Stand-by Assistance  2). Sit to supine with Stand-by Assistance  3). Sit to stand transfer with Contact Guard Assistance  4). Bed to chair transfer with Contact Guard Assistance using Rolling Walker  5). Gait  x  > 50 feet with Contact Guard Assistance using Rolling Walker.      Outcome: Ongoing (interventions implemented as appropriate)  Patient ambulating 55 ft at CGA/min A using RW with LLE WBAT and L hip ABD brace donned, cues for upright posture and increased step width.

## 2019-01-01 NOTE — PLAN OF CARE
Problem: Malnutrition  Goal: Improved Nutritional Intake    Intervention: Promote and Optimize Oral Intake   Intervention: Nutrition Supplement Therapy- commercial beverage, Nutrition Education, and texture modified diet    Recommendation:   1) Continue Dental soft diet as needed, changes per SLP   2) Continue Boost Plus BID   3) If pt's appetite improves consider cardiac restriction   4) Request weight update.     Goals: 1) PO intakes > 50% most meals and supplements by RD f/u 2) PO intake >=75% EEN during admit  Nutrition Goal Status: 1) met/discontinued 2) new  Communication of RD Recs: (POC, reviewed with RN)

## 2019-01-01 NOTE — PLAN OF CARE
Problem: Adult Inpatient Plan of Care  Goal: Plan of Care Review  Outcome: Ongoing (interventions implemented as appropriate)  Pt remains free from fall or injury. Up with assistance to the bathroom and chair. Pain controlled with PO meds. Daughter at the bedside, updated on POC. Avasys in use. Bed alarm set. Skin remains intact. Afebrile and VS stable.Call light in reach. Will continue to monitor.

## 2019-01-01 NOTE — SUBJECTIVE & OBJECTIVE
Principal Problem:Hip dislocation, left, initial encounter    Principal Orthopedic Problem: S/P L KAYLEE    Interval History: multiple    Review of patient's allergies indicates:  No Known Allergies    Current Facility-Administered Medications   Medication    apixaban tablet 5 mg    aspirin chewable tablet 81 mg    atorvastatin tablet 40 mg    carvedilol tablet 3.125 mg    donepezil tablet 5 mg    famotidine tablet 20 mg    furosemide injection 20 mg    HYDROcodone-acetaminophen 5-325 mg per tablet 1 tablet    megestrol 400 mg/10 mL (10 mL) suspension 200 mg    memantine tablet 10 mg    ondansetron injection 4 mg    polyethylene glycol packet 17 g    potassium chloride SA CR tablet 10 mEq    senna tablet 8.6 mg    sodium chloride 0.9% flush 5 mL     Objective:     Vital Signs (Most Recent):  Temp: 98.1 °F (36.7 °C) (01/01/19 1254)  Pulse: 78 (01/01/19 1254)  Resp: 20 (01/01/19 1254)  BP: (!) 113/55 (01/01/19 1254)  SpO2: 97 % (01/01/19 1254) Vital Signs (24h Range):  Temp:  [96 °F (35.6 °C)-99.3 °F (37.4 °C)] 98.1 °F (36.7 °C)  Pulse:  [69-81] 78  Resp:  [16-20] 20  SpO2:  [94 %-100 %] 97 %  BP: (113-173)/(55-82) 113/55     Weight: 94.3 kg (207 lb 14.3 oz)  Height: 6' (182.9 cm)(office 12/14)  Body mass index is 28.2 kg/m².      Intake/Output Summary (Last 24 hours) at 1/1/2019 1332  Last data filed at 1/1/2019 0500  Gross per 24 hour   Intake 360 ml   Output --   Net 360 ml       General    Nursing note and vitals reviewed.  Constitutional: He appears well-developed and well-nourished.   Pulmonary/Chest: Effort normal.   Abdominal: Soft.   Neurological: He is alert.   Psychiatric: He has a normal mood and affect. His behavior is normal.         Left Hip Exam     Comments:  Dressings C/D/I. LLE DNVI            Significant Labs:   CBC:   Recent Labs   Lab 12/31/18  0611 01/01/19  0531   WBC 6.70 6.60   HGB 8.4* 8.5*   HCT 25.6* 27.0*    341     CMP:   Recent Labs   Lab 12/31/18  0611 01/01/19  0531     138   K 4.0 3.8    107   CO2 24 23   GLU 91 85   BUN 21 18   CREATININE 1.2 0.9   CALCIUM 8.1* 8.2*   PROT 5.3* 5.4*   ALBUMIN 2.1* 2.2*   BILITOT 0.4 0.4   ALKPHOS 76 88   AST 20 17   ALT 16 15   ANIONGAP 8 8   EGFRNONAA 55* >60     All pertinent labs within the past 24 hours have been reviewed.    Significant Imaging: X-Ray: I have reviewed all pertinent results/findings and my personal findings are:  L hip X-rays from the other day are negative for acute injury

## 2019-01-02 VITALS
WEIGHT: 207.88 LBS | DIASTOLIC BLOOD PRESSURE: 47 MMHG | HEART RATE: 78 BPM | SYSTOLIC BLOOD PRESSURE: 107 MMHG | BODY MASS INDEX: 28.16 KG/M2 | HEIGHT: 72 IN | TEMPERATURE: 98 F | RESPIRATION RATE: 20 BRPM | OXYGEN SATURATION: 97 %

## 2019-01-02 LAB
ALBUMIN SERPL BCP-MCNC: 2.1 G/DL
ALP SERPL-CCNC: 86 U/L
ALT SERPL W/O P-5'-P-CCNC: 11 U/L
ANION GAP SERPL CALC-SCNC: 8 MMOL/L
AST SERPL-CCNC: 17 U/L
BASOPHILS # BLD AUTO: 0 K/UL
BASOPHILS NFR BLD: 0.6 %
BILIRUB SERPL-MCNC: 0.4 MG/DL
BUN SERPL-MCNC: 15 MG/DL
CALCIUM SERPL-MCNC: 8.2 MG/DL
CHLORIDE SERPL-SCNC: 108 MMOL/L
CO2 SERPL-SCNC: 24 MMOL/L
CREAT SERPL-MCNC: 0.9 MG/DL
DIFFERENTIAL METHOD: ABNORMAL
EOSINOPHIL # BLD AUTO: 0.4 K/UL
EOSINOPHIL NFR BLD: 6.1 %
ERYTHROCYTE [DISTWIDTH] IN BLOOD BY AUTOMATED COUNT: 15.9 %
EST. GFR  (AFRICAN AMERICAN): >60 ML/MIN/1.73 M^2
EST. GFR  (NON AFRICAN AMERICAN): >60 ML/MIN/1.73 M^2
GLUCOSE SERPL-MCNC: 83 MG/DL
HCT VFR BLD AUTO: 27.1 %
HGB BLD-MCNC: 8.9 G/DL
LYMPHOCYTES # BLD AUTO: 1.5 K/UL
LYMPHOCYTES NFR BLD: 25.9 %
MAGNESIUM SERPL-MCNC: 2 MG/DL
MCH RBC QN AUTO: 29.6 PG
MCHC RBC AUTO-ENTMCNC: 32.8 G/DL
MCV RBC AUTO: 90 FL
MONOCYTES # BLD AUTO: 0.7 K/UL
MONOCYTES NFR BLD: 11.6 %
NEUTROPHILS # BLD AUTO: 3.3 K/UL
NEUTROPHILS NFR BLD: 55.8 %
PLATELET # BLD AUTO: 339 K/UL
PMV BLD AUTO: 7.1 FL
POTASSIUM SERPL-SCNC: 3.5 MMOL/L
PROT SERPL-MCNC: 5.4 G/DL
RBC # BLD AUTO: 3 M/UL
SODIUM SERPL-SCNC: 140 MMOL/L
WBC # BLD AUTO: 5.9 K/UL

## 2019-01-02 PROCEDURE — 36415 COLL VENOUS BLD VENIPUNCTURE: CPT

## 2019-01-02 PROCEDURE — 80053 COMPREHEN METABOLIC PANEL: CPT

## 2019-01-02 PROCEDURE — 25000003 PHARM REV CODE 250: Performed by: INTERNAL MEDICINE

## 2019-01-02 PROCEDURE — 83735 ASSAY OF MAGNESIUM: CPT

## 2019-01-02 PROCEDURE — 97116 GAIT TRAINING THERAPY: CPT

## 2019-01-02 PROCEDURE — 94761 N-INVAS EAR/PLS OXIMETRY MLT: CPT

## 2019-01-02 PROCEDURE — 85025 COMPLETE CBC W/AUTO DIFF WBC: CPT

## 2019-01-02 PROCEDURE — 63600175 PHARM REV CODE 636 W HCPCS: Performed by: INTERNAL MEDICINE

## 2019-01-02 PROCEDURE — 25000003 PHARM REV CODE 250: Performed by: ORTHOPAEDIC SURGERY

## 2019-01-02 PROCEDURE — 94760 N-INVAS EAR/PLS OXIMETRY 1: CPT

## 2019-01-02 PROCEDURE — S0179 MEGESTROL 20 MG: HCPCS | Performed by: INTERNAL MEDICINE

## 2019-01-02 RX ORDER — HYDROCODONE BITARTRATE AND ACETAMINOPHEN 5; 325 MG/1; MG/1
1 TABLET ORAL EVERY 4 HOURS PRN
Qty: 20 TABLET | Refills: 0 | Status: SHIPPED | OUTPATIENT
Start: 2019-01-02 | End: 2019-01-10 | Stop reason: SDUPTHER

## 2019-01-02 RX ORDER — NAPROXEN SODIUM 220 MG/1
81 TABLET, FILM COATED ORAL DAILY
Refills: 0 | COMMUNITY
Start: 2019-01-03 | End: 2020-01-03

## 2019-01-02 RX ORDER — ATORVASTATIN CALCIUM 40 MG/1
40 TABLET, FILM COATED ORAL DAILY
Qty: 30 TABLET | Refills: 0 | Status: SHIPPED | OUTPATIENT
Start: 2019-01-03 | End: 2020-01-03

## 2019-01-02 RX ORDER — FUROSEMIDE 20 MG/1
20 TABLET ORAL DAILY
Qty: 30 TABLET | Refills: 0 | Status: SHIPPED | OUTPATIENT
Start: 2019-01-02 | End: 2020-01-02

## 2019-01-02 RX ORDER — POLYETHYLENE GLYCOL 3350 17 G/17G
17 POWDER, FOR SOLUTION ORAL 2 TIMES DAILY PRN
Refills: 0
Start: 2019-01-02

## 2019-01-02 RX ADMIN — HYDROCODONE BITARTRATE AND ACETAMINOPHEN 1 TABLET: 5; 325 TABLET ORAL at 09:01

## 2019-01-02 RX ADMIN — POTASSIUM CHLORIDE 10 MEQ: 750 TABLET, EXTENDED RELEASE ORAL at 08:01

## 2019-01-02 RX ADMIN — MEGESTROL ACETATE 200 MG: 40 SUSPENSION ORAL at 08:01

## 2019-01-02 RX ADMIN — ASPIRIN 81 MG CHEWABLE TABLET 81 MG: 81 TABLET CHEWABLE at 08:01

## 2019-01-02 RX ADMIN — CARVEDILOL 3.12 MG: 3.12 TABLET, FILM COATED ORAL at 08:01

## 2019-01-02 RX ADMIN — ATORVASTATIN CALCIUM 40 MG: 40 TABLET, FILM COATED ORAL at 08:01

## 2019-01-02 RX ADMIN — MEMANTINE 10 MG: 5 TABLET ORAL at 08:01

## 2019-01-02 RX ADMIN — FAMOTIDINE 20 MG: 20 TABLET ORAL at 08:01

## 2019-01-02 RX ADMIN — FUROSEMIDE 20 MG: 10 INJECTION, SOLUTION INTRAVENOUS at 09:01

## 2019-01-02 RX ADMIN — APIXABAN 5 MG: 2.5 TABLET, FILM COATED ORAL at 08:01

## 2019-01-02 NOTE — PLAN OF CARE
Problem: Physical Therapy Goal  Goal: Physical Therapy Goal  Goals to be met by: 2019     Patient will increase functional independence with mobility by performin). Supine to sit with Stand-by Assistance  2). Sit to supine with Stand-by Assistance  3). Sit to stand transfer with Contact Guard Assistance  4). Bed to chair transfer with Contact Guard Assistance using Rolling Walker  5). Gait  x  > 50 feet with Contact Guard Assistance using Rolling Walker.       Outcome: Ongoing (interventions implemented as appropriate)  Ambulated with rw and Min A , WBAT LLE.

## 2019-01-02 NOTE — PROGRESS NOTES
[facility]  Neurology  Progress Note    Patient Name: Adrian Osorio  MRN: [id number]  Admission Date: 12/21/2018  Hospital Length of Stay: 12 days  Code Status: Prior   Attending Provider: Pascale Nash MD  Primary Care Physician: Martín Perales MD   Principal Problem:Hip dislocation, left, initial encounter    Subjective:     Interval History: Pt seen & examined. Pt is tolerating therapies & improving overall. No complaints voiced    Current Neurological Medications: reviewed    Current Facility-Administered Medications   Medication Dose Route Frequency Provider Last Rate Last Dose    apixaban tablet 5 mg  5 mg Oral BID Pascale Nash MD   5 mg at 01/01/19 2039    aspirin chewable tablet 81 mg  81 mg Oral Daily Pascale Nash MD   81 mg at 01/01/19 0910    atorvastatin tablet 40 mg  40 mg Oral Daily Fady Mortensen MD   40 mg at 01/01/19 0910    carvedilol tablet 3.125 mg  3.125 mg Oral BID WM Fady Mortensen MD   3.125 mg at 01/01/19 1709    donepezil tablet 5 mg  5 mg Oral QHS Pascale Nash MD   5 mg at 01/01/19 2039    famotidine tablet 20 mg  20 mg Oral BID Brando Neves MD   20 mg at 01/01/19 2039    furosemide injection 20 mg  20 mg Intravenous Daily Pascale Nash MD   20 mg at 01/01/19 0909    HYDROcodone-acetaminophen 5-325 mg per tablet 1 tablet  1 tablet Oral Q4H PRN Brando Neves MD   1 tablet at 01/01/19 2040    megestrol 400 mg/10 mL (10 mL) suspension 200 mg  200 mg Oral Daily Pascale Nash MD   200 mg at 01/01/19 0909    memantine tablet 10 mg  10 mg Oral BID Pascale Nash MD   10 mg at 01/01/19 2038    ondansetron injection 4 mg  4 mg Intravenous Q6H PRN Fady Mortensen MD   4 mg at 12/29/18 1228    polyethylene glycol packet 17 g  17 g Oral TID Fady Mortensen MD   17 g at 01/01/19 1524    potassium chloride SA CR tablet 10 mEq  10 mEq Oral Daily Pascale Nash MD   10 mEq at 01/01/19 0909    senna tablet 8.6 mg  8.6 mg Oral Daily PRN Pascale Nash  MD   8.6 mg at 01/01/19 0929    sodium chloride 0.9% flush 5 mL  5 mL Intravenous PRN Brando Neves MD           Review of Systems   Constitutional: Negative.    HENT: Negative.    Eyes: Negative.    Respiratory: Negative.    Cardiovascular: Negative.    Gastrointestinal: Negative.    Genitourinary: Negative.      Objective:     Vital Signs (Most Recent):  Temp: 98.7 °F (37.1 °C) (01/02/19 0323)  Pulse: 69 (01/02/19 0801)  Resp: 20 (01/02/19 0801)  BP: (!) 145/66 (01/02/19 0323)  SpO2: 96 % (01/02/19 0801) Vital Signs (24h Range):  Temp:  [98 °F (36.7 °C)-99 °F (37.2 °C)] 98.7 °F (37.1 °C)  Pulse:  [69-81] 69  Resp:  [16-20] 20  SpO2:  [94 %-98 %] 96 %  BP: (113-173)/(55-82) 145/66     Weight: 94.3 kg (207 lb 14.3 oz)  Body mass index is 28.2 kg/m².    Physical Exam  Constitutional: He is oriented to person, place, and time. He appears well-developed and well-nourished.   HENT:   Head: Normocephalic and atraumatic.   Eyes: Pupils are equal, round, and reactive to light.   Cardiovascular: Normal rate and regular rhythm.   Pulmonary/Chest: Effort normal and breath sounds normal.   Abdominal: Soft. Bowel sounds are normal.   Musculoskeletal: Normal range of motion.   Neurological: He is alert and oriented to person, place, and time. He has normal reflexes. He has a normal Finger-Nose-Finger Test.   Skin: Skin is warm and dry.         NEUROLOGICAL EXAMINATION:      MENTAL STATUS   Oriented to person, place, and time.   Level of consciousness: alert     CRANIAL NERVES   Cranial nerves II through XII intact.      CN III, IV, VI   Pupils are equal, round, and reactive to light.     Mild LLE weakness    GAIT AND COORDINATION      Coordination   Finger to nose coordination: normal     Tremor   Resting tremor: absent  Intention tremor: absent            Significant Labs: All pertinent lab results from the past 24 hours have been reviewed.    Significant Imaging: I have reviewed and interpreted all pertinent imaging  results/findings within the past 24 hours.    Assessment and Plan:     Chronic ischemic right MCA stroke         Right MCA stroke - not tPA candidate due to recent surgery and on blood thinners  Chronic R MCA CVA  CT head w/o acute findings.  MRI shows no acute infarct  Neurologically improved  A-fib on Eliquis and ASA.   PT/OT/ST  EF 18%                     Alzheimer's dementia without behavioral disturbance     OK to resume dementia medicaitons             Active Diagnoses:    Diagnosis Date Noted POA    PRINCIPAL PROBLEM:  Hip dislocation, left, initial encounter [S73.005A] 12/21/2018 Yes    Chronic ischemic right MCA stroke [I69.30] 12/31/2018 Not Applicable    Moderate malnutrition [E44.0] 12/27/2018 Yes    Alzheimer's dementia without behavioral disturbance [G30.9, F02.80] 10/28/2018 Yes    Chronic systolic heart failure/ ICMO EF 40% [I50.22] 10/28/2018 Yes    Essential hypertension [I10] 10/28/2018 Yes      Problems Resolved During this Admission:       VTE Risk Mitigation (From admission, onward)        Ordered     apixaban tablet 5 mg  2 times daily      12/28/18 1533          MICHAEL Sanchez  [facility]    I, Dr. Shaquille Patel, have personally seen and examined the patient with my advanced provider and agree with above. I personally did a focused exam, and reviewed all necessary clinical information. I discussed my management plan with my NP and agree with above. Family updated at bedside.

## 2019-01-02 NOTE — SUBJECTIVE & OBJECTIVE
Principal Problem:Hip dislocation, left, initial encounter    Principal Orthopedic Problem: S/P L KAYLEE    Interval History: multiple    Review of patient's allergies indicates:  No Known Allergies    Current Facility-Administered Medications   Medication    apixaban tablet 5 mg    aspirin chewable tablet 81 mg    atorvastatin tablet 40 mg    carvedilol tablet 3.125 mg    donepezil tablet 5 mg    famotidine tablet 20 mg    furosemide injection 20 mg    HYDROcodone-acetaminophen 5-325 mg per tablet 1 tablet    megestrol 400 mg/10 mL (10 mL) suspension 200 mg    memantine tablet 10 mg    ondansetron injection 4 mg    polyethylene glycol packet 17 g    potassium chloride SA CR tablet 10 mEq    senna tablet 8.6 mg    sodium chloride 0.9% flush 5 mL     Objective:     Vital Signs (Most Recent):  Temp: 98.7 °F (37.1 °C) (01/02/19 0323)  Pulse: 69 (01/02/19 0801)  Resp: 20 (01/02/19 0801)  BP: (!) 145/66 (01/02/19 0323)  SpO2: 96 % (01/02/19 0801) Vital Signs (24h Range):  Temp:  [98 °F (36.7 °C)-99 °F (37.2 °C)] 98.7 °F (37.1 °C)  Pulse:  [69-81] 69  Resp:  [16-20] 20  SpO2:  [94 %-98 %] 96 %  BP: (113-173)/(55-82) 145/66     Weight: 94.3 kg (207 lb 14.3 oz)  Height: 6' (182.9 cm)(office 12/14)  Body mass index is 28.2 kg/m².      Intake/Output Summary (Last 24 hours) at 1/2/2019 0820  Last data filed at 1/2/2019 0600  Gross per 24 hour   Intake 200 ml   Output --   Net 200 ml       General    Nursing note and vitals reviewed.  Constitutional: He appears well-developed and well-nourished.   Pulmonary/Chest: Effort normal.   Abdominal: Soft.   Neurological: He is alert.   Psychiatric: He has a normal mood and affect. His behavior is normal.         Left Hip Exam     Comments:  Dressings C/D/I. LLE DNVI            Significant Labs:   CBC:   Recent Labs   Lab 01/01/19  0531 01/02/19  0546   WBC 6.60 5.90   HGB 8.5* 8.9*   HCT 27.0* 27.1*    339     CMP:   Recent Labs   Lab 01/01/19  0531 01/02/19  0546     140   K 3.8 3.5    108   CO2 23 24   GLU 85 83   BUN 18 15   CREATININE 0.9 0.9   CALCIUM 8.2* 8.2*   PROT 5.4* 5.4*   ALBUMIN 2.2* 2.1*   BILITOT 0.4 0.4   ALKPHOS 88 86   AST 17 17   ALT 15 11   ANIONGAP 8 8   EGFRNONAA >60 >60     All pertinent labs within the past 24 hours have been reviewed.    Significant Imaging: X-Ray: I have reviewed all pertinent results/findings and my personal findings are:  L hip X-rays from the other day are negative for acute injury

## 2019-01-02 NOTE — NURSING
Discharge instructions discussed with pt and daughter, verbalized understanding. IV removed, stasis achieved, pt transported via w/c to personal vehicle, no complications.

## 2019-01-02 NOTE — PLAN OF CARE
Spoke with the pt's daughter, Madeleine at the bedside regarding the discharge plan; the pt has progressed with physical therapy enough that she feels comfortable taking the pt home with home health.....SUMIT Ly       01/02/19 7067   Post-Acute Status   Post-Acute Authorization Placement   Post-Acute Placement Status Patient/Family Changed Mind

## 2019-01-02 NOTE — PROGRESS NOTES
[facility]  Orthopedics  Progress Note    Patient Name: Adrian Osorio  MRN: [id number]  Admission Date: 12/21/2018  Hospital Length of Stay: 12 days  Attending Provider: Pascale Nash MD  Primary Care Provider: Martín Perales MD  Follow-up For: Procedure(s) (LRB):  REVISION, TOTAL ARTHROPLASTY, HIP (Left)    Post-Operative Day: 9 Days Post-Op  Subjective:     Principal Problem:Hip dislocation, left, initial encounter    Principal Orthopedic Problem: S/P L KAYLEE    Interval History: multiple    Review of patient's allergies indicates:  No Known Allergies    Current Facility-Administered Medications   Medication    apixaban tablet 5 mg    aspirin chewable tablet 81 mg    atorvastatin tablet 40 mg    carvedilol tablet 3.125 mg    donepezil tablet 5 mg    famotidine tablet 20 mg    furosemide injection 20 mg    HYDROcodone-acetaminophen 5-325 mg per tablet 1 tablet    megestrol 400 mg/10 mL (10 mL) suspension 200 mg    memantine tablet 10 mg    ondansetron injection 4 mg    polyethylene glycol packet 17 g    potassium chloride SA CR tablet 10 mEq    senna tablet 8.6 mg    sodium chloride 0.9% flush 5 mL     Objective:     Vital Signs (Most Recent):  Temp: 98.7 °F (37.1 °C) (01/02/19 0323)  Pulse: 69 (01/02/19 0801)  Resp: 20 (01/02/19 0801)  BP: (!) 145/66 (01/02/19 0323)  SpO2: 96 % (01/02/19 0801) Vital Signs (24h Range):  Temp:  [98 °F (36.7 °C)-99 °F (37.2 °C)] 98.7 °F (37.1 °C)  Pulse:  [69-81] 69  Resp:  [16-20] 20  SpO2:  [94 %-98 %] 96 %  BP: (113-173)/(55-82) 145/66     Weight: 94.3 kg (207 lb 14.3 oz)  Height: 6' (182.9 cm)(office 12/14)  Body mass index is 28.2 kg/m².      Intake/Output Summary (Last 24 hours) at 1/2/2019 0820  Last data filed at 1/2/2019 0600  Gross per 24 hour   Intake 200 ml   Output --   Net 200 ml       General    Nursing note and vitals reviewed.  Constitutional: He appears well-developed and well-nourished.   Pulmonary/Chest: Effort  normal.   Abdominal: Soft.   Neurological: He is alert.   Psychiatric: He has a normal mood and affect. His behavior is normal.         Left Hip Exam     Comments:  Dressings C/D/I. LLE DNVI            Significant Labs:   CBC:   Recent Labs   Lab 01/01/19  0531 01/02/19  0546   WBC 6.60 5.90   HGB 8.5* 8.9*   HCT 27.0* 27.1*    339     CMP:   Recent Labs   Lab 01/01/19  0531 01/02/19  0546    140   K 3.8 3.5    108   CO2 23 24   GLU 85 83   BUN 18 15   CREATININE 0.9 0.9   CALCIUM 8.2* 8.2*   PROT 5.4* 5.4*   ALBUMIN 2.2* 2.1*   BILITOT 0.4 0.4   ALKPHOS 88 86   AST 17 17   ALT 15 11   ANIONGAP 8 8   EGFRNONAA >60 >60     All pertinent labs within the past 24 hours have been reviewed.    Significant Imaging: X-Ray: I have reviewed all pertinent results/findings and my personal findings are:  L hip X-rays from the other day are negative for acute injury    Assessment/Plan:     * Hip dislocation, left, initial encounter    Stable from a postoperative perspective.  Awaiting SNF transfer. However, the patient's functional mobility continues to improve and he may be a candidate to be discharged home with home health. I would like for the physical therapist to address this.           MERCEDES GUEVARA  Orthopedics  [facility]

## 2019-01-02 NOTE — PLAN OF CARE
Home health referral sent to [facility] via [facility] system, awaiting acceptance    1417-Per [place], patient accepted

## 2019-01-02 NOTE — PLAN OF CARE
Problem: Adult Inpatient Plan of Care  Goal: Plan of Care Review  Outcome: Ongoing (interventions implemented as appropriate)  Plan of care reviewed with patient & daughter. SR on telemetry. L. Hip dressing clean, dry & intact. L. Hip immobilizer on . Medicated for pain once during night, full relief obtained. Remains free from falls/injury. Instructed to call for assistance as needed during night. Daughter & patient verbalized understanding. Call light in reach. Avasys & bed alarm in use. Bed in low & locked position.

## 2019-01-02 NOTE — DISCHARGE SUMMARY
Discharge Summary  Hospital Medicine    Admit Date: 12/21/2018    Date and Time: 1/2/20191:32 PM    Discharge Attending Physician: Pascale Nash MD    Primary Care Physician: Martín Perales MD    Diagnoses:  Active Hospital Problems    Diagnosis  POA    *Hip dislocation, left, initial encounter [S73.005A] s/p left hip arthroplasty revision    Yes    Chronic ischemic right MCA stroke [I69.30]  Not Applicable    Moderate malnutrition [E44.0]  Yes    Alzheimer's dementia without behavioral disturbance [G30.9, F02.80]  Yes    Chronic systolic heart failure/ ICMO EF 40% [I50.22]  Yes    Essential hypertension [I10]  PAF  Moderate malnutrition  Acute surgical blood loss anemia  Altered mental Status  Hypokalemia  Yes     Discharged Condition: Good    Hospital Course:   Mr. Osorio is an 84yo M with a PMH of Dementia, HTN, CAD/CABG 2008, CHF, ICMO with EF 35%. He presented to the ED with c/o left hip pain. Patient had a left hip arthroplasty secondary to a femoral neck fracture in October 2018. He was evaluated on 12/17/2018 and yesterday for left hip dislocation. The dislocation was reduced in the ED and he was placed in a abduction brace. The plan was to have a repair for instability done after [place].  He returned with again L hip dislocation.  He is admitted to the service of hospital medicine. Pt is a poor historian; information is obtained from ED and past medical records. Patient was admitted to Hospitalist medicine service. Patient was evaluated by Dr. Neves who performed left hip revision arthroplasty. Dr. Raza evaluated the patient and did not find any hip infection. Post-op hospital course was complicated with PAF for which patient was started on Eliquis. Bleeding risks and fall precautions discussed with patient. Patient was evaluated by neurologist for  Evaluation of unresponsive episode. No new acute CVA noted instead old right MCA territory CVA findings noted. Patient was noted to be  decondition. While patient was being evaluated for SNF, patient's deconditioning is improving with PT. Patient and family requested to go home instead. Home PT is arranged. Patient to continue close follow up with his doctors. Patient was discharged home in stable condition with following discharge plan of care. Total time with the patient was 30 minutes and greater than 50% was spent in counseling and coordination of care. The assessment and plan have been discussed at length. Physicians' notes reviewed. Labs and procedure reviewed.     Consults: Dr. Neves, Dr. GWEN Hammond, Dr. Jennings, Dr. Raza    Significant Diagnostic Studies:   Left hip x-ray: Left hip dislocation     Left hip x-ray: Interval reduction of the superolateral dislocation.     ECHO:  · Moderate left ventricular enlargement.  · Decreased left ventricular systolic function. The estimated ejection fraction is 18%  · Global hypokinetic wall motion.  · Grade I (mild) left ventricular diastolic dysfunction consistent with impaired relaxation.  · Normal left atrial pressure.  · Severe left atrial enlargement.  · Mild right atrial enlargement.  · Mild aortic regurgitation.  · Mild-to-moderate mitral regurgitation.  · Indeterminate central venous pressure. Estimated PA pressure is at least 21.16 mmHg.     CTA head and neck:  Although there is atherosclerotic plaque at the origin of the left carotid artery and both subclavian arteries and at the carotid bifurcations bilaterally significant stenosis is not demonstrated.  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  The right vertebral artery does not join the left vertebral artery to form the basilar artery which is supplied from the left vertebral artery. On this study an abrupt cut off of a trifurcation vessel on either side or  peripheral AVM or mass effect or aneurysm at the [id number] is not seen..  A small old right middle cerebral artery distribution infarct is noted in the white matter of the right parietal lobe and a small lacunar infarct is seen in the left internal capsule.     MRI brain: There is no acute abnormality.  There is generalized volume loss with marked nonspecific white matter change.  Additionally, there are chronic infarctions in the right cerebellum, right centrum semiovale, medial left basal ganglia.  There is no acute hemorrhage or mass effect.  There is no acute infarction.  Please see above discussion.     Repeat CT head: There is no obvious acute abnormality.  There is volume loss and nonspecific white matter change.  Regions of low density in the right centrum semiovale and posterior limb of the left internal capsule likely reflect sequelae of remote infarctions.  There is no hemorrhage, mass effect or obvious acute edema or ischemia.  It should be noted that MRI is more sensitive in the detection of subtle or acute nonhemorrhagic ischemic disease.    Microbiology Results (last 7 days)     Procedure Component Value Units Date/Time    Culture, Anaerobic [[id number]] Collected:  12/24/18 1235    Order Status:  Completed Specimen:  Body Fluid from Hip, Left Updated:  12/31/18 0713     Anaerobic Culture No anaerobes isolated    Aerobic culture [[id number]] Collected:  12/24/18 1235    Order Status:  Completed Specimen:  Body Fluid from Hip, Left Updated:  12/27/18 0933     Aerobic Bacterial Culture No growth        Special Treatments/Procedures: as above  Disposition: Home or Self Care    Medications:  Reconciled Home Medications:   Current Discharge Medication List      START taking these medications    Details   apixaban (ELIQUIS) 5 mg Tab Take 1 tablet (5 mg total) by mouth 2 (two) times daily.  Qty: 60 tablet, Refills: 0      aspirin 81 MG Chew Take 1 tablet (81 mg total) by mouth once daily.  Refills: 0       atorvastatin (LIPITOR) 40 MG tablet Take 1 tablet (40 mg total) by mouth once daily.  Qty: 30 tablet, Refills: 0      furosemide (LASIX) 20 MG tablet Take 1 tablet (20 mg total) by mouth once daily.  Qty: 30 tablet, Refills: 0      HYDROcodone-acetaminophen (NORCO) 5-325 mg per tablet Take 1 tablet by mouth every 4 (four) hours as needed.  Qty: 20 tablet, Refills: 0      polyethylene glycol (GLYCOLAX) 17 gram PwPk Take 17 g by mouth 2 (two) times daily as needed.  Refills: 0         CONTINUE these medications which have NOT CHANGED    Details   amLODIPine (NORVASC) 5 MG tablet Take 5 mg by mouth 2 (two) times daily.      carvedilol (COREG) 6.25 MG tablet Take 6.25 mg by mouth 2 (two) times daily with meals.        donepezil (ARICEPT) 5 MG tablet Take 5 mg by mouth every evening.      ergocalciferol (ERGOCALCIFEROL) 50,000 unit Cap Take 50,000 Units by mouth every 14 (fourteen) days.      memantine (NAMENDA) 10 MG tablet Take 10 mg by mouth 2 (two) times daily.        potassium chloride SA (K-DUR,KLOR-CON) 20 MEQ tablet Take 20 mEq by mouth once daily.       QUEtiapine (SEROQUEL) 25 MG Tab Take 25 mg by mouth 2 (two) times daily. Take 1-2 tablets by mouth 2 times daily      tamsulosin (FLOMAX) 0.4 mg Cap Take 0.4 mg by mouth once daily.      valsartan (DIOVAN) 160 MG tablet Take 160 mg by mouth once daily.         STOP taking these medications       aspirin 325 MG tablet Comments:   Reason for Stopping:             Discharge Procedure Orders   Ambulatory referral to Home Health   Referral Priority: Routine Referral Type: Home Health   Referral Reason: Specialty Services Required   Requested Specialty: Home Health Services   Number of Visits Requested: 1     Referral to Home health   Referral Priority: Routine Referral Type: Home Health   Referral Reason: Specialty Services Required   Requested Specialty: Home Health Services   Number of Visits Requested: 1     Diet Cardiac   Order Comments: Patient to monitor daily  weight, follow low salt diet and in case if gain more than 3 pounds in 24 hours, please call your doctor for further advice.     Other restrictions (specify):   Order Comments: Left hip brace use as directed by Dr. Neves. Use hip precautions.  PLEASE OBSERVE FALL PRECAUTIONS     Call MD for:   Order Comments: For worsening symptoms, chest pain, shortness of breath, increased abdominal pain, high grade fever, stroke or stroke like symptoms, immediately go to the nearest Emergency Room or call 911 as soon as possible.     Follow-up Information     Tyrone Patel MD In 2 weeks.    Specialties:  Vascular Neurology, Neurology  Contact information:  [address]  [address]  [place] [address]  [phone]             [facility].    Specialty:  Home Health Services  Why:  Home Health  Contact information:  [address]  [place] [address]  [phone]             Martín Perales MD.    Specialty:  Internal Medicine  Contact information:  [address]  [place] [address]  [phone]             Brando Neves MD In 1 week.    Specialties:  Orthopedic Surgery, Surgery, Sports Medicine  Contact information:  [address]  [address]  [place] [address]  [phone]             Mel Hammond MD In 1 week.    Specialty:  Cardiology  Contact information:  [address]  [address]  [place] [address]  [phone]

## 2019-01-02 NOTE — PT/OT/SLP PROGRESS
Physical Therapy Treatment    Patient Name:  Adrian Osorio   MRN:  [id number]    Recommendations:     Discharge Recommendations:  nursing facility, skilled   Discharge Equipment Recommendations:     Barriers to discharge: None    Assessment:     Adrian Osorio is a 85 y.o. male admitted with a medical diagnosis of Hip dislocation, left, initial encounter.  He presents with the following impairments/functional limitations:  weakness, impaired self care skills, impaired functional mobilty, gait instability, decreased lower extremity function, decreased safety awareness, pain .    Rehab Prognosis: Good; patient would benefit from acute skilled PT services to address these deficits and reach maximum level of function.    Recent Surgery: Procedure(s) (LRB):  REVISION, TOTAL ARTHROPLASTY, HIP (Left) 9 Days Post-Op    Plan:     During this hospitalization, patient to be seen BID to address the identified rehab impairments via gait training, therapeutic activities, therapeutic exercises and progress toward the following goals:    · Plan of Care Expires:       Subjective     Chief Complaint: being in hospital  Patient/Family Comments/goals: to return home  Pain/Comfort:  · Pain Rating 1: other (see comments)(did not rate)  · Location - Side 1: Left  · Location - Orientation 1: generalized  · Location 1: hip  · Pain Addressed 1: Reposition, Nurse notified      Objective:     Communicated with nurse Paredes prior to session.  Patient found seated in chair with telemetry, hip abduction brace  upon PT entry to room.     General Precautions: Standard, hearing impaired, fall   Orthopedic Precautions:LLE weight bearing as tolerated, LLE posterior precautions   Braces: Hip abduction brace     Functional Mobility:  · Transfers:     · Sit to Stand:  maximal assistance with rolling walker  · Gait: 50' with rw and MIn A ,WBAT LLE with Hip abd brace in place.      AM-PAC 6 CLICK MOBILITY          Therapeutic Activities and  Exercises:   Sat up in chair following with chair alarm and BLE;s elevated.    Patient left up in chair with all lines intact, call button in reach, nurse Lena notified and daughter present..    GOALS:   Multidisciplinary Problems     Physical Therapy Goals        Problem: Physical Therapy Goal    Goal Priority Disciplines Outcome Goal Variances Interventions   Physical Therapy Goal     PT, PT/OT Ongoing (interventions implemented as appropriate)     Description:  Goals to be met by: 2019     Patient will increase functional independence with mobility by performin). Supine to sit with Stand-by Assistance  2). Sit to supine with Stand-by Assistance  3). Sit to stand transfer with Contact Guard Assistance  4). Bed to chair transfer with Contact Guard Assistance using Rolling Walker  5). Gait  x  > 50 feet with Contact Guard Assistance using Rolling Walker.                        Time Tracking:     PT Received On: 19  PT Start Time: 1055     PT Stop Time: 1105  PT Total Time (min): 10 min     Billable Minutes: Gait Training 10min    Treatment Type: Treatment  PT/PTA: PTA     PTA Visit Number: 1     Tammi Gonzalez PTA  2019

## 2019-01-03 NOTE — PLAN OF CARE
01/03/19 0802   Final Note   Assessment Type Final Discharge Note   Anticipated Discharge Disposition Home-Health

## 2019-01-08 ENCOUNTER — TELEPHONE (OUTPATIENT)
Dept: ORTHOPEDICS | Facility: CLINIC | Age: 84
End: 2019-01-08

## 2019-01-08 DIAGNOSIS — Z96.642 PRESENCE OF LEFT ARTIFICIAL HIP JOINT: Primary | ICD-10-CM

## 2019-01-08 NOTE — TELEPHONE ENCOUNTER
Spoke with daughter, made post op jennifer't, also informed her that he will need an xray before his visit and [facility] will call her.

## 2019-01-10 ENCOUNTER — HOSPITAL ENCOUNTER (OUTPATIENT)
Dept: RADIOLOGY | Facility: HOSPITAL | Age: 84
Discharge: HOME | End: 2019-01-10
Attending: ORTHOPAEDIC SURGERY
Payer: MEDICARE

## 2019-01-10 ENCOUNTER — OFFICE VISIT (OUTPATIENT)
Dept: ORTHOPEDICS | Facility: CLINIC | Age: 84
End: 2019-01-10
Payer: MEDICARE

## 2019-01-10 VITALS
BODY MASS INDEX: 27.09 KG/M2 | HEIGHT: 72 IN | HEART RATE: 72 BPM | DIASTOLIC BLOOD PRESSURE: 60 MMHG | WEIGHT: 200 LBS | SYSTOLIC BLOOD PRESSURE: 118 MMHG

## 2019-01-10 DIAGNOSIS — Z96.642 PRESENCE OF LEFT ARTIFICIAL HIP JOINT: Primary | ICD-10-CM

## 2019-01-10 DIAGNOSIS — F02.818 LATE ONSET ALZHEIMER'S DISEASE WITH BEHAVIORAL DISTURBANCE: ICD-10-CM

## 2019-01-10 DIAGNOSIS — G30.1 LATE ONSET ALZHEIMER'S DISEASE WITH BEHAVIORAL DISTURBANCE: ICD-10-CM

## 2019-01-10 DIAGNOSIS — Z96.642 PRESENCE OF LEFT ARTIFICIAL HIP JOINT: ICD-10-CM

## 2019-01-10 PROCEDURE — 73502 XR HIP 2 VIEW LEFT: ICD-10-PCS | Mod: 26,LT,, | Performed by: RADIOLOGY

## 2019-01-10 PROCEDURE — 99024 POSTOP FOLLOW-UP VISIT: CPT | Mod: ,,, | Performed by: ORTHOPAEDIC SURGERY

## 2019-01-10 PROCEDURE — 73502 X-RAY EXAM HIP UNI 2-3 VIEWS: CPT | Mod: TC,FY,LT

## 2019-01-10 PROCEDURE — 99024 PR POST-OP FOLLOW-UP VISIT: ICD-10-PCS | Mod: ,,, | Performed by: ORTHOPAEDIC SURGERY

## 2019-01-10 PROCEDURE — 73502 X-RAY EXAM HIP UNI 2-3 VIEWS: CPT | Mod: 26,LT,, | Performed by: RADIOLOGY

## 2019-01-10 RX ORDER — HYDROCODONE BITARTRATE AND ACETAMINOPHEN 5; 325 MG/1; MG/1
1 TABLET ORAL EVERY 4 HOURS PRN
Qty: 20 TABLET | Refills: 0 | Status: SHIPPED | OUTPATIENT
Start: 2019-01-10

## 2019-01-10 NOTE — PROGRESS NOTES
formerly [facility] ORTHOPEDICS POST-OP NOTE    Subjective:           Chief Complaint:   Chief Complaint   Patient presents with    Left Hip - Post-op Evaluation     sutures out: L-KAYLEE on 12/24/18. He has had 4 falls since his surgery. He did get x ray at [facility] today       Past Medical History:   Diagnosis Date    CHF (congestive heart failure)     Coronary artery disease     Hypertension        Past Surgical History:   Procedure Laterality Date    CARDIAC SURGERY      cabg    HEMIARTHROPLASTY, HIP Left 10/29/2018    Performed by [facility] at [facility] OR    REVISION, TOTAL ARTHROPLASTY, HIP Left 12/24/2018    Performed by Brando Neves MD at [facility] OR       Current Outpatient Medications   Medication Sig    amLODIPine (NORVASC) 5 MG tablet Take 5 mg by mouth 2 (two) times daily.    apixaban (ELIQUIS) 5 mg Tab Take 1 tablet (5 mg total) by mouth 2 (two) times daily.    aspirin 81 MG Chew Take 1 tablet (81 mg total) by mouth once daily.    atorvastatin (LIPITOR) 40 MG tablet Take 1 tablet (40 mg total) by mouth once daily.    carvedilol (COREG) 6.25 MG tablet Take 6.25 mg by mouth 2 (two) times daily with meals.      donepezil (ARICEPT) 5 MG tablet Take 5 mg by mouth every evening.    ergocalciferol (ERGOCALCIFEROL) 50,000 unit Cap Take 50,000 Units by mouth every 14 (fourteen) days.    furosemide (LASIX) 20 MG tablet Take 1 tablet (20 mg total) by mouth once daily.    HYDROcodone-acetaminophen (NORCO) 5-325 mg per tablet Take 1 tablet by mouth every 4 (four) hours as needed.    memantine (NAMENDA) 10 MG tablet Take 10 mg by mouth 2 (two) times daily.      polyethylene glycol (GLYCOLAX) 17 gram PwPk Take 17 g by mouth 2 (two) times daily as needed.    potassium chloride SA (K-DUR,KLOR-CON) 20 MEQ tablet Take 20 mEq by mouth once daily.     QUEtiapine (SEROQUEL) 25 MG Tab Take 25 mg by mouth 2 (two) times daily. Take 1-2 tablets by mouth 2 times daily    tamsulosin (FLOMAX) 0.4 mg Cap Take 0.4 mg by  mouth once daily.    valsartan (DIOVAN) 160 MG tablet Take 160 mg by mouth once daily.     No current facility-administered medications for this visit.        Review of patient's allergies indicates:  No Known Allergies    Family History   Problem Relation Age of Onset    Heart disease Mother     Heart disease Father     Heart attack Sister     Hypertension Brother     Stroke Brother        Social History     Socioeconomic History    Marital status:      Spouse name: Not on file    Number of children: Not on file    Years of education: Not on file    Highest education level: Not on file   Social Needs    Financial resource strain: Not on file    Food insecurity - worry: Not on file    Food insecurity - inability: Not on file    Transportation needs - medical: Not on file    Transportation needs - non-medical: Not on file   Occupational History    Not on file   Tobacco Use    Smoking status: Never Smoker    Smokeless tobacco: Never Used   Substance and Sexual Activity    Alcohol use: No    Drug use: No    Sexual activity: Not Currently   Other Topics Concern    Not on file   Social History Narrative    Not on file       History of present illness: Patient comes back today. He has had multiple falls since undergoing a total hip specifically a constrained implants. He falls out of bed at night. He is demented.      Review of Systems:    Musculoskeletal:  See HPI      Objective:        Physical Examination:    Vital Signs:    Vitals:    01/10/19 1555   BP: 118/60   Pulse: 72       Body mass index is 27.12 kg/m².    This a well-developed, well nourished patient in no acute distress.  They are alert and oriented and cooperative to examination.        Wounds are clean dry and intact. Leg lengths are symmetric  Pertinent New Results:    XRAY Report / Interpretation:   AP and lateral of the left hip is reviewed. It demonstrates his total hip arthroplasty well reduced.    Assessment/Plan:       Stable following constrained cup placement. We have requested a hospital bed. They will follow up in 3 months    This note was created using Dragon voice recognition software that occasionally misinterpreted phrases or words.

## 2019-01-29 ENCOUNTER — TELEPHONE (OUTPATIENT)
Dept: ORTHOPEDICS | Facility: CLINIC | Age: 84
End: 2019-01-29

## 2019-01-29 NOTE — TELEPHONE ENCOUNTER
Madeleine pts daughter called and they are checking on the status of a hospital bed # [phone]

## 2019-01-29 NOTE — TELEPHONE ENCOUNTER
Spoke with Madeleine, hospital bed was ordered on 1/10/19. She put Arelis on the phone who happened to be at the home and she stated they did not receive the order. I will fax it again [phone]

## 2019-01-30 ENCOUNTER — TELEPHONE (OUTPATIENT)
Dept: ORTHOPEDICS | Facility: CLINIC | Age: 84
End: 2019-01-30

## 2019-01-30 NOTE — TELEPHONE ENCOUNTER
Called-LM that the order states hospital bed but I did re-fax it, also faxed to [facility]

## 2019-01-30 NOTE — TELEPHONE ENCOUNTER
Madeleine pts daughter called [facility] called and told them that they did receive the order but it was only for the rails not the whole bed please fax order again and it must state needs the whole bed pts# [phone]

## 2023-08-07 NOTE — TRANSFER OF CARE
Anesthesia Transfer of Care Note    Patient: Adrian Osorio    Procedure(s) Performed: Procedure(s) (LRB):  REVISION, TOTAL ARTHROPLASTY, HIP (Left)    Patient location: PACU    Anesthesia Type: general    Transport from OR: Transported from OR on 2-3 L/min O2 by NC with adequate spontaneous ventilation    Post pain: adequate analgesia    Post assessment: no apparent anesthetic complications and tolerated procedure well    Post vital signs: stable    Level of consciousness: sedated and confused    Nausea/Vomiting: no nausea/vomiting    Complications: none    Transfer of care protocol was followed      Last vitals:   Visit Vitals  BP (!) 162/75 (BP Location: Left arm, Patient Position: Lying)   Pulse 71   Temp 37 °C (98.6 °F) (Skin)   Resp 18   Ht 6' (1.829 m)   Wt 94.3 kg (208 lb)   SpO2 95%   BMI 28.21 kg/m²      Agree with above Agree with above. Agree with above. Agree with above. Agree with/edited as appropriate above  Q2hr neurochecks  if no drips requiring for BP parameter and exam unchanged, d/w neurosurgery re: step down I reviewed available diagnostic studies, and reviewed images personally. I agree with resident's history, exam, orders placed, and plan of care. Medical issues needing to be addressed include:  cerebral isnfarct, R hemipareiss, aphasia, R M2 stenosis/occlusion, AMS, hx of CVA on plavix, sz on keppra, HLD, HTN. Exam improved. cEEG unremarkable. Cont Keppra. 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 Per daughter pt appears to be baseline, except for some slowing with her speech and occasional difficulty with words. W/ drop in BP to 120s, aphasia worsens - BP augmentation in ICU. Cont home plavix, will need loop on DC. Agree with above. Agree with above. Agree with above. Agree with plan. Agree with above. Agree with above. Agree with above.

## (undated) DEVICE — SUT 2-0 VICRYL / CT-1

## (undated) DEVICE — SEE MEDLINE ITEM 152530

## (undated) DEVICE — CLOSURE SKIN STERI STRIP 1/2X4

## (undated) DEVICE — DRAPE STERI INSTRUMENT 1018

## (undated) DEVICE — SLEEVE SCD EXPRESS CALF MEDIUM

## (undated) DEVICE — Device

## (undated) DEVICE — TUBE SUCTION YANKAUER HI CAP

## (undated) DEVICE — SEE MEDLINE ITEM 157117

## (undated) DEVICE — SEE MEDLINE ITEM 146420

## (undated) DEVICE — SOL 9P NACL IRR PIC IL

## (undated) DEVICE — ELECTRODE REM PLYHSV RETURN 9

## (undated) DEVICE — TAPE SILK 3IN

## (undated) DEVICE — PILLOW ABDUCTION FOAM MED

## (undated) DEVICE — GAUZE SPONGE BULKEE 6X6.75IN

## (undated) DEVICE — PAD ABD 8X10 STERILE

## (undated) DEVICE — DRAIN SINGLE ROUND 3/16 15F

## (undated) DEVICE — SOL NACL IRR 3000ML

## (undated) DEVICE — SUT CTD VICRYL 1 UND BR

## (undated) DEVICE — SUT MONOCRYL 3-0 PS-1

## (undated) DEVICE — DRESSING TRANS 6X8 TEGADERM

## (undated) DEVICE — SWAB CULTURETTE SINGLE

## (undated) DEVICE — PADDING CAST SPECIALIST 6X4YD

## (undated) DEVICE — SOL IRR NACL .9% 3000ML

## (undated) DEVICE — UNDERGLOVES BIOGEL PI SIZE 8.5

## (undated) DEVICE — INTERPULSE SET

## (undated) DEVICE — DRAPE HIP TIBURON 87X115X134

## (undated) DEVICE — COLLECTOR SPECIMEN ANAEROBIC

## (undated) DEVICE — MANIFOLD 4 PORT

## (undated) DEVICE — ADHESIVE MASTISOL VIAL 48/BX

## (undated) DEVICE — DRAPE STERI U-SHAPED 47X51IN

## (undated) DEVICE — BLADE SAW SGTL NARROW 0.89

## (undated) DEVICE — EVACUATOR WOUND BULB 100CC

## (undated) DEVICE — DRAPE PLASTIC U 60X72

## (undated) DEVICE — ALCOHOL 70% ISOP RUBBING 4OZ

## (undated) DEVICE — PACK CUSTOM UNIV BASIN SLI

## (undated) DEVICE — SEE MEDLINE ITEM 146292

## (undated) DEVICE — APPLICATOR CHLORAPREP ORN 26ML

## (undated) DEVICE — TAPE SURGICAL MICROPORE 3IN

## (undated) DEVICE — SEALER AQUAMANTYS 2.3 BIPOLAR

## (undated) DEVICE — SEE MEDLINE ITEM 153151

## (undated) DEVICE — DRESSING N ADH OIL EMUL 3X8 3S

## (undated) DEVICE — SEE MEDLINE ITEM 146271

## (undated) DEVICE — CLOSURE STERI STRIP .5X4IN TAN

## (undated) DEVICE — SEE MEDLINE ITEM 157216

## (undated) DEVICE — SEE MEDLINE ITEM 152622

## (undated) DEVICE — SEE MEDLINE ITEM 146313

## (undated) DEVICE — SEE MEDLINE ITEM 157131

## (undated) DEVICE — DRAPE C ARM 42 X 120 10/BX

## (undated) DEVICE — SPACESUIT TOGA T5 ZIPPER PEEL

## (undated) DEVICE — BLADE SURG CARBON STEEL #10

## (undated) DEVICE — GLOVE SURG ULTRA TOUCH 8.5

## (undated) DEVICE — GLOVE BIOGEL SZ 8 1/2

## (undated) DEVICE — DRESSING DERMACEA SPNG 10S

## (undated) DEVICE — DRAPE INCISE IOBAN 2 23X33IN

## (undated) DEVICE — DRAPE STERI-DRAPE 83X125 IOBAN

## (undated) DEVICE — SUT CTD VICRYL CT-1 27

## (undated) DEVICE — DRAPE INCISE IOBAN 2 23X17IN

## (undated) DEVICE — SEE MEDLINE ITEM 157166

## (undated) DEVICE — SUT STRATAFIX SPIRAL VIOLET

## (undated) DEVICE — BLADE ELECTRO EXTENDED.

## (undated) DEVICE — SUT FIBERWIRE 2 38 IN TAPER

## (undated) DEVICE — PACK BASIC

## (undated) DEVICE — TUBE SUCTION I CRVD 3/8ID 10